# Patient Record
Sex: MALE | Race: BLACK OR AFRICAN AMERICAN | NOT HISPANIC OR LATINO | URBAN - METROPOLITAN AREA
[De-identification: names, ages, dates, MRNs, and addresses within clinical notes are randomized per-mention and may not be internally consistent; named-entity substitution may affect disease eponyms.]

---

## 2021-02-27 ENCOUNTER — INPATIENT (INPATIENT)
Facility: HOSPITAL | Age: 66
LOS: 36 days | Discharge: HOME | DRG: 871 | End: 2021-04-05
Attending: INTERNAL MEDICINE | Admitting: INTERNAL MEDICINE
Payer: OTHER GOVERNMENT

## 2021-02-27 VITALS
RESPIRATION RATE: 36 BRPM | HEART RATE: 88 BPM | OXYGEN SATURATION: 85 % | WEIGHT: 309.97 LBS | DIASTOLIC BLOOD PRESSURE: 92 MMHG | SYSTOLIC BLOOD PRESSURE: 112 MMHG | TEMPERATURE: 98 F | HEIGHT: 71 IN

## 2021-02-27 DIAGNOSIS — I48.91 UNSPECIFIED ATRIAL FIBRILLATION: ICD-10-CM

## 2021-02-27 LAB
ALBUMIN SERPL ELPH-MCNC: 3.7 G/DL — SIGNIFICANT CHANGE UP (ref 3.3–5)
ALP SERPL-CCNC: 43 U/L — SIGNIFICANT CHANGE UP (ref 40–120)
ALT FLD-CCNC: 76 U/L — HIGH (ref 10–45)
ANION GAP SERPL CALC-SCNC: 18 MMOL/L — HIGH (ref 5–17)
AST SERPL-CCNC: 394 U/L — HIGH (ref 10–40)
BASE EXCESS BLDV CALC-SCNC: 3.6 MMOL/L — HIGH (ref -2–2)
BASOPHILS # BLD AUTO: 0 K/UL — SIGNIFICANT CHANGE UP (ref 0–0.2)
BASOPHILS NFR BLD AUTO: 0 % — SIGNIFICANT CHANGE UP (ref 0–2)
BILIRUB SERPL-MCNC: 0.9 MG/DL — SIGNIFICANT CHANGE UP (ref 0.2–1.2)
BUN SERPL-MCNC: 26 MG/DL — HIGH (ref 7–23)
CA-I SERPL-SCNC: 1 MMOL/L — LOW (ref 1.12–1.3)
CALCIUM SERPL-MCNC: 8.5 MG/DL — SIGNIFICANT CHANGE UP (ref 8.4–10.5)
CHLORIDE BLDV-SCNC: 92 MMOL/L — LOW (ref 96–108)
CHLORIDE SERPL-SCNC: 90 MMOL/L — LOW (ref 96–108)
CO2 BLDV-SCNC: 29 MMOL/L — SIGNIFICANT CHANGE UP (ref 22–30)
CO2 SERPL-SCNC: 23 MMOL/L — SIGNIFICANT CHANGE UP (ref 22–31)
CREAT SERPL-MCNC: 1.67 MG/DL — HIGH (ref 0.5–1.3)
CRP SERPL-MCNC: 10.95 MG/DL — HIGH (ref 0–0.4)
D DIMER BLD IA.RAPID-MCNC: 343 NG/ML DDU — HIGH
EOSINOPHIL # BLD AUTO: 0 K/UL — SIGNIFICANT CHANGE UP (ref 0–0.5)
EOSINOPHIL NFR BLD AUTO: 0 % — SIGNIFICANT CHANGE UP (ref 0–6)
FERRITIN SERPL-MCNC: 1636 NG/ML — HIGH (ref 30–400)
GAS PNL BLDV: 127 MMOL/L — LOW (ref 135–145)
GAS PNL BLDV: SIGNIFICANT CHANGE UP
GAS PNL BLDV: SIGNIFICANT CHANGE UP
GLUCOSE BLDV-MCNC: 115 MG/DL — HIGH (ref 70–99)
GLUCOSE SERPL-MCNC: 121 MG/DL — HIGH (ref 70–99)
HCO3 BLDV-SCNC: 28 MMOL/L — SIGNIFICANT CHANGE UP (ref 21–29)
HCT VFR BLD CALC: 41.1 % — SIGNIFICANT CHANGE UP (ref 39–50)
HCT VFR BLDA CALC: 45 % — SIGNIFICANT CHANGE UP (ref 39–50)
HGB BLD CALC-MCNC: 14.8 G/DL — SIGNIFICANT CHANGE UP (ref 13–17)
HGB BLD-MCNC: 14 G/DL — SIGNIFICANT CHANGE UP (ref 13–17)
LACTATE BLDV-MCNC: 3.2 MMOL/L — HIGH (ref 0.7–2)
LYMPHOCYTES # BLD AUTO: 0.51 K/UL — LOW (ref 1–3.3)
LYMPHOCYTES # BLD AUTO: 10.4 % — LOW (ref 13–44)
MANUAL SMEAR VERIFICATION: SIGNIFICANT CHANGE UP
MCHC RBC-ENTMCNC: 29.4 PG — SIGNIFICANT CHANGE UP (ref 27–34)
MCHC RBC-ENTMCNC: 34.1 GM/DL — SIGNIFICANT CHANGE UP (ref 32–36)
MCV RBC AUTO: 86.2 FL — SIGNIFICANT CHANGE UP (ref 80–100)
MONOCYTES # BLD AUTO: 0.3 K/UL — SIGNIFICANT CHANGE UP (ref 0–0.9)
MONOCYTES NFR BLD AUTO: 6.1 % — SIGNIFICANT CHANGE UP (ref 2–14)
NEUTROPHILS # BLD AUTO: 4.12 K/UL — SIGNIFICANT CHANGE UP (ref 1.8–7.4)
NEUTROPHILS NFR BLD AUTO: 83.5 % — HIGH (ref 43–77)
OTHER CELLS CSF MANUAL: 9 ML/DL — LOW (ref 18–22)
PCO2 BLDV: 40 MMHG — SIGNIFICANT CHANGE UP (ref 35–50)
PH BLDV: 7.45 — SIGNIFICANT CHANGE UP (ref 7.35–7.45)
PLAT MORPH BLD: NORMAL — SIGNIFICANT CHANGE UP
PLATELET # BLD AUTO: 164 K/UL — SIGNIFICANT CHANGE UP (ref 150–400)
PO2 BLDV: 27 MMHG — SIGNIFICANT CHANGE UP (ref 25–45)
POTASSIUM BLDV-SCNC: 2.8 MMOL/L — CRITICAL LOW (ref 3.5–5.3)
POTASSIUM SERPL-MCNC: 2.9 MMOL/L — CRITICAL LOW (ref 3.5–5.3)
POTASSIUM SERPL-SCNC: 2.9 MMOL/L — CRITICAL LOW (ref 3.5–5.3)
PROCALCITONIN SERPL-MCNC: 0.58 NG/ML — HIGH (ref 0.02–0.1)
PROT SERPL-MCNC: 7.1 G/DL — SIGNIFICANT CHANGE UP (ref 6–8.3)
RBC # BLD: 4.77 M/UL — SIGNIFICANT CHANGE UP (ref 4.2–5.8)
RBC # FLD: 13.1 % — SIGNIFICANT CHANGE UP (ref 10.3–14.5)
RBC BLD AUTO: SIGNIFICANT CHANGE UP
SAO2 % BLDV: 44 % — LOW (ref 67–88)
SARS-COV-2 RNA SPEC QL NAA+PROBE: DETECTED
SARS-COV-2 RNA SPEC QL NAA+PROBE: DETECTED
SODIUM SERPL-SCNC: 131 MMOL/L — LOW (ref 135–145)
TROPONIN T, HIGH SENSITIVITY RESULT: 18 NG/L — SIGNIFICANT CHANGE UP (ref 0–51)
TROPONIN T, HIGH SENSITIVITY RESULT: 21 NG/L — SIGNIFICANT CHANGE UP (ref 0–51)
WBC # BLD: 4.93 K/UL — SIGNIFICANT CHANGE UP (ref 3.8–10.5)
WBC # FLD AUTO: 4.93 K/UL — SIGNIFICANT CHANGE UP (ref 3.8–10.5)

## 2021-02-27 PROCEDURE — 71045 X-RAY EXAM CHEST 1 VIEW: CPT | Mod: 26

## 2021-02-27 PROCEDURE — 71275 CT ANGIOGRAPHY CHEST: CPT | Mod: 26

## 2021-02-27 PROCEDURE — 93010 ELECTROCARDIOGRAM REPORT: CPT

## 2021-02-27 PROCEDURE — 99285 EMERGENCY DEPT VISIT HI MDM: CPT

## 2021-02-27 RX ORDER — DILTIAZEM HCL 120 MG
60 CAPSULE, EXT RELEASE 24 HR ORAL ONCE
Refills: 0 | Status: COMPLETED | OUTPATIENT
Start: 2021-02-27 | End: 2021-02-27

## 2021-02-27 RX ORDER — METOPROLOL TARTRATE 50 MG
5 TABLET ORAL ONCE
Refills: 0 | Status: DISCONTINUED | OUTPATIENT
Start: 2021-02-27 | End: 2021-02-27

## 2021-02-27 RX ORDER — DEXAMETHASONE 0.5 MG/5ML
6 ELIXIR ORAL DAILY
Refills: 0 | Status: DISCONTINUED | OUTPATIENT
Start: 2021-02-27 | End: 2021-03-08

## 2021-02-27 RX ORDER — ALBUTEROL 90 UG/1
2 AEROSOL, METERED ORAL ONCE
Refills: 0 | Status: COMPLETED | OUTPATIENT
Start: 2021-02-27 | End: 2021-02-27

## 2021-02-27 RX ORDER — REMDESIVIR 5 MG/ML
100 INJECTION INTRAVENOUS EVERY 24 HOURS
Refills: 0 | Status: COMPLETED | OUTPATIENT
Start: 2021-03-01 | End: 2021-03-04

## 2021-02-27 RX ORDER — DILTIAZEM HCL 120 MG
35 CAPSULE, EXT RELEASE 24 HR ORAL ONCE
Refills: 0 | Status: DISCONTINUED | OUTPATIENT
Start: 2021-02-27 | End: 2021-02-27

## 2021-02-27 RX ORDER — DILTIAZEM HCL 120 MG
30 CAPSULE, EXT RELEASE 24 HR ORAL ONCE
Refills: 0 | Status: COMPLETED | OUTPATIENT
Start: 2021-02-27 | End: 2021-02-27

## 2021-02-27 RX ORDER — POTASSIUM CHLORIDE 20 MEQ
40 PACKET (EA) ORAL ONCE
Refills: 0 | Status: COMPLETED | OUTPATIENT
Start: 2021-02-27 | End: 2021-02-27

## 2021-02-27 RX ORDER — ENOXAPARIN SODIUM 100 MG/ML
140 INJECTION SUBCUTANEOUS EVERY 12 HOURS
Refills: 0 | Status: DISCONTINUED | OUTPATIENT
Start: 2021-02-27 | End: 2021-03-22

## 2021-02-27 RX ORDER — POTASSIUM CHLORIDE 20 MEQ
10 PACKET (EA) ORAL
Refills: 0 | Status: COMPLETED | OUTPATIENT
Start: 2021-02-27 | End: 2021-02-27

## 2021-02-27 RX ORDER — DILTIAZEM HCL 120 MG
30 CAPSULE, EXT RELEASE 24 HR ORAL THREE TIMES A DAY
Refills: 0 | Status: DISCONTINUED | OUTPATIENT
Start: 2021-02-27 | End: 2021-03-10

## 2021-02-27 RX ORDER — REMDESIVIR 5 MG/ML
200 INJECTION INTRAVENOUS EVERY 24 HOURS
Refills: 0 | Status: COMPLETED | OUTPATIENT
Start: 2021-02-28 | End: 2021-02-28

## 2021-02-27 RX ORDER — REMDESIVIR 5 MG/ML
INJECTION INTRAVENOUS
Refills: 0 | Status: COMPLETED | OUTPATIENT
Start: 2021-02-28 | End: 2021-03-04

## 2021-02-27 RX ORDER — DEXAMETHASONE 0.5 MG/5ML
6 ELIXIR ORAL ONCE
Refills: 0 | Status: COMPLETED | OUTPATIENT
Start: 2021-02-27 | End: 2021-02-27

## 2021-02-27 RX ADMIN — Medication 100 MILLIEQUIVALENT(S): at 17:17

## 2021-02-27 RX ADMIN — Medication 40 MILLIEQUIVALENT(S): at 14:50

## 2021-02-27 RX ADMIN — Medication 30 MILLIGRAM(S): at 13:26

## 2021-02-27 RX ADMIN — ALBUTEROL 2 PUFF(S): 90 AEROSOL, METERED ORAL at 13:26

## 2021-02-27 RX ADMIN — Medication 100 MILLIEQUIVALENT(S): at 18:31

## 2021-02-27 RX ADMIN — Medication 60 MILLIGRAM(S): at 13:50

## 2021-02-27 RX ADMIN — Medication 6 MILLIGRAM(S): at 13:25

## 2021-02-27 RX ADMIN — Medication 100 MILLIEQUIVALENT(S): at 14:50

## 2021-02-27 NOTE — H&P ADULT - PROBLEM SELECTOR PLAN 1
start Remdesivir, Dexamethasone, full dose AC w sc Lovenox, unable to R/O PE. check LE dopplers, bedside TTE, cont 100%NRB. pulm and ID called, trend inflammatory markers

## 2021-02-27 NOTE — ED PROVIDER NOTE - CLINICAL SUMMARY MEDICAL DECISION MAKING FREE TEXT BOX
Yumiko Alberts, PGY-1: 64YO male hx of htn, recent dx covid 5d prior, p/w hypoxia, tachycardia, afib. not hypotensive, no CP. pt has no hx of afib and not on anticoagulation. plan for diltiazem push, covid workup, admission.

## 2021-02-27 NOTE — H&P ADULT - HISTORY OF PRESENT ILLNESS
The patient is a 65y Male, hx of morbid obesity, HTN on lisinopril, complaining of shortness of breath. covid + dx 5d prior. no home O2 requirement, former smoker. has dyspnea at rest and on exertion. has not taken steroids, no hospitalization for covid. denies CP. [facility]

## 2021-02-27 NOTE — ED PROVIDER NOTE - NS ED ROS FT
Gen: Denies fevers  CV: Denies chest pain  Skin: denies color changes  Resp: + SOB, cough  Endo: Denies increased urination  GI: Denies nausea, vomiting  Msk: Denies extremity pain  : Denies dysuria  Neuro: Denies LOC  Psych: Denies mood changes

## 2021-02-27 NOTE — ED PROVIDER NOTE - OBJECTIVE STATEMENT
The patient is a 65y Male, hx of HTN on lisinopril, complaining of shortness of breath. covid + dx 5d prior. no home O2 requirement, former smoker. has dyspnea at rest and on exertion. has not taken steroids, no hospitalization for covid. denies CP. [facility].

## 2021-02-27 NOTE — H&P ADULT - NSHPPHYSICALEXAM_GEN_ALL_CORE
T(F): 98.2 (02-27-21 @ 17:08), Max: 98.6 (02-27-21 @ 16:20)  HR: 103 (02-27-21 @ 17:08) (88 - 178)  BP: 104/71 (02-27-21 @ 17:08) (104/65 - 130/85)  RR: 25 (02-27-21 @ 17:08) (25 - 36)  SpO2: 94% (02-27-21 @ 17:08) (85% - 95%)      PHYSICAL EXAM:  GENERAL: NAD, well-developed  HEAD:  Atraumatic, Normocephalic  EYES: EOMI, PERRLA, conjunctiva and sclera clear  NECK: Supple, No JVD  CHEST/LUNG: Clear to auscultation bilaterally; No wheeze  HEART: Regular rate and rhythm; No murmurs, rubs, or gallops  ABDOMEN: Soft, Nontender, Nondistended; Bowel sounds present  EXTREMITIES:  2+ Peripheral Pulses, No clubbing, cyanosis, or edema  PSYCH: AAOx3  NEUROLOGY: non-focal  SKIN: No rashes or

## 2021-02-27 NOTE — ED ADULT NURSE NOTE - NS ED NURSE REPORT GIVEN TO FT
Report given to on coming nurse Keely humphries. Understands pmh, medications given and plan of care for patient. Patient in stable condition, vital signs updated, has no complaints at this time and has been updated on care plan. Explained to patient that it is change of shift and new nurse is taking over, pt verbalized understanding.

## 2021-02-27 NOTE — ED PROVIDER NOTE - PROGRESS NOTE DETAILS
Yumiko Alberts, PGY-1: pt presented hypoxic, dyspnea with speaking, stable BP, not hypotensive. , Afib with RVR, given diltiazem 30mg. pt responded with improved HR and more regular intervals. Attending MD Ham: Case discussed with Dr. Carmichael, will admit to her service on Tele

## 2021-02-27 NOTE — ED PROVIDER NOTE - PHYSICAL EXAMINATION
Gen: WDWN, NAD  HEENT: EOMI, no nasal discharge, mucous membranes moist  CV: tacycardic, irregular rhythm, +S1/S2, no M/R/G  Resp: crackles at b/l lung bases  GI: Abdomen soft non-distended, NTTP, no masses  MSK: No open wounds, no bruising, no LE edema  Neuro: A&Ox4, following commands, moving all four extremities spontaneously  Psych: appropriate mood

## 2021-02-27 NOTE — H&P ADULT - PROBLEM SELECTOR PLAN 4
start cardizem po. BP on the low side, trend. may need to add digoxin if remains tachy. on full AC w sc Lovenox

## 2021-02-27 NOTE — ED PROVIDER NOTE - ATTENDING CONTRIBUTION TO CARE
Attending MD Ham: I personally have seen and examined this patient.  Resident note reviewed and agree on plan of care and except where noted.  See below for details.     Seen in [facility] 34 then moved to [facility]   PMD/Cards at [facility]    65M with PMH/PSH including HTN on Lisinopril, morbid obesity, former tobacco presents to the ED with shortness of breath in setting of +COVID (diagnosed 2/22/21).  Limited history given marked dyspnea.  Reports no home O2.  Reports dyspnea at rest, worse with exertion, worsening over last week.  Reports nonproductive cough.  Denies recent steroids.  Denies chest pain.  Denies abdominal pain, nausea, vomiting, diarrhea.  Denies urinary complaints.  Denies history of AFib or anticoag.  Limited ROS secondary to dyspnea.      Exam:   General: +respiratory distress, speaking in one word answers, sitting up in bed  HENT: head NCAT, airway patent  Eyes: no conjunctival injection, anicteric  Lungs: lungs with decreased breath sounds at bilateral bases, tachypneic, sat'ing high 80's/low 90s despite NC, switched to NRB with improvement to low to low-mid 90s, no wheezing, no rhonchi, scattered rales  Cardiac: +S1S2, markedly tachycardic, irregular, no m/r/g  GI: abdomen soft with +BS, NT, exam limited secondary to body habitus  MSK: FROM at neck, no calf tenderness, swelling, erythema or warmth  Neuro: moving all extremities spontaneously, sensory grossly intact, no gross neuro deficits  Skin: warm, dry    A/P: 65M with shortness of breath in setting of known COVID and tachycardia, acute hypoxic resp failure from possible multiple factors including known COVID, will obtain COVID labs, CXR, will give decadron 6mg, ?possibly hypercoagulable from same, patient unable to lay flat for CTA at this time and not stable enough for CT scan at this time, ?new AFib w RVR on EKG, heart rate and breathing improved significantly with IV Cardizem 30mg, will give oral, keep on cardiac monitor, given patient improvement after Cardizem, will keep on NRB, if worsens, will consider high flow or bipap and an ICU consult, will need admission

## 2021-02-27 NOTE — ED ADULT NURSE NOTE - OBJECTIVE STATEMENT
66 y/o male presents to ed c/o sob. States he was diagnosed with covid this past Monday and since has had increased sob and cough. Denies chest pain, ha, n/v/d, abdominal pain, f/c, urinary symptoms, hematuria. A&Ox4, hypoxic, placed on NRB, skin warm dry and intact, MAEx4, lungs CTA, abd soft obese and nontender.  Patient's bed in the lowest position, explained plan of care to patient and family members. Will continue to reassess.

## 2021-02-28 DIAGNOSIS — I48.91 UNSPECIFIED ATRIAL FIBRILLATION: ICD-10-CM

## 2021-02-28 DIAGNOSIS — I10 ESSENTIAL (PRIMARY) HYPERTENSION: ICD-10-CM

## 2021-02-28 DIAGNOSIS — U07.1 COVID-19: ICD-10-CM

## 2021-02-28 LAB
ALBUMIN SERPL ELPH-MCNC: 3.2 G/DL — LOW (ref 3.3–5)
ALP SERPL-CCNC: 43 U/L — SIGNIFICANT CHANGE UP (ref 40–120)
ALT FLD-CCNC: 76 U/L — HIGH (ref 10–45)
ANION GAP SERPL CALC-SCNC: 14 MMOL/L — SIGNIFICANT CHANGE UP (ref 5–17)
AST SERPL-CCNC: 326 U/L — HIGH (ref 10–40)
BILIRUB DIRECT SERPL-MCNC: 0.2 MG/DL — SIGNIFICANT CHANGE UP (ref 0–0.2)
BILIRUB INDIRECT FLD-MCNC: 0.6 MG/DL — SIGNIFICANT CHANGE UP (ref 0.2–1)
BILIRUB SERPL-MCNC: 0.8 MG/DL — SIGNIFICANT CHANGE UP (ref 0.2–1.2)
BUN SERPL-MCNC: 26 MG/DL — HIGH (ref 7–23)
CALCIUM SERPL-MCNC: 8.5 MG/DL — SIGNIFICANT CHANGE UP (ref 8.4–10.5)
CHLORIDE SERPL-SCNC: 92 MMOL/L — LOW (ref 96–108)
CO2 SERPL-SCNC: 25 MMOL/L — SIGNIFICANT CHANGE UP (ref 22–31)
CREAT SERPL-MCNC: 1.27 MG/DL — SIGNIFICANT CHANGE UP (ref 0.5–1.3)
GLUCOSE SERPL-MCNC: 130 MG/DL — HIGH (ref 70–99)
HCT VFR BLD CALC: 41.9 % — SIGNIFICANT CHANGE UP (ref 39–50)
HCV AB S/CO SERPL IA: 0.17 S/CO — SIGNIFICANT CHANGE UP (ref 0–0.99)
HCV AB SERPL-IMP: SIGNIFICANT CHANGE UP
HGB BLD-MCNC: 14.1 G/DL — SIGNIFICANT CHANGE UP (ref 13–17)
INR BLD: 1.05 RATIO — SIGNIFICANT CHANGE UP (ref 0.88–1.16)
MCHC RBC-ENTMCNC: 29 PG — SIGNIFICANT CHANGE UP (ref 27–34)
MCHC RBC-ENTMCNC: 33.7 GM/DL — SIGNIFICANT CHANGE UP (ref 32–36)
MCV RBC AUTO: 86.2 FL — SIGNIFICANT CHANGE UP (ref 80–100)
NRBC # BLD: 0 /100 WBCS — SIGNIFICANT CHANGE UP (ref 0–0)
NT-PROBNP SERPL-SCNC: 277 PG/ML — SIGNIFICANT CHANGE UP (ref 0–300)
PLATELET # BLD AUTO: 176 K/UL — SIGNIFICANT CHANGE UP (ref 150–400)
POTASSIUM SERPL-MCNC: 3.8 MMOL/L — SIGNIFICANT CHANGE UP (ref 3.5–5.3)
POTASSIUM SERPL-SCNC: 3.8 MMOL/L — SIGNIFICANT CHANGE UP (ref 3.5–5.3)
PROT SERPL-MCNC: 7.3 G/DL — SIGNIFICANT CHANGE UP (ref 6–8.3)
PROTHROM AB SERPL-ACNC: 12.6 SEC — SIGNIFICANT CHANGE UP (ref 10.6–13.6)
RBC # BLD: 4.86 M/UL — SIGNIFICANT CHANGE UP (ref 4.2–5.8)
RBC # FLD: 13.2 % — SIGNIFICANT CHANGE UP (ref 10.3–14.5)
SARS-COV-2 IGG SERPL QL IA: NEGATIVE — SIGNIFICANT CHANGE UP
SARS-COV-2 IGM SERPL IA-ACNC: 0.29 INDEX — SIGNIFICANT CHANGE UP
SODIUM SERPL-SCNC: 131 MMOL/L — LOW (ref 135–145)
WBC # BLD: 6.79 K/UL — SIGNIFICANT CHANGE UP (ref 3.8–10.5)
WBC # FLD AUTO: 6.79 K/UL — SIGNIFICANT CHANGE UP (ref 3.8–10.5)

## 2021-02-28 RX ORDER — DIGOXIN 250 MCG
0.25 TABLET ORAL DAILY
Refills: 0 | Status: DISCONTINUED | OUTPATIENT
Start: 2021-03-01 | End: 2021-03-02

## 2021-02-28 RX ORDER — ALBUTEROL 90 UG/1
2.5 AEROSOL, METERED ORAL ONCE
Refills: 0 | Status: DISCONTINUED | OUTPATIENT
Start: 2021-02-28 | End: 2021-02-28

## 2021-02-28 RX ORDER — DIGOXIN 250 MCG
0.25 TABLET ORAL ONCE
Refills: 0 | Status: COMPLETED | OUTPATIENT
Start: 2021-02-28 | End: 2021-02-28

## 2021-02-28 RX ORDER — ALBUTEROL 90 UG/1
2 AEROSOL, METERED ORAL ONCE
Refills: 0 | Status: COMPLETED | OUTPATIENT
Start: 2021-02-28 | End: 2021-02-28

## 2021-02-28 RX ADMIN — ENOXAPARIN SODIUM 140 MILLIGRAM(S): 100 INJECTION SUBCUTANEOUS at 00:38

## 2021-02-28 RX ADMIN — ENOXAPARIN SODIUM 140 MILLIGRAM(S): 100 INJECTION SUBCUTANEOUS at 06:07

## 2021-02-28 RX ADMIN — ENOXAPARIN SODIUM 140 MILLIGRAM(S): 100 INJECTION SUBCUTANEOUS at 17:12

## 2021-02-28 RX ADMIN — Medication 6 MILLIGRAM(S): at 06:07

## 2021-02-28 RX ADMIN — Medication 30 MILLIGRAM(S): at 06:07

## 2021-02-28 RX ADMIN — REMDESIVIR 500 MILLIGRAM(S): 5 INJECTION INTRAVENOUS at 10:31

## 2021-02-28 RX ADMIN — Medication 0.25 MILLIGRAM(S): at 17:11

## 2021-02-28 RX ADMIN — Medication 30 MILLIGRAM(S): at 15:03

## 2021-02-28 NOTE — PROGRESS NOTE ADULT - SUBJECTIVE AND OBJECTIVE BOX
Patient is a 65y old  Male who presents with a chief complaint of acute hypoxemic respiratory failure (28 Feb 2021 13:35)      SUBJECTIVE / OVERNIGHT EVENTS: remains on NRB    MEDICATIONS  (STANDING):  ALBUTerol    90 MICROgram(s) HFA Inhaler 2 Puff(s) Inhalation once  dexAMETHasone  Injectable 6 milliGRAM(s) IV Push daily  diltiazem    Tablet 30 milliGRAM(s) Oral three times a day  enoxaparin Injectable 140 milliGRAM(s) SubCutaneous every 12 hours  remdesivir  IVPB   IV Intermittent     MEDICATIONS  (PRN):      Vital Signs Last 24 Hrs  T(F): 98.5 (02-28-21 @ 21:18), Max: 98.6 (02-28-21 @ 11:53)  HR: 101 (02-28-21 @ 21:18) (69 - 101)  BP: 127/89 (02-28-21 @ 21:18) (121/83 - 134/88)  RR: 21 (02-28-21 @ 21:18) (20 - 21)  SpO2: 88% (02-28-21 @ 21:18) (88% - 95%)  Telemetry:   CAPILLARY BLOOD GLUCOSE        I&O's Summary    27 Feb 2021 07:01  -  28 Feb 2021 07:00  --------------------------------------------------------  IN: 400 mL / OUT: 525 mL / NET: -125 mL    28 Feb 2021 07:01  -  28 Feb 2021 23:37  --------------------------------------------------------  IN: 450 mL / OUT: 500 mL / NET: -50 mL        PHYSICAL EXAM:  GENERAL: NAD, well-developed  HEAD:  Atraumatic, Normocephalic  EYES: EOMI, PERRLA, conjunctiva and sclera clear  NECK: Supple, No JVD  CHEST/LUNG: Clear to auscultation bilaterally; No wheeze  HEART: Regular rate and rhythm; No murmurs, rubs, or gallops  ABDOMEN: Soft, Nontender, Nondistended; Bowel sounds present  EXTREMITIES:  2+ Peripheral Pulses, No clubbing, cyanosis, or edema  PSYCH: AAOx3  NEUROLOGY: non-focal  SKIN: No rashes or lesions    LABS:                        14.1   6.79  )-----------( 176      ( 28 Feb 2021 06:20 )             41.9     02-28    131<L>  |  92<L>  |  26<H>  ----------------------------<  130<H>  3.8   |  25  |  1.27    Ca    8.5      28 Feb 2021 06:27    TPro  7.3  /  Alb  3.2<L>  /  TBili  0.8  /  DBili  0.2  /  AST  326<H>  /  ALT  76<H>  /  AlkPhos  43  02-28    PT/INR - ( 28 Feb 2021 06:17 )   PT: 12.6 sec;   INR: 1.05 ratio                   RADIOLOGY & ADDITIONAL TESTS:    Imaging Personally Reviewed:    Consultant(s) Notes Reviewed:      Care Discussed with Consultants/Other Providers:

## 2021-02-28 NOTE — CONSULT NOTE ADULT - SUBJECTIVE AND OBJECTIVE BOX
CHIEF COMPLAINT:    HISTORY OF PRESENT ILLNESS:   65y Male, hx of morbid obesity, HTN on lisinopril, complaining of shortness of breath. covid + dx 5d prior. no home O2 requirement, former smoker. has dyspnea at rest and on exertion. has not taken steroids, no hospitalization for covid. denies CP. [facility]  Converted to Afib RVR last night   + history of Afib. no previous stroke   no cp or palpitations   + SOB and cough       PAST MEDICAL & SURGICAL HISTORY:  HTN (hypertension)            MEDICATIONS:  diltiazem    Tablet 30 milliGRAM(s) Oral three times a day  enoxaparin Injectable 140 milliGRAM(s) SubCutaneous every 12 hours    remdesivir  IVPB   IV Intermittent           dexAMETHasone  Injectable 6 milliGRAM(s) IV Push daily        FAMILY HISTORY:      SOCIAL HISTORY:    [ ] Non-smoker  [ ] Smoker  [ ] Alcohol    Allergies    Actifed (Headache)  Sudafed (Headache)    Intolerances    	    REVIEW OF SYSTEMS:  CONSTITUTIONAL: No fever, weight loss, o+fatigue  EYES: No eye pain, visual disturbances, or discharge  ENMT:  No difficulty hearing, tinnitus, vertigo; No sinus or throat pain  NECK: No pain or stiffness  RESPIRATORY: No cough, wheezing, chills or hemoptysis; + Shortness of Breath  CARDIOVASCULAR: No chest pain, palpitations, passing out, dizziness, or leg swelling  GASTROINTESTINAL: No abdominal or epigastric pain. No nausea, vomiting, or hematemesis; No diarrhea or constipation. No melena or hematochezia.  GENITOURINARY: No dysuria, frequency, hematuria, or incontinence  NEUROLOGICAL: No headaches, memory loss, loss of strength, numbness, or tremors  SKIN: No itching, burning, rashes, or lesions   LYMPH Nodes: No enlarged glands  ENDOCRINE: No heat or cold intolerance; No hair loss  MUSCULOSKELETAL: No joint pain or swelling; No muscle, back, or extremity pain  PSYCHIATRIC: No depression, anxiety, mood swings, or difficulty sleeping  HEME/LYMPH: No easy bruising, or bleeding gums  ALLERY AND IMMUNOLOGIC: No hives or eczema	    [ ] All others negative	  [ ] Unable to obtain    PHYSICAL EXAM:  T(C): 36.5 (02-28-21 @ 04:44), Max: 37 (02-27-21 @ 16:20)  HR: 84 (02-28-21 @ 04:44) (84 - 178)  BP: 123/89 (02-28-21 @ 04:44) (104/65 - 130/85)  RR: 20 (02-28-21 @ 04:44) (20 - 36)  SpO2: 93% (02-28-21 @ 04:44) (85% - 95%)  Wt(kg): --  I&O's Summary    27 Feb 2021 07:01  -  28 Feb 2021 07:00  --------------------------------------------------------  IN: 400 mL / OUT: 525 mL / NET: -125 mL        Appearance: NAD obese on NRB    HEENT:   Normal oral mucosa, PERRL, EOMI	  Lymphatic: No lymphadenopathy  Cardiovascular: Irregular  S1 S2, No JVD, No murmurs, No edema  Respiratory: Decreased bs  Psychiatry: A & O x 3, Mood & affect appropriate  Gastrointestinal:  Soft, Non-tender, + BS	  Skin: No rashes, No ecchymoses, No cyanosis	  Neurologic: Non-focal  Extremities: Normal range of motion, No clubbing, cyanosis or edema  Vascular: Peripheral pulses palpable 2+ bilaterally    TELEMETRY: SR, Afib RVR 	    ECG:  	Afib RVR. nonspecific stt changes   RADIOLOGY:  < from: Xray Chest 1 View- PORTABLE-Urgent (Xray Chest 1 View- PORTABLE-Urgent .) (02.27.21 @ 15:07) >  EXAM:  XR CHEST PORTABLE URGENT 1V                            PROCEDURE DATE:  02/27/2021            INTERPRETATION:  CLINICAL INDICATION: Shortness of Breath    TECHNIQUE: Single frontal, portable view of the chest was obtained.    COMPARISON: Chest Radiograph dated 2/22/2012    FINDINGS:    Evaluation of the lung parenchyma is limited secondary to suboptimal inspiration patient body habitus. Scattered diffuse airspace opacities throughout the bilateral lungs, most prominent in the bilateral lower lobes is present. Small left pleural effusion.    IMPRESSION:  Evaluation of the lung parenchyma is limited secondary to suboptimal inspiration patient body habitus. Scattered diffuse airspace opacities throughout the bilateral lungs, most prominent in the bilateral lower lobes is present. Small left pleural effusion.      < end of copied text >  < from: CT Angio Chest w/ IV Cont (02.27.21 @ 23:35) >    EXAM:  CT ANGIO CHEST (W)AW IC                            PROCEDURE DATE:  02/27/2021            INTERPRETATION:  CLINICAL INFORMATION: Hypoxia, Covid 19 infection, assess for pulmonary embolism    COMPARISON: Chest radiograph from same day    PROCEDURE:  CT Angiography of the Chest.  90 ml of Omnipaque 350 was injected intravenously. 10 ml were discarded.  Sagittal and coronal reformats were performed as well as 3D (MIP) reconstructions.    FINDINGS:    LUNGS AND AIRWAYS: Patent central airways.  Bilateral patchy groundglass and consolidative opacities with peripheral and lower lobe predominance.  PLEURA: No pleural effusion.  MEDIASTINUM AND KASIE: No lymphadenopathy.  VESSELS: No central, main, lobar, or segmental pulmonary embolism. Evaluation of the subsegmental pulmonary arteries is limited/nondiagnostic secondary to respiratory motion.  HEART: Heart size is normal. No pericardial effusion.  CHEST WALL AND LOWER NECK: Prominent right upper paratracheal lymph node may be reactive  VISUALIZED UPPER ABDOMEN: Small hiatal hernia.  BONES: Spinal degenerative changes.    IMPRESSION:  No central, main, lobar, or segmental pulmonary embolism. Evaluation of the subsegmental pulmonary arteries is limited/nondiagnostic secondary to respiratory motion.    Bilateral patchy groundglass and consolidative opacities with peripheral and lower lobe predominance, compatible with patient's known Covid 19 infection. Follow-up to resolution recommended.                VLAD TRACEY MD; Resident Interventional Radiology  This document has been electronically signed.  MARTI CANTU MD; Attending Radiologist  This document has been electronically signed. Feb 28 2021 10:25AM    < end of copied text >    OTHER: 	  	  LABS:	 	    CARDIAC MARKERS:        COVID-19 PCR: Detected: You can help in the fight against COVID-19. [facility] may contact                           14.1   6.79  )-----------( 176      ( 28 Feb 2021 06:20 )             41.9     02-28    131<L>  |  92<L>  |  26<H>  ----------------------------<  130<H>  3.8   |  25  |  1.27    Ca    8.5      28 Feb 2021 06:27    TPro  7.3  /  Alb  3.2<L>  /  TBili  0.8  /  DBili  0.2  /  AST  326<H>  /  ALT  76<H>  /  AlkPhos  43  02-28    proBNP: Serum Pro-Brain Natriuretic Peptide: 277 pg/mL (02-28 @ 06:27)    Lipid Profile:   HgA1c:   TSH:

## 2021-02-28 NOTE — CONSULT NOTE ADULT - SUBJECTIVE AND OBJECTIVE BOX
PULMONARY CONSULT    Initial HPI on admission:  HPI:  The patient is a 65y Male, hx of morbid obesity, HTN on lisinopril, complaining of shortness of breath. covid + dx 5d prior. no home O2 requirement, former smoker. has dyspnea at rest and on exertion. has not taken steroids, no hospitalization for covid. denies CP. [facility]. (27 Feb 2021 22:29)      BRIEF HOSPITAL COURSE: Tolerating NRB at present.     PAST MEDICAL & SURGICAL HISTORY:  HTN (hypertension)      Allergies    Actifed (Headache)  Sudafed (Headache)    Intolerances      FAMILY HISTORY: no known lung cancer    Social history: nonsmoker    Review of Systems:  CONSTITUTIONAL: No fever, chills, or fatigue  EYES: No eye pain, visual disturbances, or discharge  ENMT:  No difficulty hearing, tinnitus, vertigo; No sinus or throat pain  NECK: No pain or stiffness  RESPIRATORY: Per above  CARDIOVASCULAR: No chest pain, palpitations, dizziness, or leg swelling  GASTROINTESTINAL: No abdominal or epigastric pain. No nausea, vomiting, or hematemesis; No diarrhea or constipation. No melena or hematochezia.  GENITOURINARY: No dysuria, frequency, hematuria, or incontinence  NEUROLOGICAL: No headaches, memory loss, loss of strength, numbness, or tremors  SKIN: No itching, burning, rashes, or lesions   MUSCULOSKELETAL: No joint pain or swelling; No muscle, back, or extremity pain  PSYCHIATRIC: No depression, anxiety, mood swings, or difficulty sleeping    Medications:  MEDICATIONS  (STANDING):  dexAMETHasone  Injectable 6 milliGRAM(s) IV Push daily  diltiazem    Tablet 30 milliGRAM(s) Oral three times a day  enoxaparin Injectable 140 milliGRAM(s) SubCutaneous every 12 hours  remdesivir  IVPB   IV Intermittent     MEDICATIONS  (PRN):    Vital Signs Last 24 Hrs  T(C): 37 (28 Feb 2021 11:53), Max: 37 (27 Feb 2021 16:20)  T(F): 98.6 (28 Feb 2021 11:53), Max: 98.6 (27 Feb 2021 16:20)  HR: 89 (28 Feb 2021 12:06) (79 - 147)  BP: 121/83 (28 Feb 2021 12:06) (104/71 - 125/91)  BP(mean): --  RR: 20 (28 Feb 2021 11:53) (20 - 30)  SpO2: 94% (28 Feb 2021 11:53) (93% - 95%)    VBG pH 7.45 02-27 @ 14:59  VBG pCO2 40 02-27 @ 14:59  VBG O2 sat 44 02-27 @ 14:59  VBG lactate 3.2 02-27 @ 14:59    02-27 @ 07:01  -  02-28 @ 07:00  --------------------------------------------------------  IN: 400 mL / OUT: 525 mL / NET: -125 mL    LABS:                        14.1   6.79  )-----------( 176      ( 28 Feb 2021 06:20 )             41.9     02-28    131<L>  |  92<L>  |  26<H>  ----------------------------<  130<H>  3.8   |  25  |  1.27    Ca    8.5      28 Feb 2021 06:27    TPro  7.3  /  Alb  3.2<L>  /  TBili  0.8  /  DBili  0.2  /  AST  326<H>  /  ALT  76<H>  /  AlkPhos  43  02-28    CAPILLARY BLOOD GLUCOSE        PT/INR - ( 28 Feb 2021 06:17 )   PT: 12.6 sec;   INR: 1.05 ratio      Procalcitonin, Serum: 0.58 ng/mL (02-27-21 @ 13:25)    Serum Pro-Brain Natriuretic Peptide: 277 pg/mL (02-28-21 @ 06:27)    CULTURES: (if applicable)    Physical Examination:    General: No acute distress.      HEENT: Pupils equal, reactive to light.  Symmetric.    PULM: Decreased BS at bases    CVS: S1, S2    ABD: Soft, nondistended, nontender, normoactive bowel sounds, no masses    EXT: No edema, nontender    SKIN: Warm and well perfused, no rashes noted.    NEURO: Alert, oriented, interactive, nonfocal    RADIOLOGY REVIEWED  CXR:    CT chest:    TTE:   done PULMONARY CONSULT    Initial HPI on admission:  HPI:  The patient is a 65y Male, hx of morbid obesity, HTN on lisinopril, complaining of shortness of breath. covid + dx 5d prior. no home O2 requirement, former smoker. has dyspnea at rest and on exertion. has not taken steroids, no hospitalization for covid. denies CP. [facility]. (27 Feb 2021 22:29)      BRIEF HOSPITAL COURSE: Tolerating NRB at present.     PAST MEDICAL & SURGICAL HISTORY:  HTN (hypertension)      Allergies    Actifed (Headache)  Sudafed (Headache)    Intolerances      FAMILY HISTORY: no known lung cancer    Social history: nonsmoker    Review of Systems:  CONSTITUTIONAL: No fever, chills, or fatigue  EYES: No eye pain, visual disturbances, or discharge  ENMT:  No difficulty hearing, tinnitus, vertigo; No sinus or throat pain  NECK: No pain or stiffness  RESPIRATORY: Per above  CARDIOVASCULAR: No chest pain, palpitations, dizziness, or leg swelling  GASTROINTESTINAL: No abdominal or epigastric pain. No nausea, vomiting, or hematemesis; No diarrhea or constipation. No melena or hematochezia.  GENITOURINARY: No dysuria, frequency, hematuria, or incontinence  NEUROLOGICAL: No headaches, memory loss, loss of strength, numbness, or tremors  SKIN: No itching, burning, rashes, or lesions   MUSCULOSKELETAL: No joint pain or swelling; No muscle, back, or extremity pain  PSYCHIATRIC: No depression, anxiety, mood swings, or difficulty sleeping    Medications:  MEDICATIONS  (STANDING):  dexAMETHasone  Injectable 6 milliGRAM(s) IV Push daily  diltiazem    Tablet 30 milliGRAM(s) Oral three times a day  enoxaparin Injectable 140 milliGRAM(s) SubCutaneous every 12 hours  remdesivir  IVPB   IV Intermittent     MEDICATIONS  (PRN):    Vital Signs Last 24 Hrs  T(C): 37 (28 Feb 2021 11:53), Max: 37 (27 Feb 2021 16:20)  T(F): 98.6 (28 Feb 2021 11:53), Max: 98.6 (27 Feb 2021 16:20)  HR: 89 (28 Feb 2021 12:06) (79 - 147)  BP: 121/83 (28 Feb 2021 12:06) (104/71 - 125/91)  BP(mean): --  RR: 20 (28 Feb 2021 11:53) (20 - 30)  SpO2: 94% (28 Feb 2021 11:53) (93% - 95%)    VBG pH 7.45 02-27 @ 14:59  VBG pCO2 40 02-27 @ 14:59  VBG O2 sat 44 02-27 @ 14:59  VBG lactate 3.2 02-27 @ 14:59    02-27 @ 07:01  -  02-28 @ 07:00  --------------------------------------------------------  IN: 400 mL / OUT: 525 mL / NET: -125 mL    LABS:                        14.1   6.79  )-----------( 176      ( 28 Feb 2021 06:20 )             41.9     02-28    131<L>  |  92<L>  |  26<H>  ----------------------------<  130<H>  3.8   |  25  |  1.27    Ca    8.5      28 Feb 2021 06:27    TPro  7.3  /  Alb  3.2<L>  /  TBili  0.8  /  DBili  0.2  /  AST  326<H>  /  ALT  76<H>  /  AlkPhos  43  02-28    CAPILLARY BLOOD GLUCOSE        PT/INR - ( 28 Feb 2021 06:17 )   PT: 12.6 sec;   INR: 1.05 ratio      Procalcitonin, Serum: 0.58 ng/mL (02-27-21 @ 13:25)    Serum Pro-Brain Natriuretic Peptide: 277 pg/mL (02-28-21 @ 06:27)    CULTURES: (if applicable)    Physical Examination:    General: No acute distress.      HEENT: Pupils equal, reactive to light.  Symmetric.    PULM: Decreased BS at bases    CVS: S1, S2    ABD: Soft, nondistended, nontender, normoactive bowel sounds, no masses    EXT: No edema, nontender    SKIN: Warm and well perfused, no rashes noted.    NEURO: Alert, oriented, interactive, nonfocal    RADIOLOGY REVIEWED  CXR:    CT chest: patchy GGO b/l, no large PE, smaller vessels nondiagnostic    TTE:

## 2021-02-28 NOTE — CONSULT NOTE ADULT - PROBLEM SELECTOR RECOMMENDATION 9
remdesivir  steroids  agree with empiric therapeutic lovenox remdesivir  steroids  on therapeutic lovenox for afib  no clear r/o of PE on smaller vessels  would keep therapeutic lovenox for now

## 2021-02-28 NOTE — CONSULT NOTE ADULT - SUBJECTIVE AND OBJECTIVE BOX
Patient is a 65y old  Male who presents with a chief complaint of acute hypoxemic respiratory failure (28 Feb 2021 11:04)      HPI:  The patient is a 65y Male, hx of morbid obesity, HTN on lisinopril, complaining of shortness of breath. covid + dx 5d prior. no home O2 requirement, former smoker. has dyspnea at rest and on exertion. has not taken steroids, no hospitalization for covid. denies CP. [facility]. (27 Feb 2021 22:29)      REVIEW OF SYSTEMS:    CONSTITUTIONAL: No fever, weight loss, or fatigue  EYES: No eye pain, visual disturbances, or discharge  ENMT:  No sore throat  NECK: No pain or stiffness  RESPIRATORY: No cough, wheezing, chills or hemoptysis; No shortness of breath  CARDIOVASCULAR: No chest pain, palpitations, dizziness, or leg swelling  GASTROINTESTINAL: No abdominal or epigastric pain. No nausea, vomiting, or hematemesis; No diarrhea or constipation. No melena or hematochezia.  GENITOURINARY: No dysuria, frequency, hematuria, or incontinence  NEUROLOGICAL: No headaches, memory loss, loss of strength, numbness, or tremors  SKIN: No itching, burning, rashes, or lesions   LYMPH NODES: No enlarged glands  MUSCULOSKELETAL: No joint pain or swelling; No muscle, back, or extremity pain      PAST MEDICAL & SURGICAL HISTORY:  HTN (hypertension)        Allergies    Actifed (Headache)  Sudafed (Headache)    Intolerances        FAMILY HISTORY:      SOCIAL HISTORY:        MEDICATIONS  (STANDING):  dexAMETHasone  Injectable 6 milliGRAM(s) IV Push daily  diltiazem    Tablet 30 milliGRAM(s) Oral three times a day  enoxaparin Injectable 140 milliGRAM(s) SubCutaneous every 12 hours  remdesivir  IVPB   IV Intermittent     MEDICATIONS  (PRN):      Vital Signs Last 24 Hrs  T(C): 36.5 (28 Feb 2021 04:44), Max: 37 (27 Feb 2021 16:20)  T(F): 97.7 (28 Feb 2021 04:44), Max: 98.6 (27 Feb 2021 16:20)  HR: 84 (28 Feb 2021 04:44) (84 - 178)  BP: 123/89 (28 Feb 2021 04:44) (104/65 - 130/85)  BP(mean): --  RR: 20 (28 Feb 2021 04:44) (20 - 36)  SpO2: 93% (28 Feb 2021 04:44) (85% - 95%)    PHYSICAL EXAM:    GENERAL: NAD, well-groomed  HEAD:  Atraumatic, Normocephalic  EYES: EOMI, PERRLA, conjunctiva and sclera clear  ENMT: No tonsillar erythema, exudates, or enlargement; Moist mucous membranes  NECK: Supple, No JVD  CHEST/LUNG: Clear to percussion bilaterally; No rales, rhonchi, wheezing, or rubs  HEART: Regular rate and rhythm; No murmurs, rubs, or gallops  ABDOMEN: Soft, Nontender, Nondistended; Bowel sounds present  EXTREMITIES:  2+ Peripheral Pulses, No clubbing, cyanosis, or edema  LYMPH: No lymphadenopathy noted  SKIN: No rashes or lesions    LABS:  CBC Full  -  ( 28 Feb 2021 06:20 )  WBC Count : 6.79 K/uL  RBC Count : 4.86 M/uL  Hemoglobin : 14.1 g/dL  Hematocrit : 41.9 %  Platelet Count - Automated : 176 K/uL  Mean Cell Volume : 86.2 fl  Mean Cell Hemoglobin : 29.0 pg  Mean Cell Hemoglobin Concentration : 33.7 gm/dL  Auto Neutrophil # : x  Auto Lymphocyte # : x  Auto Monocyte # : x  Auto Eosinophil # : x  Auto Basophil # : x  Auto Neutrophil % : x  Auto Lymphocyte % : x  Auto Monocyte % : x  Auto Eosinophil % : x  Auto Basophil % : x      02-28    131<L>  |  92<L>  |  26<H>  ----------------------------<  130<H>  3.8   |  25  |  1.27    Ca    8.5      28 Feb 2021 06:27    TPro  7.3  /  Alb  3.2<L>  /  TBili  0.8  /  DBili  0.2  /  AST  326<H>  /  ALT  76<H>  /  AlkPhos  43  02-28      LIVER FUNCTIONS - ( 28 Feb 2021 06:27 )  Alb: 3.2 g/dL / Pro: 7.3 g/dL / ALK PHOS: 43 U/L / ALT: 76 U/L / AST: 326 U/L / GGT: x                               MICROBIOLOGY:                    RADIOLOGY:                 Patient is a 65y old  Male who presents with a chief complaint of acute hypoxemic respiratory failure (28 Feb 2021 11:04)      HPI:  The patient is a 65y Male, hx of morbid obesity, HTN on lisinopril, complaining of shortness of breath. covid + dx 5d prior. no home O2 requirement, former smoker. has dyspnea at rest and on exertion. has not taken steroids, no hospitalization for covid. denies CP. [facility]. (27 Feb 2021 22:29)    ER vital:  T 98.4, P 178, /85.  Pt satting 85% on RA, placed on nasal cannula, advanced to NRB.  Covid pcr (+), covid serologies (-).   CT angio chest (-) for PE, (+) b/l patchy opacities.  Pt with elevated AST (>5x ULN but <10x) and ALT.   Pt started on remdesivir and dexamethasone.     REVIEW OF SYSTEMS:    CONSTITUTIONAL: No fever, weight loss, or fatigue  EYES: No eye pain, visual disturbances, or discharge  ENMT:  No sore throat  NECK: No pain or stiffness  RESPIRATORY: No cough, wheezing, chills or hemoptysis; No shortness of breath  CARDIOVASCULAR: No chest pain, palpitations, dizziness, or leg swelling  GASTROINTESTINAL: No abdominal or epigastric pain. No nausea, vomiting, or hematemesis; No diarrhea or constipation. No melena or hematochezia.  GENITOURINARY: No dysuria, frequency, hematuria, or incontinence  NEUROLOGICAL: No headaches, memory loss, loss of strength, numbness, or tremors  SKIN: No itching, burning, rashes, or lesions   LYMPH NODES: No enlarged glands  MUSCULOSKELETAL: No joint pain or swelling; No muscle, back, or extremity pain      PAST MEDICAL & SURGICAL HISTORY:  HTN (hypertension)        Allergies    Actifed (Headache)  Sudafed (Headache)    Intolerances        FAMILY HISTORY:    No pertinent fam hx in 1st degree relatives    SOCIAL HISTORY:    Former smoker    MEDICATIONS  (STANDING):  dexAMETHasone  Injectable 6 milliGRAM(s) IV Push daily  diltiazem    Tablet 30 milliGRAM(s) Oral three times a day  enoxaparin Injectable 140 milliGRAM(s) SubCutaneous every 12 hours  remdesivir  IVPB   IV Intermittent     MEDICATIONS  (PRN):      Vital Signs Last 24 Hrs  T(C): 36.5 (28 Feb 2021 04:44), Max: 37 (27 Feb 2021 16:20)  T(F): 97.7 (28 Feb 2021 04:44), Max: 98.6 (27 Feb 2021 16:20)  HR: 84 (28 Feb 2021 04:44) (84 - 178)  BP: 123/89 (28 Feb 2021 04:44) (104/65 - 130/85)  BP(mean): --  RR: 20 (28 Feb 2021 04:44) (20 - 36)  SpO2: 93% (28 Feb 2021 04:44) (85% - 95%)    PHYSICAL EXAM:    GENERAL: NAD, well-groomed  HEAD:  Atraumatic, Normocephalic  EYES: EOMI, PERRLA, conjunctiva and sclera clear  ENMT: No tonsillar erythema, exudates, or enlargement; Moist mucous membranes  NECK: Supple, No JVD  CHEST/LUNG: Clear to percussion bilaterally; No rales, rhonchi, wheezing, or rubs  HEART: Regular rate and rhythm; No murmurs, rubs, or gallops  ABDOMEN: Soft, Nontender, Nondistended; Bowel sounds present  EXTREMITIES:  2+ Peripheral Pulses, No clubbing, cyanosis, or edema  LYMPH: No lymphadenopathy noted  SKIN: No rashes or lesions    LABS:  CBC Full  -  ( 28 Feb 2021 06:20 )  WBC Count : 6.79 K/uL  RBC Count : 4.86 M/uL  Hemoglobin : 14.1 g/dL  Hematocrit : 41.9 %  Platelet Count - Automated : 176 K/uL  Mean Cell Volume : 86.2 fl  Mean Cell Hemoglobin : 29.0 pg  Mean Cell Hemoglobin Concentration : 33.7 gm/dL  Auto Neutrophil # : x  Auto Lymphocyte # : x  Auto Monocyte # : x  Auto Eosinophil # : x  Auto Basophil # : x  Auto Neutrophil % : x  Auto Lymphocyte % : x  Auto Monocyte % : x  Auto Eosinophil % : x  Auto Basophil % : x      02-28    131<L>  |  92<L>  |  26<H>  ----------------------------<  130<H>  3.8   |  25  |  1.27    Ca    8.5      28 Feb 2021 06:27    TPro  7.3  /  Alb  3.2<L>  /  TBili  0.8  /  DBili  0.2  /  AST  326<H>  /  ALT  76<H>  /  AlkPhos  43  02-28      LIVER FUNCTIONS - ( 28 Feb 2021 06:27 )  Alb: 3.2 g/dL / Pro: 7.3 g/dL / ALK PHOS: 43 U/L / ALT: 76 U/L / AST: 326 U/L / GGT: x                               MICROBIOLOGY:                    RADIOLOGY:

## 2021-03-01 LAB
A1C WITH ESTIMATED AVERAGE GLUCOSE RESULT: 6.2 % — HIGH (ref 4–5.6)
ALBUMIN SERPL ELPH-MCNC: 3.1 G/DL — LOW (ref 3.3–5)
ALP SERPL-CCNC: 49 U/L — SIGNIFICANT CHANGE UP (ref 40–120)
ALT FLD-CCNC: 76 U/L — HIGH (ref 10–45)
ANION GAP SERPL CALC-SCNC: 17 MMOL/L — SIGNIFICANT CHANGE UP (ref 5–17)
AST SERPL-CCNC: 224 U/L — HIGH (ref 10–40)
BASE EXCESS BLDA CALC-SCNC: 2.4 MMOL/L — HIGH (ref -2–2)
BASE EXCESS BLDA CALC-SCNC: 3.5 MMOL/L — HIGH (ref -2–2)
BILIRUB DIRECT SERPL-MCNC: 0.2 MG/DL — SIGNIFICANT CHANGE UP (ref 0–0.2)
BILIRUB INDIRECT FLD-MCNC: 0.5 MG/DL — SIGNIFICANT CHANGE UP (ref 0.2–1)
BILIRUB SERPL-MCNC: 0.7 MG/DL — SIGNIFICANT CHANGE UP (ref 0.2–1.2)
BUN SERPL-MCNC: 31 MG/DL — HIGH (ref 7–23)
CALCIUM SERPL-MCNC: 8.5 MG/DL — SIGNIFICANT CHANGE UP (ref 8.4–10.5)
CHLORIDE SERPL-SCNC: 93 MMOL/L — LOW (ref 96–108)
CO2 BLDA-SCNC: 26 MMOL/L — SIGNIFICANT CHANGE UP (ref 22–30)
CO2 BLDA-SCNC: 26 MMOL/L — SIGNIFICANT CHANGE UP (ref 22–30)
CO2 SERPL-SCNC: 23 MMOL/L — SIGNIFICANT CHANGE UP (ref 22–31)
CREAT SERPL-MCNC: 1.31 MG/DL — HIGH (ref 0.5–1.3)
CRP SERPL-MCNC: 10.35 MG/DL — HIGH (ref 0–0.4)
D DIMER BLD IA.RAPID-MCNC: 282 NG/ML DDU — HIGH
ESTIMATED AVERAGE GLUCOSE: 131 MG/DL — HIGH (ref 68–114)
FERRITIN SERPL-MCNC: 1468 NG/ML — HIGH (ref 30–400)
GAS PNL BLDA: SIGNIFICANT CHANGE UP
GAS PNL BLDA: SIGNIFICANT CHANGE UP
GLUCOSE SERPL-MCNC: 123 MG/DL — HIGH (ref 70–99)
HCO3 BLDA-SCNC: 25 MMOL/L — SIGNIFICANT CHANGE UP (ref 21–29)
HCO3 BLDA-SCNC: 26 MMOL/L — SIGNIFICANT CHANGE UP (ref 21–29)
HCT VFR BLD CALC: 42.8 % — SIGNIFICANT CHANGE UP (ref 39–50)
HGB BLD-MCNC: 14.4 G/DL — SIGNIFICANT CHANGE UP (ref 13–17)
HOROWITZ INDEX BLDA+IHG-RTO: 100 — SIGNIFICANT CHANGE UP
INR BLD: 1.12 RATIO — SIGNIFICANT CHANGE UP (ref 0.88–1.16)
MCHC RBC-ENTMCNC: 29.2 PG — SIGNIFICANT CHANGE UP (ref 27–34)
MCHC RBC-ENTMCNC: 33.6 GM/DL — SIGNIFICANT CHANGE UP (ref 32–36)
MCV RBC AUTO: 86.8 FL — SIGNIFICANT CHANGE UP (ref 80–100)
NRBC # BLD: 0 /100 WBCS — SIGNIFICANT CHANGE UP (ref 0–0)
PCO2 BLDA: 32 MMHG — SIGNIFICANT CHANGE UP (ref 32–46)
PCO2 BLDA: 32 MMHG — SIGNIFICANT CHANGE UP (ref 32–46)
PH BLDA: 7.5 — HIGH (ref 7.35–7.45)
PH BLDA: 7.52 — HIGH (ref 7.35–7.45)
PLATELET # BLD AUTO: 225 K/UL — SIGNIFICANT CHANGE UP (ref 150–400)
PO2 BLDA: 60 MMHG — LOW (ref 74–108)
PO2 BLDA: 69 MMHG — LOW (ref 74–108)
POTASSIUM SERPL-MCNC: 3.6 MMOL/L — SIGNIFICANT CHANGE UP (ref 3.5–5.3)
POTASSIUM SERPL-SCNC: 3.6 MMOL/L — SIGNIFICANT CHANGE UP (ref 3.5–5.3)
PROT SERPL-MCNC: 7.4 G/DL — SIGNIFICANT CHANGE UP (ref 6–8.3)
PROTHROM AB SERPL-ACNC: 13.2 SEC — SIGNIFICANT CHANGE UP (ref 10.6–13.6)
RBC # BLD: 4.93 M/UL — SIGNIFICANT CHANGE UP (ref 4.2–5.8)
RBC # FLD: 13.4 % — SIGNIFICANT CHANGE UP (ref 10.3–14.5)
SAO2 % BLDA: 92 % — SIGNIFICANT CHANGE UP (ref 92–96)
SAO2 % BLDA: 94 % — SIGNIFICANT CHANGE UP (ref 92–96)
SODIUM SERPL-SCNC: 133 MMOL/L — LOW (ref 135–145)
WBC # BLD: 11.96 K/UL — HIGH (ref 3.8–10.5)
WBC # FLD AUTO: 11.96 K/UL — HIGH (ref 3.8–10.5)

## 2021-03-01 PROCEDURE — 71045 X-RAY EXAM CHEST 1 VIEW: CPT | Mod: 26

## 2021-03-01 RX ORDER — DILTIAZEM HCL 120 MG
5 CAPSULE, EXT RELEASE 24 HR ORAL ONCE
Refills: 0 | Status: COMPLETED | OUTPATIENT
Start: 2021-03-01 | End: 2021-03-01

## 2021-03-01 RX ADMIN — ENOXAPARIN SODIUM 140 MILLIGRAM(S): 100 INJECTION SUBCUTANEOUS at 06:26

## 2021-03-01 RX ADMIN — Medication 0.25 MILLIGRAM(S): at 06:26

## 2021-03-01 RX ADMIN — Medication 30 MILLIGRAM(S): at 23:20

## 2021-03-01 RX ADMIN — Medication 30 MILLIGRAM(S): at 00:09

## 2021-03-01 RX ADMIN — ALBUTEROL 2 PUFF(S): 90 AEROSOL, METERED ORAL at 00:09

## 2021-03-01 RX ADMIN — Medication 30 MILLIGRAM(S): at 06:26

## 2021-03-01 RX ADMIN — ENOXAPARIN SODIUM 140 MILLIGRAM(S): 100 INJECTION SUBCUTANEOUS at 17:08

## 2021-03-01 RX ADMIN — Medication 30 MILLIGRAM(S): at 13:05

## 2021-03-01 RX ADMIN — Medication 6 MILLIGRAM(S): at 06:25

## 2021-03-01 RX ADMIN — REMDESIVIR 500 MILLIGRAM(S): 5 INJECTION INTRAVENOUS at 09:57

## 2021-03-01 NOTE — PROGRESS NOTE ADULT - ASSESSMENT
65y Male, hx of morbid obesity, HTN on lisinopril, complaining of shortness of breath. covid + dx 5d prior. no home O2 requirement, former smoker. has dyspnea at rest and on exertion. has not taken steroids, no hospitalization for covid. denies CP. [facility].  Converted to Afib RVR last night   + history of Afib. no previous stroke   no cp or palpitations   + SOB and cough

## 2021-03-01 NOTE — PROGRESS NOTE ADULT - SUBJECTIVE AND OBJECTIVE BOX
Patient is a 65y old  Male who presents with a chief complaint of acute hypoxemic respiratory failure (01 Mar 2021 19:39)      SUBJECTIVE / OVERNIGHT EVENTS: on HFNC 60L/100%, PO2 60 on ABG    MEDICATIONS  (STANDING):  dexAMETHasone  Injectable 6 milliGRAM(s) IV Push daily  digoxin     Tablet 0.25 milliGRAM(s) Oral daily  diltiazem    Tablet 30 milliGRAM(s) Oral three times a day  enoxaparin Injectable 140 milliGRAM(s) SubCutaneous every 12 hours  remdesivir  IVPB   IV Intermittent   remdesivir  IVPB 100 milliGRAM(s) IV Intermittent every 24 hours    MEDICATIONS  (PRN):      Vital Signs Last 24 Hrs  T(F): 98.5 (03-01-21 @ 12:25), Max: 99.2 (03-01-21 @ 05:35)  HR: 93 (03-01-21 @ 18:54) (84 - 101)  BP: 125/81 (03-01-21 @ 12:25) (107/68 - 127/89)  RR: 18 (03-01-21 @ 14:39) (18 - 21)  SpO2: 95% (03-01-21 @ 18:54) (88% - 96%)  Telemetry:   CAPILLARY BLOOD GLUCOSE        I&O's Summary    28 Feb 2021 07:01  -  01 Mar 2021 07:00  --------------------------------------------------------  IN: 850 mL / OUT: 1100 mL / NET: -250 mL    01 Mar 2021 07:01  -  01 Mar 2021 20:35  --------------------------------------------------------  IN: 250 mL / OUT: 475 mL / NET: -225 mL        PHYSICAL EXAM:  GENERAL: NAD, well-developed  HEAD:  Atraumatic, Normocephalic  EYES: EOMI, PERRLA, conjunctiva and sclera clear  NECK: Supple, No JVD  CHEST/LUNG: Clear to auscultation bilaterally; No wheeze  HEART: Regular rate and rhythm; No murmurs, rubs, or gallops  ABDOMEN: Soft, Nontender, Nondistended; Bowel sounds present  EXTREMITIES:  2+ Peripheral Pulses, No clubbing, cyanosis, or edema  PSYCH: AAOx3  NEUROLOGY: non-focal  SKIN: No rashes or lesions    LABS:                        14.4   11.96 )-----------( 225      ( 01 Mar 2021 06:33 )             42.8     03-01    133<L>  |  93<L>  |  31<H>  ----------------------------<  123<H>  3.6   |  23  |  1.31<H>    Ca    8.5      01 Mar 2021 06:33    TPro  7.4  /  Alb  3.1<L>  /  TBili  0.7  /  DBili  0.2  /  AST  224<H>  /  ALT  76<H>  /  AlkPhos  49  03-01    PT/INR - ( 01 Mar 2021 07:16 )   PT: 13.2 sec;   INR: 1.12 ratio                   RADIOLOGY & ADDITIONAL TESTS:    Imaging Personally Reviewed:    Consultant(s) Notes Reviewed:      Care Discussed with Consultants/Other Providers:

## 2021-03-01 NOTE — PHYSICAL THERAPY INITIAL EVALUATION ADULT - ADDITIONAL COMMENTS
Pt lives in a  with his girlfriend +5 steps to enter with HR. Pt was ambulating (I) without an AD and was (I) with all ADLS PTA. Pt was working full time as a private contractor. Pt lives in an apartment with his girlfriend +5 steps to enter and 2 FOS with HR to third floor. Pt reports his apartment does not have an elevator. Pt was ambulating (I) without an AD and was (I) with all ADLS PTA. Pt was working full time as a private contractor.

## 2021-03-01 NOTE — PROGRESS NOTE ADULT - SUBJECTIVE AND OBJECTIVE BOX
Subjective: Patient seen and examined. No new events except as noted.   seen this am  no cp or sob      REVIEW OF SYSTEMS:    CONSTITUTIONAL: + weakness, fevers or chills  EYES/ENT: No visual changes;  No vertigo or throat pain   NECK: No pain or stiffness  RESPIRATORY: No cough, wheezing, hemoptysis; No shortness of breath  CARDIOVASCULAR: No chest pain or palpitations  GASTROINTESTINAL: No abdominal or epigastric pain. No nausea, vomiting, or hematemesis; No diarrhea or constipation. No melena or hematochezia.  GENITOURINARY: No dysuria, frequency or hematuria  NEUROLOGICAL: No numbness or weakness  SKIN: No itching, burning, rashes, or lesions   All other review of systems is negative unless indicated above.    MEDICATIONS:  MEDICATIONS  (STANDING):  dexAMETHasone  Injectable 6 milliGRAM(s) IV Push daily  digoxin     Tablet 0.25 milliGRAM(s) Oral daily  diltiazem    Tablet 30 milliGRAM(s) Oral three times a day  enoxaparin Injectable 140 milliGRAM(s) SubCutaneous every 12 hours  remdesivir  IVPB   IV Intermittent   remdesivir  IVPB 100 milliGRAM(s) IV Intermittent every 24 hours      PHYSICAL EXAM:  T(C): 36.9 (03-01-21 @ 12:25), Max: 37.3 (03-01-21 @ 05:35)  HR: 93 (03-01-21 @ 18:54) (84 - 101)  BP: 125/81 (03-01-21 @ 12:25) (107/68 - 127/89)  RR: 18 (03-01-21 @ 14:39) (18 - 21)  SpO2: 95% (03-01-21 @ 18:54) (88% - 96%)  Wt(kg): --  I&O's Summary    28 Feb 2021 07:01  -  01 Mar 2021 07:00  --------------------------------------------------------  IN: 850 mL / OUT: 1100 mL / NET: -250 mL    01 Mar 2021 07:01  -  01 Mar 2021 19:39  --------------------------------------------------------  IN: 250 mL / OUT: 475 mL / NET: -225 mL          Appearance: NAD  HEENT:   Normal oral mucosa, PERRL, EOMI	  Lymphatic: No lymphadenopathy , no edema  Cardiovascular: Irregular  S1 S2, No JVD, + murmurs , Peripheral pulses palpable 2+ bilaterally  Respiratory: decreased bs 	  Gastrointestinal:  Soft, Non-tender, + BS	  Skin: No rashes, No ecchymoses, No cyanosis, warm to touch  Musculoskeletal: Normal range of motion, normal strength  Psychiatry:  Mood & affect appropriate  Ext: No edema      LABS:    CARDIAC MARKERS:      dD-Dimer Assay, Quantitative: 282 ng/mL DDU (03.01.21 @ 06:33)                           14.4   11.96 )-----------( 225      ( 01 Mar 2021 06:33 )             42.8     03-01    133<L>  |  93<L>  |  31<H>  ----------------------------<  123<H>  3.6   |  23  |  1.31<H>    Ca    8.5      01 Mar 2021 06:33    TPro  7.4  /  Alb  3.1<L>  /  TBili  0.7  /  DBili  0.2  /  AST  224<H>  /  ALT  76<H>  /  AlkPhos  49  03-01    proBNP:   Lipid Profile:   HgA1c:   TSH:             TELEMETRY: 	AF    ECG:  	  RADIOLOGY:   DIAGNOSTIC TESTING:  [ ] Echocardiogram:  [ ]  Catheterization:  [ ] Stress Test:    OTHER:

## 2021-03-01 NOTE — PROGRESS NOTE ADULT - SUBJECTIVE AND OBJECTIVE BOX
Follow-up Pulm Progress Note  Francisco Ramon MD  [phone]    Afebrile on Decadron and Remdesevir  Patient appears tachypneic on 100% NRB  D-dimer 282    Medications:  Vital Signs Last 24 Hrs  T(C): 37.3 (01 Mar 2021 05:35), Max: 37.3 (01 Mar 2021 05:35)  T(F): 99.2 (01 Mar 2021 05:35), Max: 99.2 (01 Mar 2021 05:35)  HR: 84 (01 Mar 2021 05:35) (69 - 101)  BP: 107/68 (01 Mar 2021 05:35) (107/68 - 134/88)  BP(mean): --  RR: 20 (01 Mar 2021 05:35) (20 - 21)  SpO2: 88% (01 Mar 2021 05:35) (88% - 95%)      02-28 @ 07:01  -  03-01 @ 07:00  --------------------------------------------------------  IN: 850 mL / OUT: 1100 mL / NET: -250 mL    LABS:                        14.4   11.96 )-----------( 225      ( 01 Mar 2021 06:33 )             42.8     03-01    133<L>  |  93<L>  |  31<H>  ----------------------------<  123<H>  3.6   |  23  |  1.31<H>    Ca    8.5      01 Mar 2021 06:33    TPro  7.4  /  Alb  3.1<L>  /  TBili  0.7  /  DBili  0.2  /  AST  224<H>  /  ALT  76<H>  /  AlkPhos  49  03-01      PT/INR - ( 01 Mar 2021 07:16 )   PT: 13.2 sec;   INR: 1.12 ratio           Procalcitonin, Serum: 0.58 ng/mL (02-27-21 @ 13:25)    Serum Pro-Brain Natriuretic Peptide: 277 pg/mL (02-28-21 @ 06:27)      CULTURES:  Culture Results:   No growth to date. (02-27 @ 18:02)  Culture Results:   No growth to date. (02-27 @ 18:02)    Most recent blood culture -- 02-27 @ 18:02   -- -- .Blood Blood 02-27 @ 18:02      Physical Examination:  PULM: No wheeze  CVS: Regular rate and rhythm, no murmurs, rubs, or gallops  ABD: Soft, non-tender  EXT:  No clubbing, cyanosis, or edema    RADIOLOGY REVIEWED  CXR:    CT chest:    PROCEDURE DATE:  02/27/2021        INTERPRETATION:  CLINICAL INFORMATION: Hypoxia, Covid 19 infection, assess for pulmonary embolism    COMPARISON: Chest radiograph from same day    PROCEDURE:  CT Angiography of the Chest.  90 ml of Omnipaque 350 was injected intravenously. 10 ml were discarded.  Sagittal and coronal reformats were performed as well as 3D (MIP) reconstructions.    FINDINGS:    LUNGS AND AIRWAYS: Patent central airways.  Bilateral patchy groundglass and consolidative opacities with peripheral and lower lobe predominance.  PLEURA: No pleural effusion.  MEDIASTINUM AND KASIE: No lymphadenopathy.  VESSELS: No central, main, lobar, or segmental pulmonary embolism. Evaluation of the subsegmental pulmonary arteries is limited/nondiagnostic secondary to respiratory motion.  HEART: Heart size is normal. No pericardial effusion.  CHEST WALL AND LOWER NECK: Prominent right upper paratracheal lymph node may be reactive  VISUALIZED UPPER ABDOMEN: Small hiatal hernia.  BONES: Spinal degenerative changes.    IMPRESSION:  No central, main, lobar, or segmental pulmonary embolism. Evaluation of the subsegmental pulmonary arteries is limited/nondiagnostic secondary to respiratory motion.    Bilateral patchy groundglass and consolidative opacities with peripheral and lower lobe predominance, compatible with patient's known Covid 19 infection. Follow-up to resolution recommended.    TTE:

## 2021-03-01 NOTE — PHYSICAL THERAPY INITIAL EVALUATION ADULT - PLANNED THERAPY INTERVENTIONS, PT EVAL
GOAL: Pt will ascend/descend (5) steps (I) with U HR and step over step pattern in 4 weeks./bed mobility training/gait training/strengthening/transfer training

## 2021-03-01 NOTE — PROGRESS NOTE ADULT - ASSESSMENT
ACute hypoxemic respiratory failure  Obesity  Multilobar viral pneumonia due to COVID 19    REC    High flow nasal cannula ordered: DC NRB and target FIO2 to saturation 88-90%  Continue AC  LE dopplers ordered  Decdron 6 mg IV qd X 10 days  Complete remdesevir  Trend inflammtory markers including D-dimer

## 2021-03-01 NOTE — PHYSICAL THERAPY INITIAL EVALUATION ADULT - DISCHARGE DISPOSITION, PT EVAL
TBD pending functional evaluation Anticipating Home with assist from family as needed and no skilled PT needs/home w/ assist/no skilled PT needs Home with assist from family as needed and no skilled PT needs/home w/ assist/no skilled PT needs

## 2021-03-01 NOTE — PHYSICAL THERAPY INITIAL EVALUATION ADULT - PHYSICAL ASSIST/NONPHYSICAL ASSIST: SIT/STAND, REHAB EVAL
verbal cues/nonverbal cues (demo/gestures)/1 person assist stand by assist/supervision/verbal cues/nonverbal cues (demo/gestures)

## 2021-03-01 NOTE — PHYSICAL THERAPY INITIAL EVALUATION ADULT - PERTINENT HX OF CURRENT PROBLEM, REHAB EVAL
65y Male, hx of morbid obesity, HTN on lisinopril, complaining of shortness of breath. covid + dx 5d prior. no home O2 requirement, former smoker. has dyspnea at rest and on exertion. has not taken steroids, no hospitalization for covid. Pt denies CP, [facility]. Pt a/w  acute hypoxemic respiratory failure, started on Remdesivir, Dexamethasone, full dose AC w sc Lovenox,

## 2021-03-01 NOTE — PHYSICAL THERAPY INITIAL EVALUATION ADULT - GENERAL OBSERVATIONS, REHAB EVAL
Pt rec'd sitting in bedside chair in NAD, VSS, +Bipap/Cpap, +A&Ox4, +IV, agreeable to bedside evaluation

## 2021-03-01 NOTE — CHART NOTE - NSCHARTNOTEFT_GEN_A_CORE
[facility] Event Note    Patient placed on High Flow by Pulm this afternoon.  HF settings were later increased to 60 L/100% FiO2. Discussed with Dr. Ramon to have goal O2sat 88-90%.  Pt in no respiratory distress with RR 20-24. Speaking in full sentences.   Denying any SOB, CP, dizziness, abd pain, N/V,   Patient's O2 sat improves when he lays on side. Instructed patient to stay on side.   ABG on HF this afternoon with Ph 7.52 pCO2 32 pO2 60 and O2 sat 92.    This evening however patient with O2 sat de-sating to 85-86% on HF. Still asymptomatic.  Placed on BIPAP with improvement to 90%. Another ABG ordered while on BIPAP.    Above discussed with Dr. Langley who is agreement with plan.  Cont to monitor patient. Will endorse to night team.    Denisse Ruggiero PA-C  Dept of Medicine  # [phone]

## 2021-03-01 NOTE — PHYSICAL THERAPY INITIAL EVALUATION ADULT - PHYSICAL ASSIST/NONPHYSICAL ASSIST: STAND/SIT, REHAB EVAL
verbal cues/nonverbal cues (demo/gestures)/1 person assist stand by assist/verbal cues/nonverbal cues (demo/gestures)/1 person assist

## 2021-03-02 LAB
ALBUMIN SERPL ELPH-MCNC: 3 G/DL — LOW (ref 3.3–5)
ALP SERPL-CCNC: 46 U/L — SIGNIFICANT CHANGE UP (ref 40–120)
ALT FLD-CCNC: 68 U/L — HIGH (ref 10–45)
ANION GAP SERPL CALC-SCNC: 11 MMOL/L — SIGNIFICANT CHANGE UP (ref 5–17)
AST SERPL-CCNC: 153 U/L — HIGH (ref 10–40)
BILIRUB DIRECT SERPL-MCNC: 0.2 MG/DL — SIGNIFICANT CHANGE UP (ref 0–0.2)
BILIRUB INDIRECT FLD-MCNC: 0.5 MG/DL — SIGNIFICANT CHANGE UP (ref 0.2–1)
BILIRUB SERPL-MCNC: 0.7 MG/DL — SIGNIFICANT CHANGE UP (ref 0.2–1.2)
BUN SERPL-MCNC: 34 MG/DL — HIGH (ref 7–23)
CALCIUM SERPL-MCNC: 8.4 MG/DL — SIGNIFICANT CHANGE UP (ref 8.4–10.5)
CHLORIDE SERPL-SCNC: 96 MMOL/L — SIGNIFICANT CHANGE UP (ref 96–108)
CO2 SERPL-SCNC: 27 MMOL/L — SIGNIFICANT CHANGE UP (ref 22–31)
CREAT SERPL-MCNC: 1.29 MG/DL — SIGNIFICANT CHANGE UP (ref 0.5–1.3)
GLUCOSE SERPL-MCNC: 114 MG/DL — HIGH (ref 70–99)
HCT VFR BLD CALC: 40.7 % — SIGNIFICANT CHANGE UP (ref 39–50)
HGB BLD-MCNC: 13.8 G/DL — SIGNIFICANT CHANGE UP (ref 13–17)
MAGNESIUM SERPL-MCNC: 3 MG/DL — HIGH (ref 1.6–2.6)
MCHC RBC-ENTMCNC: 29.7 PG — SIGNIFICANT CHANGE UP (ref 27–34)
MCHC RBC-ENTMCNC: 33.9 GM/DL — SIGNIFICANT CHANGE UP (ref 32–36)
MCV RBC AUTO: 87.7 FL — SIGNIFICANT CHANGE UP (ref 80–100)
NRBC # BLD: 0 /100 WBCS — SIGNIFICANT CHANGE UP (ref 0–0)
PHOSPHATE SERPL-MCNC: 3.8 MG/DL — SIGNIFICANT CHANGE UP (ref 2.5–4.5)
PLATELET # BLD AUTO: 268 K/UL — SIGNIFICANT CHANGE UP (ref 150–400)
POTASSIUM SERPL-MCNC: 3.9 MMOL/L — SIGNIFICANT CHANGE UP (ref 3.5–5.3)
POTASSIUM SERPL-SCNC: 3.9 MMOL/L — SIGNIFICANT CHANGE UP (ref 3.5–5.3)
PROT SERPL-MCNC: 6.8 G/DL — SIGNIFICANT CHANGE UP (ref 6–8.3)
RBC # BLD: 4.64 M/UL — SIGNIFICANT CHANGE UP (ref 4.2–5.8)
RBC # FLD: 13.5 % — SIGNIFICANT CHANGE UP (ref 10.3–14.5)
SODIUM SERPL-SCNC: 134 MMOL/L — LOW (ref 135–145)
WBC # BLD: 11.88 K/UL — HIGH (ref 3.8–10.5)
WBC # FLD AUTO: 11.88 K/UL — HIGH (ref 3.8–10.5)

## 2021-03-02 PROCEDURE — 93970 EXTREMITY STUDY: CPT | Mod: 26

## 2021-03-02 RX ORDER — DIGOXIN 250 MCG
0.2 TABLET ORAL ONCE
Refills: 0 | Status: COMPLETED | OUTPATIENT
Start: 2021-03-02 | End: 2021-03-02

## 2021-03-02 RX ORDER — ACETAMINOPHEN 500 MG
1000 TABLET ORAL ONCE
Refills: 0 | Status: DISCONTINUED | OUTPATIENT
Start: 2021-03-02 | End: 2021-03-08

## 2021-03-02 RX ADMIN — Medication 30 MILLIGRAM(S): at 09:15

## 2021-03-02 RX ADMIN — Medication 6 MILLIGRAM(S): at 05:21

## 2021-03-02 RX ADMIN — Medication 30 MILLIGRAM(S): at 22:54

## 2021-03-02 RX ADMIN — REMDESIVIR 500 MILLIGRAM(S): 5 INJECTION INTRAVENOUS at 12:42

## 2021-03-02 RX ADMIN — Medication 0.2 MILLIGRAM(S): at 05:21

## 2021-03-02 RX ADMIN — ENOXAPARIN SODIUM 140 MILLIGRAM(S): 100 INJECTION SUBCUTANEOUS at 17:42

## 2021-03-02 RX ADMIN — ENOXAPARIN SODIUM 140 MILLIGRAM(S): 100 INJECTION SUBCUTANEOUS at 05:22

## 2021-03-02 NOTE — PROGRESS NOTE ADULT - SUBJECTIVE AND OBJECTIVE BOX
Subjective: Patient seen and examined. No new events except as noted.   On High flow   feel yeny   didnt sleep well      REVIEW OF SYSTEMS:    CONSTITUTIONAL: + weakness, fevers or chills  EYES/ENT: No visual changes;  No vertigo or throat pain   NECK: No pain or stiffness  RESPIRATORY: No cough, wheezing, hemoptysis; + shortness of breath  CARDIOVASCULAR: No chest pain or palpitations  GASTROINTESTINAL: No abdominal or epigastric pain. No nausea, vomiting, or hematemesis; No diarrhea or constipation. No melena or hematochezia.  GENITOURINARY: No dysuria, frequency or hematuria  NEUROLOGICAL: No numbness or weakness  SKIN: No itching, burning, rashes, or lesions   All other review of systems is negative unless indicated above.    MEDICATIONS:  MEDICATIONS  (STANDING):  dexAMETHasone  Injectable 6 milliGRAM(s) IV Push daily  digoxin     Tablet 0.25 milliGRAM(s) Oral daily  diltiazem    Tablet 30 milliGRAM(s) Oral three times a day  enoxaparin Injectable 140 milliGRAM(s) SubCutaneous every 12 hours  remdesivir  IVPB   IV Intermittent   remdesivir  IVPB 100 milliGRAM(s) IV Intermittent every 24 hours      PHYSICAL EXAM:  T(C): 36.7 (03-02-21 @ 04:42), Max: 36.9 (03-01-21 @ 12:25)  HR: 85 (03-02-21 @ 09:34) (78 - 98)  BP: 111/82 (03-02-21 @ 05:01) (102/71 - 125/81)  RR: 20 (03-02-21 @ 04:42) (18 - 22)  SpO2: 91% (03-02-21 @ 09:34) (84% - 97%)  Wt(kg): --  I&O's Summary    01 Mar 2021 07:01  -  02 Mar 2021 07:00  --------------------------------------------------------  IN: 250 mL / OUT: 1225 mL / NET: -975 mL          Appearance: NAD Nasal high flow   HEENT:   Dry  oral mucosa, PERRL, EOMI	  Lymphatic: No lymphadenopathy , no edema  Cardiovascular: regular  S1 S2, No JVD, + murmurs , Peripheral pulses palpable 2+ bilaterally  Respiratory: decreased bs 	  Gastrointestinal:  Soft, Non-tender, + BS	  Skin: No rashes, No ecchymoses, No cyanosis, warm to touch  Musculoskeletal: Normal range of motion, normal strength  Psychiatry:  Mood & affect appropriate  Ext: No edema    LABS:    CARDIAC MARKERS:                                13.8   11.88 )-----------( 268      ( 02 Mar 2021 06:01 )             40.7     03-02    134<L>  |  96  |  34<H>  ----------------------------<  114<H>  3.9   |  27  |  1.29    Ca    8.4      02 Mar 2021 06:01  Phos  3.8     03-02  Mg     3.0     03-02    TPro  6.8  /  Alb  3.0<L>  /  TBili  0.7  /  DBili  0.2  /  AST  153<H>  /  ALT  68<H>  /  AlkPhos  46  03-02    proBNP:   Lipid Profile:   HgA1c:   TSH:             TELEMETRY: 	SR     ECG:  	  RADIOLOGY:   DIAGNOSTIC TESTING:  [ ] Echocardiogram:  [ ]  Catheterization:  [ ] Stress Test:    OTHER:

## 2021-03-02 NOTE — PROGRESS NOTE ADULT - SUBJECTIVE AND OBJECTIVE BOX
Patient is a 65y old  Male who presents with a chief complaint of acute hypoxemic respiratory failure (02 Mar 2021 17:41)      SUBJECTIVE / OVERNIGHT EVENTS: on BIPAP    MEDICATIONS  (STANDING):  acetaminophen  IVPB .. 1000 milliGRAM(s) IV Intermittent once  dexAMETHasone  Injectable 6 milliGRAM(s) IV Push daily  digoxin     Tablet 0.25 milliGRAM(s) Oral daily  diltiazem    Tablet 30 milliGRAM(s) Oral three times a day  enoxaparin Injectable 140 milliGRAM(s) SubCutaneous every 12 hours  remdesivir  IVPB   IV Intermittent   remdesivir  IVPB 100 milliGRAM(s) IV Intermittent every 24 hours    MEDICATIONS  (PRN):      Vital Signs Last 24 Hrs  T(F): 97.8 (03-02-21 @ 13:52), Max: 98.5 (03-01-21 @ 21:41)  HR: 72 (03-02-21 @ 15:07) (70 - 98)  BP: 130/77 (03-02-21 @ 13:52) (102/71 - 130/77)  RR: 18 (03-02-21 @ 13:52) (18 - 22)  SpO2: 90% (03-02-21 @ 15:07) (84% - 97%)  Telemetry:   CAPILLARY BLOOD GLUCOSE        I&O's Summary    01 Mar 2021 07:01  -  02 Mar 2021 07:00  --------------------------------------------------------  IN: 250 mL / OUT: 1225 mL / NET: -975 mL    02 Mar 2021 07:01  -  02 Mar 2021 19:26  --------------------------------------------------------  IN: 250 mL / OUT: 0 mL / NET: 250 mL        PHYSICAL EXAM:  GENERAL: NAD, well-developed  HEAD:  Atraumatic, Normocephalic  EYES: EOMI, PERRLA, conjunctiva and sclera clear  NECK: Supple, No JVD  CHEST/LUNG: Clear to auscultation bilaterally; No wheeze  HEART: Regular rate and rhythm; No murmurs, rubs, or gallops  ABDOMEN: Soft, Nontender, Nondistended; Bowel sounds present  EXTREMITIES:  2+ Peripheral Pulses, No clubbing, cyanosis, or edema  PSYCH: AAOx3  NEUROLOGY: non-focal  SKIN: No rashes or lesions    LABS:                        13.8   11.88 )-----------( 268      ( 02 Mar 2021 06:01 )             40.7     03-02    134<L>  |  96  |  34<H>  ----------------------------<  114<H>  3.9   |  27  |  1.29    Ca    8.4      02 Mar 2021 06:01  Phos  3.8     03-02  Mg     3.0     03-02    TPro  6.8  /  Alb  3.0<L>  /  TBili  0.7  /  DBili  0.2  /  AST  153<H>  /  ALT  68<H>  /  AlkPhos  46  03-02    PT/INR - ( 01 Mar 2021 07:16 )   PT: 13.2 sec;   INR: 1.12 ratio                   RADIOLOGY & ADDITIONAL TESTS:    Imaging Personally Reviewed:    Consultant(s) Notes Reviewed:      Care Discussed with Consultants/Other Providers:

## 2021-03-02 NOTE — PROGRESS NOTE ADULT - ASSESSMENT
ACute hypoxemic respiratory failure  Obesity  Multilobar viral pneumonia due to COVID 19  Atrial Fibrillation: now back in NSR    REC    Transition to High [place] nasal cannula from BIPAP as tolerated  MICU evaluation  Continue AC  LE dopplers ordered  Decdron 6 mg IV qd X 10 days  Complete remdesevir  Trend inflammtory markers including D-dimer

## 2021-03-02 NOTE — PROGRESS NOTE ADULT - ASSESSMENT
The patient is a 65y Male, hx of morbid obesity, HTN on lisinopril, complaining of shortness of breath. covid + dx 5d prior. no home O2 requirement, former smoker. has dyspnea at rest and on exertion. has not taken steroids, no hospitalization for covid. denies CP. [facility] (27 Feb 2021 22:29)    ER vital:  T 98.4, P 178, /85.  Pt satting 85% on RA, placed on nasal cannula, advanced to NRB.  Covid pcr (+), covid serologies (-).   CT angio chest (-) for PE, (+) b/l patchy opacities.  Pt with elevated AST (>5x ULN but <10x) and ALT.   Pt started on remdesivir and dexamethasone.     Acute covid illness:    - Cont remdesivir and dexamethasone.  Monitor daily LFTs and renal function while pt on remdesivir.  LFTs elevated, possibly secondary to viral infection.  Currently ALT < 10x ULN, can cont remdesivir.  LFTs slowly improving.     - Monitor pulse ox, pt on supplemental O2 - now on Bipap    - Monitor inflammatory markers q72    DD:  282 <-- 343  ferritin: 1468 <-- 1636  pct: 0.58  crp: 10.3 <-- 10.9    - Pt on full dose lovenox for dvt/pe.  CT angio chest (-) for PE.      Will follow,    Wandy Luke  [phone]

## 2021-03-02 NOTE — PROGRESS NOTE ADULT - SUBJECTIVE AND OBJECTIVE BOX
Infectious Diseases progress note:    Subjective: Afebrile, pt on bipap.  WBC 11.8    ROS:  CONSTITUTIONAL:  No fever, chills, rigors  CARDIOVASCULAR:  No chest pain or palpitations  RESPIRATORY:   On bipap, sob with exertion  GASTROINTESTINAL:  No abd pain, N/V, diarrhea/constipation  EXTREMITIES:  No swelling or joint pain  GENITOURINARY:  No burning on urination, increased frequency or urgency.  No flank pain  NEUROLOGIC:  No HA, visual disturbances  SKIN: No rashes    Allergies    Actifed (Headache)  Sudafed (Headache)    Intolerances        ANTIBIOTICS/RELEVANT:  antimicrobials  remdesivir  IVPB   IV Intermittent   remdesivir  IVPB 100 milliGRAM(s) IV Intermittent every 24 hours    immunologic:    OTHER:  acetaminophen  IVPB .. 1000 milliGRAM(s) IV Intermittent once  dexAMETHasone  Injectable 6 milliGRAM(s) IV Push daily  digoxin     Tablet 0.25 milliGRAM(s) Oral daily  diltiazem    Tablet 30 milliGRAM(s) Oral three times a day  enoxaparin Injectable 140 milliGRAM(s) SubCutaneous every 12 hours      Objective:  Vital Signs Last 24 Hrs  T(C): 36.6 (02 Mar 2021 13:52), Max: 36.9 (01 Mar 2021 21:41)  T(F): 97.8 (02 Mar 2021 13:52), Max: 98.5 (01 Mar 2021 21:41)  HR: 72 (02 Mar 2021 15:07) (70 - 98)  BP: 130/77 (02 Mar 2021 13:52) (102/71 - 130/77)  BP(mean): --  RR: 18 (02 Mar 2021 13:52) (18 - 22)  SpO2: 90% (02 Mar 2021 15:07) (84% - 97%)    PHYSICAL EXAM:  Constitutional:NAD  Eyes:KRISTEN, EOMI  Ear/Nose/Throat: no thrush, mucositis.  Moist mucous membranes	  Neck:no JVD, no lymphadenopathy, supple  Respiratory: CTA berna  Cardiovascular: S1S2 RRR, no murmurs  Gastrointestinal:soft, nontender,  nondistended (+) BS  Extremities:no e/e/c  Skin:  no rashes, open wounds or ulcerations        LABS:                        13.8   11.88 )-----------( 268      ( 02 Mar 2021 06:01 )             40.7     03-02    134<L>  |  96  |  34<H>  ----------------------------<  114<H>  3.9   |  27  |  1.29    Ca    8.4      02 Mar 2021 06:01  Phos  3.8     03-02  Mg     3.0     03-02    TPro  6.8  /  Alb  3.0<L>  /  TBili  0.7  /  DBili  0.2  /  AST  153<H>  /  ALT  68<H>  /  AlkPhos  46  03-02    PT/INR - ( 01 Mar 2021 07:16 )   PT: 13.2 sec;   INR: 1.12 ratio               Procalcitonin, Serum: 0.58 (02-27 @ 13:25)                    MICROBIOLOGY:      Culture - Blood (02.27.21 @ 18:02)   Specimen Source: .Blood Blood   Culture Results:   No growth to date.         RADIOLOGY & ADDITIONAL STUDIES:    < from: Xray Chest 1 View- PORTABLE-Urgent (Xray Chest 1 View- PORTABLE-Urgent .) (03.01.21 @ 17:58) >    Impression:    The heart is enlarged. Diffuse airspace opacity seen throughout both lungs which remain unchanged when compared to previous study done on February 27, 2021. Changes compatible with Covid 19 pneumonia.    < end of copied text >

## 2021-03-02 NOTE — CHART NOTE - NSCHARTNOTEFT_GEN_A_CORE
Medicine PA Note     SANA MALCOLM  MRN-[id number]  Allergies         Notified by RN for afib RVR, HR-120's-140's on telemetry. Pt seen and evaluated at bedside. Pt with no complaints, sitting up in bed. Pt denies headache, chest pain, sob, palpitations, n/v/d, weakness, dizziness.     Vital Signs Last 24 Hrs  T(C): 36.6 (03-01-21 @ 23:21), Max: 37.3 (03-01-21 @ 05:35)  T(F): 97.9 (03-01-21 @ 23:21), Max: 99.2 (03-01-21 @ 05:35)  HR: 92 (03-01-21 @ 23:21) (78 - 98)  BP: 112/84 (03-01-21 @ 23:21) (102/71 - 125/81)  BP(mean): --  RR: 20 (03-01-21 @ 23:21) (18 - 20)  SpO2: 92% (03-01-21 @ 23:21) (88% - 96%)  Daily     Daily   I&O's Summary    28 Feb 2021 07:01  -  01 Mar 2021 07:00  --------------------------------------------------------  IN: 850 mL / OUT: 1100 mL / NET: -250 mL    01 Mar 2021 07:01  -  02 Mar 2021 00:56  --------------------------------------------------------  IN: 250 mL / OUT: 475 mL / NET: -225 mL      CAPILLARY BLOOD GLUCOSE                          14.4   11.96 )-----------( 225      ( 01 Mar 2021 06:33 )             42.8     03-01    133<L>  |  93<L>  |  31<H>  ----------------------------<  123<H>  3.6   |  23  |  1.31<H>    Ca    8.5      01 Mar 2021 06:33    TPro  7.4  /  Alb  3.1<L>  /  TBili  0.7  /  DBili  0.2  /  AST  224<H>  /  ALT  76<H>  /  AlkPhos  49  03-01    PT/INR - ( 01 Mar 2021 07:16 )   PT: 13.2 sec;   INR: 1.12 ratio               ABG - ( 01 Mar 2021 21:42 )  pH, Arterial: 7.50  pH, Blood: x     /  pCO2: 32    /  pO2: 69    / HCO3: 25    / Base Excess: 2.4   /  SaO2: 94            PHYSICAL EXAM:  GENERAL: NAD, well-developed  CHEST/LUNG: Clear to auscultation bilaterally; No wheezes, rhonchi, rales  HEART: Tachycardic, irregularly irregular; No murmurs, rubs, or gallops  EXTREMITIES:  2+ Peripheral Pulses, No clubbing, cyanosis, or edema      Assessment/Plan: HPI:  The patient is a 65y Male, hx of morbid obesity, HTN on lisinopril, complaining of shortness of breath. covid + dx 5d prior. no home O2 requirement, former smoker. has dyspnea at rest and on exertion. has not taken steroids, no hospitalization for covid. denies CP. [facility] (27 Feb 2021 22:29)    >VS hemodynamically stable aside from HR sustaining 120s-140's afib on telemetry  > Pt due for PO Cardizem now, however is on BIPAP  > Will order for Cardizem 5 IVP for now  > C/w with digoxin, cardizem and full dose lovenox for afib  > Cardiology f/u in AM  > Will attempt to wean Pt down to [place] tonight   > Will endorse to AM team, attending to follow    Judi Abdi PA-C,   Department of Medicine Medicine PA Note     SANA MALCOLM  MRN-[id number]  Allergies         Notified by RN for afib RVR, HR-120's-140's on telemetry. Pt seen and evaluated at bedside. Pt with no complaints, sitting up in bed. Pt denies headache, chest pain, sob, palpitations, n/v/d, weakness, dizziness.     Vital Signs Last 24 Hrs  T(C): 36.6 (03-01-21 @ 23:21), Max: 37.3 (03-01-21 @ 05:35)  T(F): 97.9 (03-01-21 @ 23:21), Max: 99.2 (03-01-21 @ 05:35)  HR: 92 (03-01-21 @ 23:21) (78 - 98)  BP: 112/84 (03-01-21 @ 23:21) (102/71 - 125/81)  BP(mean): --  RR: 20 (03-01-21 @ 23:21) (18 - 20)  SpO2: 92% (03-01-21 @ 23:21) (88% - 96%)  Daily     Daily   I&O's Summary    28 Feb 2021 07:01  -  01 Mar 2021 07:00  --------------------------------------------------------  IN: 850 mL / OUT: 1100 mL / NET: -250 mL    01 Mar 2021 07:01  -  02 Mar 2021 00:56  --------------------------------------------------------  IN: 250 mL / OUT: 475 mL / NET: -225 mL      CAPILLARY BLOOD GLUCOSE                          14.4   11.96 )-----------( 225      ( 01 Mar 2021 06:33 )             42.8     03-01    133<L>  |  93<L>  |  31<H>  ----------------------------<  123<H>  3.6   |  23  |  1.31<H>    Ca    8.5      01 Mar 2021 06:33    TPro  7.4  /  Alb  3.1<L>  /  TBili  0.7  /  DBili  0.2  /  AST  224<H>  /  ALT  76<H>  /  AlkPhos  49  03-01    PT/INR - ( 01 Mar 2021 07:16 )   PT: 13.2 sec;   INR: 1.12 ratio               ABG - ( 01 Mar 2021 21:42 )  pH, Arterial: 7.50  pH, Blood: x     /  pCO2: 32    /  pO2: 69    / HCO3: 25    / Base Excess: 2.4   /  SaO2: 94            PHYSICAL EXAM:  GENERAL: NAD, well-developed  CHEST/LUNG: Clear to auscultation bilaterally; No wheezes, rhonchi, rales  HEART: Tachycardic, irregularly irregular; No murmurs, rubs, or gallops  EXTREMITIES:  2+ Peripheral Pulses, No clubbing, cyanosis, or edema      Assessment/Plan: HPI:  The patient is a 65y Male, hx of morbid obesity, HTN on lisinopril, complaining of shortness of breath. covid + dx 5d prior. no home O2 requirement, former smoker. has dyspnea at rest and on exertion. has not taken steroids, no hospitalization for covid. denies CP. [facility] (27 Feb 2021 22:29)    >VS hemodynamically stable aside from HR sustaining 120s-140's afib on telemetry  > Pt due for PO Cardizem now, however is on BIPAP  > Will order for Cardizem 5 IVP for now  > C/w with digoxin, cardizem and full dose lovenox for afib  >C/w tele monitoring  > Cardiology f/u in AM  > Will attempt to wean Pt down to [place] tonight   > Will endorse to AM team, attending to follow    Judi Abdi PA-C,   Department of Medicine    Addendum: Notified by RN, Pt's HR spontaneously went down to 90's afib with out administration of Cardizem IVP. Pt also successfully weaned off of bipap and is now on HF 60L/100%. Will give PO cardizem for now and c/w to monitor Pt on telemetry.

## 2021-03-02 NOTE — PROGRESS NOTE ADULT - SUBJECTIVE AND OBJECTIVE BOX
Follow-up Pulm Progress Note  Francisco Ramon MD  [phone]    Afebrile on Decadron and Remdesevir  SLeeping semi-supine on BIPAP: appears comfortable without tachypnea: ABG with mild resp alkalosis  Inflammatory markers remain elevated      Vital Signs Last 24 Hrs  T(C): 36.7 (02 Mar 2021 04:42), Max: 36.9 (01 Mar 2021 21:41)  T(F): 98 (02 Mar 2021 04:42), Max: 98.5 (01 Mar 2021 21:41)  HR: 85 (02 Mar 2021 09:34) (78 - 98)  BP: 111/82 (02 Mar 2021 05:01) (102/71 - 117/79)  BP(mean): --  RR: 20 (02 Mar 2021 04:42) (18 - 22)  SpO2: 91% (02 Mar 2021 09:34) (84% - 97%)    ABG - ( 01 Mar 2021 21:42 )  pH, Arterial: 7.50  pH, Blood: x     /  pCO2: 32    /  pO2: 69    / HCO3: 25    / Base Excess: 2.4   /  SaO2: 94                              13.8   11.88 )-----------( 268      ( 02 Mar 2021 06:01 )             40.7     03-02    134<L>  |  96  |  34<H>  ----------------------------<  114<H>  3.9   |  27  |  1.29    Ca    8.4      02 Mar 2021 06:01  Phos  3.8     03-02  Mg     3.0     03-02    TPro  6.8  /  Alb  3.0<L>  /  TBili  0.7  /  DBili  0.2  /  AST  153<H>  /  ALT  68<H>  /  AlkPhos  46  03-02    Procalcitonin, Serum: 0.58 ng/mL (02-27-21 @ 13:25)    Serum Pro-Brain Natriuretic Peptide: 277 pg/mL (02-28-21 @ 06:27)      CULTURES:  Culture Results:   No growth to date. (02-27 @ 18:02)  Culture Results:   No growth to date. (02-27 @ 18:02)    Most recent blood culture -- 02-27 @ 18:02   -- -- .Blood Blood 02-27 @ 18:02      Physical Examination:  PULM: No wheeze  CVS: Regular rate and rhythm, no murmurs, rubs, or gallops  ABD: Soft, non-tender  EXT:  No clubbing, cyanosis, or edema    RADIOLOGY REVIEWED  CXR:    CT chest:    PROCEDURE DATE:  02/27/2021        INTERPRETATION:  CLINICAL INFORMATION: Hypoxia, Covid 19 infection, assess for pulmonary embolism    COMPARISON: Chest radiograph from same day    PROCEDURE:  CT Angiography of the Chest.  90 ml of Omnipaque 350 was injected intravenously. 10 ml were discarded.  Sagittal and coronal reformats were performed as well as 3D (MIP) reconstructions.    FINDINGS:    LUNGS AND AIRWAYS: Patent central airways.  Bilateral patchy groundglass and consolidative opacities with peripheral and lower lobe predominance.  PLEURA: No pleural effusion.  MEDIASTINUM AND KASIE: No lymphadenopathy.  VESSELS: No central, main, lobar, or segmental pulmonary embolism. Evaluation of the subsegmental pulmonary arteries is limited/nondiagnostic secondary to respiratory motion.  HEART: Heart size is normal. No pericardial effusion.  CHEST WALL AND LOWER NECK: Prominent right upper paratracheal lymph node may be reactive  VISUALIZED UPPER ABDOMEN: Small hiatal hernia.  BONES: Spinal degenerative changes.    IMPRESSION:  No central, main, lobar, or segmental pulmonary embolism. Evaluation of the subsegmental pulmonary arteries is limited/nondiagnostic secondary to respiratory motion.    Bilateral patchy groundglass and consolidative opacities with peripheral and lower lobe predominance, compatible with patient's known Covid 19 infection. Follow-up to resolution recommended.    TTE:

## 2021-03-03 LAB
ALBUMIN SERPL ELPH-MCNC: 2.8 G/DL — LOW (ref 3.3–5)
ALP SERPL-CCNC: 44 U/L — SIGNIFICANT CHANGE UP (ref 40–120)
ALT FLD-CCNC: 57 U/L — HIGH (ref 10–45)
ANION GAP SERPL CALC-SCNC: 9 MMOL/L — SIGNIFICANT CHANGE UP (ref 5–17)
AST SERPL-CCNC: 84 U/L — HIGH (ref 10–40)
BILIRUB SERPL-MCNC: 0.7 MG/DL — SIGNIFICANT CHANGE UP (ref 0.2–1.2)
BUN SERPL-MCNC: 40 MG/DL — HIGH (ref 7–23)
CALCIUM SERPL-MCNC: 8.5 MG/DL — SIGNIFICANT CHANGE UP (ref 8.4–10.5)
CHLORIDE SERPL-SCNC: 99 MMOL/L — SIGNIFICANT CHANGE UP (ref 96–108)
CO2 SERPL-SCNC: 30 MMOL/L — SIGNIFICANT CHANGE UP (ref 22–31)
CREAT SERPL-MCNC: 1.28 MG/DL — SIGNIFICANT CHANGE UP (ref 0.5–1.3)
GLUCOSE SERPL-MCNC: 122 MG/DL — HIGH (ref 70–99)
HCT VFR BLD CALC: 39.8 % — SIGNIFICANT CHANGE UP (ref 39–50)
HGB BLD-MCNC: 13 G/DL — SIGNIFICANT CHANGE UP (ref 13–17)
INR BLD: 1.15 RATIO — SIGNIFICANT CHANGE UP (ref 0.88–1.16)
MCHC RBC-ENTMCNC: 29.1 PG — SIGNIFICANT CHANGE UP (ref 27–34)
MCHC RBC-ENTMCNC: 32.7 GM/DL — SIGNIFICANT CHANGE UP (ref 32–36)
MCV RBC AUTO: 89.2 FL — SIGNIFICANT CHANGE UP (ref 80–100)
NRBC # BLD: 0 /100 WBCS — SIGNIFICANT CHANGE UP (ref 0–0)
PLATELET # BLD AUTO: 295 K/UL — SIGNIFICANT CHANGE UP (ref 150–400)
POTASSIUM SERPL-MCNC: 4 MMOL/L — SIGNIFICANT CHANGE UP (ref 3.5–5.3)
POTASSIUM SERPL-SCNC: 4 MMOL/L — SIGNIFICANT CHANGE UP (ref 3.5–5.3)
PROT SERPL-MCNC: 6.6 G/DL — SIGNIFICANT CHANGE UP (ref 6–8.3)
PROTHROM AB SERPL-ACNC: 13.5 SEC — SIGNIFICANT CHANGE UP (ref 10.6–13.6)
RBC # BLD: 4.46 M/UL — SIGNIFICANT CHANGE UP (ref 4.2–5.8)
RBC # FLD: 13.5 % — SIGNIFICANT CHANGE UP (ref 10.3–14.5)
SODIUM SERPL-SCNC: 138 MMOL/L — SIGNIFICANT CHANGE UP (ref 135–145)
WBC # BLD: 11.96 K/UL — HIGH (ref 3.8–10.5)
WBC # FLD AUTO: 11.96 K/UL — HIGH (ref 3.8–10.5)

## 2021-03-03 RX ADMIN — Medication 30 MILLIGRAM(S): at 22:42

## 2021-03-03 RX ADMIN — ENOXAPARIN SODIUM 140 MILLIGRAM(S): 100 INJECTION SUBCUTANEOUS at 18:18

## 2021-03-03 RX ADMIN — Medication 6 MILLIGRAM(S): at 05:42

## 2021-03-03 RX ADMIN — ENOXAPARIN SODIUM 140 MILLIGRAM(S): 100 INJECTION SUBCUTANEOUS at 05:43

## 2021-03-03 RX ADMIN — REMDESIVIR 500 MILLIGRAM(S): 5 INJECTION INTRAVENOUS at 10:49

## 2021-03-03 RX ADMIN — Medication 30 MILLIGRAM(S): at 09:52

## 2021-03-03 NOTE — PROGRESS NOTE ADULT - ASSESSMENT
The patient is a 65y Male, hx of morbid obesity, HTN on lisinopril, complaining of shortness of breath. covid + dx 5d prior. no home O2 requirement, former smoker. has dyspnea at rest and on exertion. has not taken steroids, no hospitalization for covid. denies CP. [facility] (27 Feb 2021 22:29)    ER vital:  T 98.4, P 178, /85.  Pt satting 85% on RA, placed on nasal cannula, advanced to NRB.  Covid pcr (+), covid serologies (-).   CT angio chest (-) for PE, (+) b/l patchy opacities.  Pt with elevated AST (>5x ULN but <10x) and ALT.   Pt started on remdesivir and dexamethasone.     Acute covid illness:    - Cont remdesivir and dexamethasone.  Monitor daily LFTs and renal function while pt on remdesivir.  LFTs elevated, possibly secondary to viral infection.  Currently ALT < 10x ULN, can cont remdesivir.  LFTs slowly improving.     - Monitor pulse ox, pt on supplemental O2 - now on Bipap    - Monitor inflammatory markers q72    DD:  282 <-- 343  ferritin: 1468 <-- 1636  pct: 0.58  crp: 10.3 <-- 10.9    - Pt on full dose lovenox for dvt/pe.  CT angio chest (-) for PE.   LE dopplers neg DVT    Will follow,    Wandy Luke  [phone]

## 2021-03-03 NOTE — PROGRESS NOTE ADULT - SUBJECTIVE AND OBJECTIVE BOX
Infectious Diseases progress note:    Subjective: Pt remains on bipap.   Afebrile.  WBC 11.9.      ROS:  CONSTITUTIONAL:  No fever, chills, rigors  CARDIOVASCULAR:  No chest pain or palpitations  RESPIRATORY:   No SOB, cough, dyspnea on exertion.  No wheezing  GASTROINTESTINAL:  No abd pain, N/V, diarrhea/constipation  EXTREMITIES:  No swelling or joint pain  GENITOURINARY:  No burning on urination, increased frequency or urgency.  No flank pain  NEUROLOGIC:  No HA, visual disturbances  SKIN: No rashes    Allergies    Actifed (Headache)  Sudafed (Headache)    Intolerances        ANTIBIOTICS/RELEVANT:  antimicrobials  remdesivir  IVPB   IV Intermittent   remdesivir  IVPB 100 milliGRAM(s) IV Intermittent every 24 hours    immunologic:    OTHER:  acetaminophen  IVPB .. 1000 milliGRAM(s) IV Intermittent once  dexAMETHasone  Injectable 6 milliGRAM(s) IV Push daily  diltiazem    Tablet 30 milliGRAM(s) Oral three times a day  enoxaparin Injectable 140 milliGRAM(s) SubCutaneous every 12 hours      Objective:  Vital Signs Last 24 Hrs  T(C): 36.3 (03 Mar 2021 11:26), Max: 36.6 (02 Mar 2021 13:52)  T(F): 97.4 (03 Mar 2021 11:26), Max: 97.9 (03 Mar 2021 05:13)  HR: 81 (03 Mar 2021 11:26) (72 - 84)  BP: 129/84 (03 Mar 2021 11:26) (129/84 - 144/85)  BP(mean): --  RR: 18 (03 Mar 2021 11:26) (18 - 20)  SpO2: 97% (03 Mar 2021 11:26) (90% - 97%)    PHYSICAL EXAM:  Constitutional: On bipap  Eyes:KRISTEN, EOMI  Ear/Nose/Throat: no thrush, mucositis.  Moist mucous membranes	  Neck:no JVD, no lymphadenopathy, supple  Respiratory: CTA berna  Cardiovascular: S1S2 RRR, no murmurs  Gastrointestinal:soft, nontender,  nondistended (+) BS  Extremities:no e/e/c  Skin:  no rashes, open wounds or ulcerations        LABS:                        13.0   11.96 )-----------( 295      ( 03 Mar 2021 06:03 )             39.8     03-03    138  |  99  |  40<H>  ----------------------------<  122<H>  4.0   |  30  |  1.28    Ca    8.5      03 Mar 2021 05:54  Phos  3.8     03-02  Mg     3.0     03-02    TPro  6.6  /  Alb  2.8<L>  /  TBili  0.7  /  DBili  x   /  AST  84<H>  /  ALT  57<H>  /  AlkPhos  44  03-03    PT/INR - ( 03 Mar 2021 08:26 )   PT: 13.5 sec;   INR: 1.15 ratio               Procalcitonin, Serum: 0.58 (02-27 @ 13:25)        MICROBIOLOGY:      Culture - Blood (02.27.21 @ 18:02)   Specimen Source: .Blood Blood   Culture Results:   No growth to date.     RADIOLOGY & ADDITIONAL STUDIES:    < from: [facility] Duplex Lower Ext Vein Scan, Bilat (03.02.21 @ 20:01) >  FINDINGS:    There is normal compressibility of the bilateral common femoral, femoral and popliteal veins.  Doppler examination shows normal spontaneous and phasic flow.    The calf veins were not imaged as per Covid protocol.    IMPRESSION:  No evidence of deep venous thrombosis in either lower extremity.    < end of copied text >        < from: Xray Chest 1 View- PORTABLE-Urgent (Xray Chest 1 View- PORTABLE-Urgent .) (03.01.21 @ 17:58) >    EXAM:  XR CHEST PORTABLE URGENT 1V                            PROCEDURE DATE:  03/01/2021            INTERPRETATION:  A single chest x-ray was obtained on March 1, 2021.    Hypoxia. Covid 19 pneumonia.    Impression:    The heart is enlarged. Diffuse airspace opacity seen throughout both lungs which remain unchanged when compared to previous study done on February 27, 2021. Changes compatible with Covid 19 pneumonia.    < end of copied text >

## 2021-03-03 NOTE — PROGRESS NOTE ADULT - ASSESSMENT
ACute hypoxemic respiratory failure  Obesity  Multilobar viral pneumonia due to COVID 19  Atrial Fibrillation: now back in NSR    REC    Patient not tolerating HiFlo - states nasal passages chronically obstructed  Aggressively taper FIO2 on BIPAP: target sat 88-90% - can re-try HiFlo  Continue AC  Decdron 6 mg IV qd X 10 days  Complete remdesevir  Trend inflammtory markers including D-dimer

## 2021-03-03 NOTE — PROGRESS NOTE ADULT - SUBJECTIVE AND OBJECTIVE BOX
Patient is a 65y old  Male who presents with a chief complaint of acute hypoxemic respiratory failure (03 Mar 2021 14:27)      SUBJECTIVE / OVERNIGHT EVENTS: ptn is on BIPAP, full dose AC w Lovenox    MEDICATIONS  (STANDING):  acetaminophen  IVPB .. 1000 milliGRAM(s) IV Intermittent once  dexAMETHasone  Injectable 6 milliGRAM(s) IV Push daily  diltiazem    Tablet 30 milliGRAM(s) Oral three times a day  enoxaparin Injectable 140 milliGRAM(s) SubCutaneous every 12 hours  remdesivir  IVPB   IV Intermittent   remdesivir  IVPB 100 milliGRAM(s) IV Intermittent every 24 hours    MEDICATIONS  (PRN):      Vital Signs Last 24 Hrs  T(F): 97.8 (03-03-21 @ 20:34), Max: 97.9 (03-03-21 @ 05:13)  HR: 82 (03-03-21 @ 21:54) (72 - 85)  BP: 117/78 (03-03-21 @ 20:34) (116/75 - 144/85)  RR: 20 (03-03-21 @ 20:34) (18 - 20)  SpO2: 97% (03-03-21 @ 21:54) (95% - 98%)  Telemetry:   CAPILLARY BLOOD GLUCOSE        I&O's Summary    02 Mar 2021 07:01  -  03 Mar 2021 07:00  --------------------------------------------------------  IN: 250 mL / OUT: 1150 mL / NET: -900 mL    03 Mar 2021 07:01  -  03 Mar 2021 22:23  --------------------------------------------------------  IN: 250 mL / OUT: 675 mL / NET: -425 mL        PHYSICAL EXAM:  GENERAL: NAD, well-developed  HEAD:  Atraumatic, Normocephalic  EYES: EOMI, PERRLA, conjunctiva and sclera clear  NECK: Supple, No JVD  CHEST/LUNG: Clear to auscultation bilaterally; No wheeze  HEART: Regular rate and rhythm; No murmurs, rubs, or gallops  ABDOMEN: Soft, Nontender, Nondistended; Bowel sounds present  EXTREMITIES:  2+ Peripheral Pulses, No clubbing, cyanosis, or edema  PSYCH: AAOx3  NEUROLOGY: non-focal  SKIN: No rashes or lesions    LABS:                        13.0   11.96 )-----------( 295      ( 03 Mar 2021 06:03 )             39.8     03-03    138  |  99  |  40<H>  ----------------------------<  122<H>  4.0   |  30  |  1.28    Ca    8.5      03 Mar 2021 05:54  Phos  3.8     03-02  Mg     3.0     03-02    TPro  6.6  /  Alb  2.8<L>  /  TBili  0.7  /  DBili  x   /  AST  84<H>  /  ALT  57<H>  /  AlkPhos  44  03-03    PT/INR - ( 03 Mar 2021 08:26 )   PT: 13.5 sec;   INR: 1.15 ratio                   RADIOLOGY & ADDITIONAL TESTS:    Imaging Personally Reviewed:    Consultant(s) Notes Reviewed:      Care Discussed with Consultants/Other Providers:

## 2021-03-03 NOTE — CHART NOTE - NSCHARTNOTEFT_GEN_A_CORE
Nutrition Initial Assessment    Nutrition Consult Received: Yes [   ]  No [x]    Reason for Initial Nutrition Assessment: LOS    Source of Information: Unable to conduct a face to face interview due to limited contact restrictions related to pt's medical condition and isolation precautions. Information obtained from the EMR.    Admitting Diagnosis: 65y Male admitted for Atrial fibrillation with RVR; pt c acute hypoxic respiratory failure, multilobar PNA due to COVID-19. Noted A1C of 6.2% and no past medical history of DM or pre-DM.       PAST MEDICAL & SURGICAL HISTORY:  HTN (hypertension)        Subjective Information:   -Pt currently sleeping with BiPaP on-seen through window    GI Issues:  -Last BM noted on 3/1 per chart     Current Nutrition Order: Diet, DASH/TLC:   Sodium & Cholesterol Restricted (02-27-21 @ 22:29)    PO Intake:   Good (%) [   ]    Fair (50-75%) [   ]    Poor (<50%) [   ]    Skin Integrity: no pressure injuries   Edema: none at this time     Labs:   03-03 Na138 mmol/L Glu 122 mg/dL<H> K+ 4.0 mmol/L Cr  1.28 mg/dL BUN 40 mg/dL<H> Phos n/a   Alb 2.8 g/dL<L> PAB n/a  ; 2/28: A1C 6.2%          Medications:  MEDICATIONS  (STANDING):  acetaminophen  IVPB .. 1000 milliGRAM(s) IV Intermittent once  dexAMETHasone  Injectable 6 milliGRAM(s) IV Push daily  diltiazem    Tablet 30 milliGRAM(s) Oral three times a day  enoxaparin Injectable 140 milliGRAM(s) SubCutaneous every 12 hours  remdesivir  IVPB   IV Intermittent   remdesivir  IVPB 100 milliGRAM(s) IV Intermittent every 24 hours    MEDICATIONS  (PRN):    Admitted Anthropometrics:    Height (cm): 180.3 (02-27-21 @ 12:11)  Weight (kg): 140.6 (02-27-21 @ 12:11)  BMI (kg/m2): 43.3 (02-27-21 @ 12:11)  IBW +/- 10%: 172 pounds     Nutrition Focused Physical Exam: Unable to complete due to limited isolation contact precautions at this time.     Estimated Energy Needs ( 25cal/kg- 30 yves/kg): 1955-2346cal  Estimated Protein Needs ( 1.2Gm/kg- 1.4 Gm/kg): 93-123Gm   Based on weight of: 172 pounds (78.2kg)    [x] Nutrition Diagnosis:  [  ] No active nutrition diagnosis at this time  [  ] Current medical condition precludes nutrition intervention    Goal:    Nutrition Interventions:     Recommendations:  1. Continue c current diet. Consider liberalizing diet to regular to allow for more choices when able to eat to improve upon intake.   2. Will continue to provide high protein apple juice and health shakes.     RD to follow-up per protocol.  Ethel Mosqueda MS RD CDN [facility] #[phone] Nutrition Initial Assessment    Nutrition Consult Received: Yes [   ]  No [x]    Reason for Initial Nutrition Assessment: LOS    Source of Information: Unable to conduct a face to face interview due to limited contact restrictions related to pt's medical condition and isolation precautions. Information obtained from the EMR. Spoke c emergency contact, Hilary Garcia, pt's niece over the phone at [phone]; pt currently on BiPaP.     Admitting Diagnosis: 65y Male admitted for Atrial fibrillation with RVR; pt c acute hypoxic respiratory failure, multilobar PNA due to COVID-19. Noted A1C of 6.2% and no past medical history of DM or pre-DM.       PAST MEDICAL & SURGICAL HISTORY:  HTN (hypertension)        Subjective Information:   -Pt currently sleeping with BiPaP on-seen through window  -As per niece, pt had been feeling sick for about 1 week PTA and likely was not eating as much since he was having frequent BMs  -Reports prior to being sick, pt c good appetite and intake, avoided certain foods since he has gout and hypertension  -Reports pt c NKFA that she knows of  -Niece unsure of wt but did see pt recently and did not feel his wt had changed much; noted dosing wt of about 309 pounds, would continue to monitor    GI Issues:  -Last BM noted on 3/1 per chart     Current Nutrition Order: Diet, DASH/TLC:   Sodium & Cholesterol Restricted (02-27-21 @ 22:29)    PO Intake:   Good (%) [   ]    Fair (50-75%) [   ]    Poor (<50%) [   ]    Skin Integrity: no pressure injuries   Edema: none at this time     Labs:   03-03 Na138 mmol/L Glu 122 mg/dL<H> K+ 4.0 mmol/L Cr  1.28 mg/dL BUN 40 mg/dL<H> Phos n/a   Alb 2.8 g/dL<L> PAB n/a  ; 2/28: A1C 6.2%          Medications:  MEDICATIONS  (STANDING):  acetaminophen  IVPB .. 1000 milliGRAM(s) IV Intermittent once  dexAMETHasone  Injectable 6 milliGRAM(s) IV Push daily  diltiazem    Tablet 30 milliGRAM(s) Oral three times a day  enoxaparin Injectable 140 milliGRAM(s) SubCutaneous every 12 hours  remdesivir  IVPB   IV Intermittent   remdesivir  IVPB 100 milliGRAM(s) IV Intermittent every 24 hours    MEDICATIONS  (PRN):    Admitted Anthropometrics:    Height (cm): 180.3 (02-27-21 @ 12:11)  Weight (kg): 140.6 (02-27-21 @ 12:11)  BMI (kg/m2): 43.3 (02-27-21 @ 12:11)  IBW +/- 10%: 172 pounds     Nutrition Focused Physical Exam: Unable to complete due to limited isolation contact precautions at this time.     Estimated Energy Needs ( 25cal/kg- 30 yves/kg): 1955-2346cal  Estimated Protein Needs ( 1.2Gm/kg- 1.4 Gm/kg): 93-123Gm   Based on weight of: 172 pounds (78.2kg)    [x] Nutrition Diagnosis:   1. inadequate protein energy intake related acute illness as evidenced by reports of suboptimal intake right before admission and likely in house given respiratory status  2. overweight/obesity related to previous excessive energy intake coupled c suboptimal energy expenditure as evidenced by BMI>40  [  ] No active nutrition diagnosis at this time  [  ] Current medical condition precludes nutrition intervention    Goal: Patient will be able to meet at least 75% of estimated nutrient needs     Nutrition Interventions/Recommendations:  1. Continue c current diet. Consider liberalizing diet to regular to allow for more choices when able to eat to improve upon intake.   2. Will continue to provide high protein apple juice and health shakes.   3. RD remains available for further nutritional interventions as needed.     RD to follow-up per protocol.  Ethel Mosqueda MS RD CDN [facility] #[phone] Nutrition Initial Assessment    Nutrition Consult Received: Yes [   ]  No [x]    Reason for Initial Nutrition Assessment: LOS    Source of Information: Unable to conduct a face to face interview due to limited contact restrictions related to pt's medical condition and isolation precautions. Information obtained from the EMR. Spoke c emergency contact, Hilary Garcia, pt's niece over the phone at [phone]; pt currently on BiPaP.     Admitting Diagnosis: 65y Male admitted for Atrial fibrillation with RVR; pt c acute hypoxic respiratory failure, multilobar PNA due to COVID-19. Noted A1C of 6.2% and no past medical history of DM or pre-DM.       PAST MEDICAL & SURGICAL HISTORY:  HTN (hypertension)        Subjective Information:   -Pt currently sleeping with BiPaP on-seen through window; as per PCA, RN was able to feed pt some breakfast this morning but not much  -As per niece, pt had been feeling sick for about 1 week PTA and likely was not eating as much since he was having frequent BMs  -Reports prior to being sick, pt c good appetite and intake, avoided certain foods since he has gout and hypertension  -Reports pt c NKFA that she knows of  -Niece unsure of wt but did see pt recently and did not feel his wt had changed much; noted dosing wt of about 309 pounds, would continue to monitor    GI Issues:  -Last BM noted on 3/1 per chart     Current Nutrition Order: Diet, DASH/TLC:   Sodium & Cholesterol Restricted (02-27-21 @ 22:29)      Skin Integrity: no pressure injuries   Edema: none at this time     Labs:   03-03 Na138 mmol/L Glu 122 mg/dL<H> K+ 4.0 mmol/L Cr  1.28 mg/dL BUN 40 mg/dL<H> Phos n/a   Alb 2.8 g/dL<L> PAB n/a  ; 2/28: A1C 6.2%          Medications:  MEDICATIONS  (STANDING):  acetaminophen  IVPB .. 1000 milliGRAM(s) IV Intermittent once  dexAMETHasone  Injectable 6 milliGRAM(s) IV Push daily  diltiazem    Tablet 30 milliGRAM(s) Oral three times a day  enoxaparin Injectable 140 milliGRAM(s) SubCutaneous every 12 hours  remdesivir  IVPB   IV Intermittent   remdesivir  IVPB 100 milliGRAM(s) IV Intermittent every 24 hours    MEDICATIONS  (PRN):    Admitted Anthropometrics:    Height (cm): 180.3 (02-27-21 @ 12:11)  Weight (kg): 140.6 (02-27-21 @ 12:11)  BMI (kg/m2): 43.3 (02-27-21 @ 12:11)  IBW +/- 10%: 172 pounds     Nutrition Focused Physical Exam: Unable to complete due to limited isolation contact precautions at this time.     Estimated Energy Needs ( 25cal/kg- 30 yves/kg): 1955-2346cal  Estimated Protein Needs ( 1.2Gm/kg- 1.4 Gm/kg): 93-123Gm   Based on weight of: 172 pounds (78.2kg)    [x] Nutrition Diagnosis:   1. inadequate protein energy intake related acute illness as evidenced by reports of suboptimal intake right before admission and likely in house given respiratory status  2. overweight/obesity related to previous excessive energy intake coupled c suboptimal energy expenditure as evidenced by BMI>40  [  ] No active nutrition diagnosis at this time  [  ] Current medical condition precludes nutrition intervention    Goal: Patient will be able to meet at least 75% of estimated nutrient needs     Nutrition Interventions/Recommendations:  1. Continue c current diet. Consider liberalizing diet to regular to allow for more choices when able to eat to improve upon intake.   2. Will continue to provide high protein apple juice and health shakes.   3. RD remains available for further nutritional interventions as needed.     RD to follow-up per protocol.  Ethel Mosqueda MS RD CDN [facility] #[phone]

## 2021-03-03 NOTE — PROGRESS NOTE ADULT - SUBJECTIVE AND OBJECTIVE BOX
Follow-up Pulm Progress Note  Francisco Ramon MD  [phone]    Remains afebrile on Decadron and Remdesevir  Largely on BIPAP, intermittently using NRB at time of meals.  FIO2 100% with O2 sats > 95%  LE dopplers negative    Vital Signs Last 24 Hrs  T(C): 36.7 (02 Mar 2021 04:42), Max: 36.9 (01 Mar 2021 21:41)  T(F): 98 (02 Mar 2021 04:42), Max: 98.5 (01 Mar 2021 21:41)  HR: 85 (02 Mar 2021 09:34) (78 - 98)  BP: 111/82 (02 Mar 2021 05:01) (102/71 - 117/79)  BP(mean): --  RR: 20 (02 Mar 2021 04:42) (18 - 22)  SpO2: 91% (02 Mar 2021 09:34) (84% - 97%)    ABG - ( 01 Mar 2021 21:42 )  pH, Arterial: 7.50  pH, Blood: x     /  pCO2: 32    /  pO2: 69    / HCO3: 25    / Base Excess: 2.4   /  SaO2: 94                              13.8   11.88 )-----------( 268      ( 02 Mar 2021 06:01 )             40.7     03-02    134<L>  |  96  |  34<H>  ----------------------------<  114<H>  3.9   |  27  |  1.29    Ca    8.4      02 Mar 2021 06:01  Phos  3.8     03-02  Mg     3.0     03-02    TPro  6.8  /  Alb  3.0<L>  /  TBili  0.7  /  DBili  0.2  /  AST  153<H>  /  ALT  68<H>  /  AlkPhos  46  03-02    Procalcitonin, Serum: 0.58 ng/mL (02-27-21 @ 13:25)    Serum Pro-Brain Natriuretic Peptide: 277 pg/mL (02-28-21 @ 06:27)      CULTURES:  Culture Results:   No growth to date. (02-27 @ 18:02)  Culture Results:   No growth to date. (02-27 @ 18:02)    Most recent blood culture -- 02-27 @ 18:02   -- -- .Blood Blood 02-27 @ 18:02      Physical Examination:  PULM: No wheeze  CVS: Regular rate and rhythm, no murmurs, rubs, or gallops  ABD: Soft, non-tender  EXT:  No clubbing, cyanosis, or edema    RADIOLOGY REVIEWED  CXR:    CT chest:    PROCEDURE DATE:  02/27/2021        INTERPRETATION:  CLINICAL INFORMATION: Hypoxia, Covid 19 infection, assess for pulmonary embolism    COMPARISON: Chest radiograph from same day    PROCEDURE:  CT Angiography of the Chest.  90 ml of Omnipaque 350 was injected intravenously. 10 ml were discarded.  Sagittal and coronal reformats were performed as well as 3D (MIP) reconstructions.    FINDINGS:    LUNGS AND AIRWAYS: Patent central airways.  Bilateral patchy groundglass and consolidative opacities with peripheral and lower lobe predominance.  PLEURA: No pleural effusion.  MEDIASTINUM AND KASIE: No lymphadenopathy.  VESSELS: No central, main, lobar, or segmental pulmonary embolism. Evaluation of the subsegmental pulmonary arteries is limited/nondiagnostic secondary to respiratory motion.  HEART: Heart size is normal. No pericardial effusion.  CHEST WALL AND LOWER NECK: Prominent right upper paratracheal lymph node may be reactive  VISUALIZED UPPER ABDOMEN: Small hiatal hernia.  BONES: Spinal degenerative changes.    IMPRESSION:  No central, main, lobar, or segmental pulmonary embolism. Evaluation of the subsegmental pulmonary arteries is limited/nondiagnostic secondary to respiratory motion.    Bilateral patchy groundglass and consolidative opacities with peripheral and lower lobe predominance, compatible with patient's known Covid 19 infection. Follow-up to resolution recommended.    TTE:

## 2021-03-03 NOTE — PROGRESS NOTE ADULT - SUBJECTIVE AND OBJECTIVE BOX
Subjective: Patient seen and examined. No new events except as noted.   feels better   remains on NRB     REVIEW OF SYSTEMS:    CONSTITUTIONAL: + weakness, fevers or chills  EYES/ENT: No visual changes;  No vertigo or throat pain   NECK: No pain or stiffness  RESPIRATORY: No cough, wheezing, hemoptysis; No shortness of breath  CARDIOVASCULAR: No chest pain or palpitations  GASTROINTESTINAL: No abdominal or epigastric pain. No nausea, vomiting, or hematemesis; No diarrhea or constipation. No melena or hematochezia.  GENITOURINARY: No dysuria, frequency or hematuria  NEUROLOGICAL: No numbness or weakness  SKIN: No itching, burning, rashes, or lesions   All other review of systems is negative unless indicated above.    MEDICATIONS:  MEDICATIONS  (STANDING):  acetaminophen  IVPB .. 1000 milliGRAM(s) IV Intermittent once  dexAMETHasone  Injectable 6 milliGRAM(s) IV Push daily  diltiazem    Tablet 30 milliGRAM(s) Oral three times a day  enoxaparin Injectable 140 milliGRAM(s) SubCutaneous every 12 hours  remdesivir  IVPB   IV Intermittent   remdesivir  IVPB 100 milliGRAM(s) IV Intermittent every 24 hours      PHYSICAL EXAM:  T(C): 36.6 (03-03-21 @ 05:13), Max: 36.6 (03-02-21 @ 13:52)  HR: 78 (03-03-21 @ 09:26) (72 - 84)  BP: 144/85 (03-03-21 @ 05:13) (129/84 - 144/85)  RR: 20 (03-03-21 @ 05:13) (18 - 20)  SpO2: 95% (03-03-21 @ 09:26) (90% - 97%)  Wt(kg): --  I&O's Summary    02 Mar 2021 07:01  -  03 Mar 2021 07:00  --------------------------------------------------------  IN: 250 mL / OUT: 1150 mL / NET: -900 mL    03 Mar 2021 07:01  -  03 Mar 2021 10:35  --------------------------------------------------------  IN: 0 mL / OUT: 225 mL / NET: -225 mL          Appearance: NAD	  HEENT:   Normal oral mucosa, PERRL, EOMI	  Lymphatic: No lymphadenopathy , no edema  Cardiovascular: Normal S1 S2, No JVD, No murmurs , Peripheral pulses palpable 2+ bilaterally  Respiratory: Decreased bs 	  Gastrointestinal:  Soft, Non-tender, + BS	  Skin: No rashes, No ecchymoses, No cyanosis, warm to touch  Musculoskeletal: Normal range of motion, normal strength  Psychiatry:  Mood & affect appropriate  Ext: No edema      LABS:    CARDIAC MARKERS:                                13.0   11.96 )-----------( 295      ( 03 Mar 2021 06:03 )             39.8     03-03    138  |  99  |  40<H>  ----------------------------<  122<H>  4.0   |  30  |  1.28    Ca    8.5      03 Mar 2021 05:54  Phos  3.8     03-02  Mg     3.0     03-02    TPro  6.6  /  Alb  2.8<L>  /  TBili  0.7  /  DBili  x   /  AST  84<H>  /  ALT  57<H>  /  AlkPhos  44  03-03    proBNP:   Lipid Profile:   HgA1c:   TSH:             TELEMETRY: 	 SR    ECG:  	  RADIOLOGY:   DIAGNOSTIC TESTING:  [ ] Echocardiogram:  [ ]  Catheterization:  [ ] Stress Test:    OTHER:

## 2021-03-04 LAB
ALBUMIN SERPL ELPH-MCNC: 2.9 G/DL — LOW (ref 3.3–5)
ALP SERPL-CCNC: 45 U/L — SIGNIFICANT CHANGE UP (ref 40–120)
ALT FLD-CCNC: 54 U/L — HIGH (ref 10–45)
ANION GAP SERPL CALC-SCNC: 11 MMOL/L — SIGNIFICANT CHANGE UP (ref 5–17)
AST SERPL-CCNC: 63 U/L — HIGH (ref 10–40)
BILIRUB SERPL-MCNC: 0.7 MG/DL — SIGNIFICANT CHANGE UP (ref 0.2–1.2)
BUN SERPL-MCNC: 42 MG/DL — HIGH (ref 7–23)
CALCIUM SERPL-MCNC: 8.7 MG/DL — SIGNIFICANT CHANGE UP (ref 8.4–10.5)
CHLORIDE SERPL-SCNC: 100 MMOL/L — SIGNIFICANT CHANGE UP (ref 96–108)
CO2 SERPL-SCNC: 27 MMOL/L — SIGNIFICANT CHANGE UP (ref 22–31)
CREAT SERPL-MCNC: 1.2 MG/DL — SIGNIFICANT CHANGE UP (ref 0.5–1.3)
CULTURE RESULTS: SIGNIFICANT CHANGE UP
CULTURE RESULTS: SIGNIFICANT CHANGE UP
D DIMER BLD IA.RAPID-MCNC: 213 NG/ML DDU — SIGNIFICANT CHANGE UP
GLUCOSE SERPL-MCNC: 109 MG/DL — HIGH (ref 70–99)
HCT VFR BLD CALC: 39.2 % — SIGNIFICANT CHANGE UP (ref 39–50)
HGB BLD-MCNC: 12.7 G/DL — LOW (ref 13–17)
INR BLD: 1.17 RATIO — HIGH (ref 0.88–1.16)
MCHC RBC-ENTMCNC: 29.1 PG — SIGNIFICANT CHANGE UP (ref 27–34)
MCHC RBC-ENTMCNC: 32.4 GM/DL — SIGNIFICANT CHANGE UP (ref 32–36)
MCV RBC AUTO: 89.9 FL — SIGNIFICANT CHANGE UP (ref 80–100)
NRBC # BLD: 0 /100 WBCS — SIGNIFICANT CHANGE UP (ref 0–0)
PLATELET # BLD AUTO: 335 K/UL — SIGNIFICANT CHANGE UP (ref 150–400)
POTASSIUM SERPL-MCNC: 4 MMOL/L — SIGNIFICANT CHANGE UP (ref 3.5–5.3)
POTASSIUM SERPL-SCNC: 4 MMOL/L — SIGNIFICANT CHANGE UP (ref 3.5–5.3)
PROT SERPL-MCNC: 6.5 G/DL — SIGNIFICANT CHANGE UP (ref 6–8.3)
PROTHROM AB SERPL-ACNC: 13.7 SEC — HIGH (ref 10.6–13.6)
RBC # BLD: 4.36 M/UL — SIGNIFICANT CHANGE UP (ref 4.2–5.8)
RBC # FLD: 13.4 % — SIGNIFICANT CHANGE UP (ref 10.3–14.5)
SODIUM SERPL-SCNC: 138 MMOL/L — SIGNIFICANT CHANGE UP (ref 135–145)
SPECIMEN SOURCE: SIGNIFICANT CHANGE UP
SPECIMEN SOURCE: SIGNIFICANT CHANGE UP
WBC # BLD: 13.71 K/UL — HIGH (ref 3.8–10.5)
WBC # FLD AUTO: 13.71 K/UL — HIGH (ref 3.8–10.5)

## 2021-03-04 RX ADMIN — ENOXAPARIN SODIUM 140 MILLIGRAM(S): 100 INJECTION SUBCUTANEOUS at 05:21

## 2021-03-04 RX ADMIN — REMDESIVIR 500 MILLIGRAM(S): 5 INJECTION INTRAVENOUS at 10:09

## 2021-03-04 RX ADMIN — Medication 30 MILLIGRAM(S): at 05:21

## 2021-03-04 RX ADMIN — Medication 30 MILLIGRAM(S): at 21:44

## 2021-03-04 RX ADMIN — ENOXAPARIN SODIUM 140 MILLIGRAM(S): 100 INJECTION SUBCUTANEOUS at 18:38

## 2021-03-04 RX ADMIN — Medication 6 MILLIGRAM(S): at 05:21

## 2021-03-04 RX ADMIN — Medication 30 MILLIGRAM(S): at 13:00

## 2021-03-04 NOTE — PROGRESS NOTE ADULT - SUBJECTIVE AND OBJECTIVE BOX
Subjective: Patient seen and examined. No new events except as noted.   Siting in chair at bedside on NRB   feels good     REVIEW OF SYSTEMS:    CONSTITUTIONAL: No weakness, fevers or chills  EYES/ENT: No visual changes;  No vertigo or throat pain   NECK: No pain or stiffness  RESPIRATORY: No cough, wheezing, hemoptysis; No shortness of breath  CARDIOVASCULAR: No chest pain or palpitations  GASTROINTESTINAL: No abdominal or epigastric pain. No nausea, vomiting, or hematemesis; No diarrhea or constipation. No melena or hematochezia.  GENITOURINARY: No dysuria, frequency or hematuria  NEUROLOGICAL: No numbness or weakness  SKIN: No itching, burning, rashes, or lesions   All other review of systems is negative unless indicated above.    MEDICATIONS:  MEDICATIONS  (STANDING):  acetaminophen  IVPB .. 1000 milliGRAM(s) IV Intermittent once  dexAMETHasone  Injectable 6 milliGRAM(s) IV Push daily  diltiazem    Tablet 30 milliGRAM(s) Oral three times a day  enoxaparin Injectable 140 milliGRAM(s) SubCutaneous every 12 hours      PHYSICAL EXAM:  T(C): 36.8 (03-04-21 @ 04:00), Max: 36.8 (03-04-21 @ 04:00)  HR: 82 (03-04-21 @ 09:38) (73 - 85)  BP: 124/83 (03-04-21 @ 04:00) (116/75 - 129/84)  RR: 20 (03-04-21 @ 10:30) (18 - 20)  SpO2: 97% (03-04-21 @ 10:30) (93% - 98%)  Wt(kg): --  I&O's Summary    03 Mar 2021 07:01  -  04 Mar 2021 07:00  --------------------------------------------------------  IN: 490 mL / OUT: 1075 mL / NET: -585 mL          Appearance: NAD NRB   HEENT:   Normal oral mucosa, PERRL, EOMI	  Lymphatic: No lymphadenopathy , no edema  Cardiovascular: Normal S1 S2, No JVD, No murmurs , Peripheral pulses palpable 2+ bilaterally  Respiratory: Decreased bs   Gastrointestinal:  Soft, Non-tender, + BS	  Skin: No rashes, No ecchymoses, No cyanosis, warm to touch  Musculoskeletal: Normal range of motion, normal strength  Psychiatry:  Mood & affect appropriate  Ext: No edema      LABS:    CARDIAC MARKERS:    D-Dimer Assay, Quantitative: 213 ng/mL DDU (03.04.21 @ 05:40)                             12.7   13.71 )-----------( 335      ( 04 Mar 2021 05:40 )             39.2     03-04    138  |  100  |  42<H>  ----------------------------<  109<H>  4.0   |  27  |  1.20    Ca    8.7      04 Mar 2021 05:40    TPro  6.5  /  Alb  2.9<L>  /  TBili  0.7  /  DBili  x   /  AST  63<H>  /  ALT  54<H>  /  AlkPhos  45  03-04    proBNP:   Lipid Profile:   HgA1c:   TSH:             TELEMETRY: 	  SR   ECG:  	  RADIOLOGY:   DIAGNOSTIC TESTING:  [ ] Echocardiogram:  [ ]  Catheterization:  [ ] Stress Test:    OTHER:

## 2021-03-04 NOTE — PROGRESS NOTE ADULT - SUBJECTIVE AND OBJECTIVE BOX
Follow-up Pulm Progress Note  Francisco Ramon MD  [phone]    Improving respiratory status: Today OOB in chair, comfortable on NRB with sats > 95%  D-dimer 213      Vital Signs Last 24 Hrs  T(C): 36.7 (04 Mar 2021 11:37), Max: 36.8 (04 Mar 2021 04:00)  T(F): 98.1 (04 Mar 2021 11:37), Max: 98.2 (04 Mar 2021 04:00)  HR: 81 (04 Mar 2021 12:50) (73 - 86)  BP: 131/78 (04 Mar 2021 12:50) (116/75 - 143/89)  BP(mean): --  RR: 20 (04 Mar 2021 12:50) (20 - 20)  SpO2: 96% (04 Mar 2021 12:50) (93% - 99%)                        12.7   13.71 )-----------( 335      ( 04 Mar 2021 05:40 )             39.2     03-04    138  |  100  |  42<H>  ----------------------------<  109<H>  4.0   |  27  |  1.20    Ca    8.7      04 Mar 2021 05:40    TPro  6.5  /  Alb  2.9<L>  /  TBili  0.7  /  DBili  x   /  AST  63<H>  /  ALT  54<H>  /  AlkPhos  45  03-04      CULTURES:  Culture Results:   No growth to date. (02-27 @ 18:02)  Culture Results:   No growth to date. (02-27 @ 18:02)    Most recent blood culture -- 02-27 @ 18:02   -- -- .Blood Blood 02-27 @ 18:02      Physical Examination:  PULM: No wheeze  CVS: Regular rate and rhythm, no murmurs, rubs, or gallops  ABD: Soft, non-tender  EXT:  No clubbing, cyanosis, or edema    RADIOLOGY REVIEWED  CXR:    CT chest:    PROCEDURE DATE:  02/27/2021        INTERPRETATION:  CLINICAL INFORMATION: Hypoxia, Covid 19 infection, assess for pulmonary embolism    COMPARISON: Chest radiograph from same day    PROCEDURE:  CT Angiography of the Chest.  90 ml of Omnipaque 350 was injected intravenously. 10 ml were discarded.  Sagittal and coronal reformats were performed as well as 3D (MIP) reconstructions.    FINDINGS:    LUNGS AND AIRWAYS: Patent central airways.  Bilateral patchy groundglass and consolidative opacities with peripheral and lower lobe predominance.  PLEURA: No pleural effusion.  MEDIASTINUM AND KASIE: No lymphadenopathy.  VESSELS: No central, main, lobar, or segmental pulmonary embolism. Evaluation of the subsegmental pulmonary arteries is limited/nondiagnostic secondary to respiratory motion.  HEART: Heart size is normal. No pericardial effusion.  CHEST WALL AND LOWER NECK: Prominent right upper paratracheal lymph node may be reactive  VISUALIZED UPPER ABDOMEN: Small hiatal hernia.  BONES: Spinal degenerative changes.    IMPRESSION:  No central, main, lobar, or segmental pulmonary embolism. Evaluation of the subsegmental pulmonary arteries is limited/nondiagnostic secondary to respiratory motion.    Bilateral patchy groundglass and consolidative opacities with peripheral and lower lobe predominance, compatible with patient's known Covid 19 infection. Follow-up to resolution recommended.    TTE:

## 2021-03-04 NOTE — PROGRESS NOTE ADULT - ASSESSMENT
ACute hypoxemic respiratory failure  Obesity  Multilobar viral pneumonia due to COVID 19  Atrial Fibrillation: now back in NSR    REC    Trial high flow nasal cannula again: initial sat 80% with 50 l/min flow  Target O2 sat 88-90%  LE dopplers negtive DVT; admission CTA negative PE  Full dose AC only for AF - otherwise would reduce to 70 q 12 (mid dose)

## 2021-03-04 NOTE — PROGRESS NOTE ADULT - SUBJECTIVE AND OBJECTIVE BOX
Patient is a 65y old  Male who presents with a chief complaint of acute hypoxemic respiratory failure (04 Mar 2021 13:06)      SUBJECTIVE / OVERNIGHT EVENTS: back on NRB    MEDICATIONS  (STANDING):  acetaminophen  IVPB .. 1000 milliGRAM(s) IV Intermittent once  dexAMETHasone  Injectable 6 milliGRAM(s) IV Push daily  diltiazem    Tablet 30 milliGRAM(s) Oral three times a day  enoxaparin Injectable 140 milliGRAM(s) SubCutaneous every 12 hours    MEDICATIONS  (PRN):      Vital Signs Last 24 Hrs  T(F): 97.9 (03-04-21 @ 19:58), Max: 98.2 (03-04-21 @ 04:00)  HR: 79 (03-04-21 @ 19:58) (67 - 86)  BP: 133/81 (03-04-21 @ 19:58) (124/83 - 143/89)  RR: 20 (03-04-21 @ 19:58) (20 - 20)  SpO2: 92% (03-04-21 @ 19:58) (92% - 99%)  Telemetry:   CAPILLARY BLOOD GLUCOSE        I&O's Summary    03 Mar 2021 07:01  -  04 Mar 2021 07:00  --------------------------------------------------------  IN: 490 mL / OUT: 1075 mL / NET: -585 mL    04 Mar 2021 07:01  -  04 Mar 2021 21:04  --------------------------------------------------------  IN: 300 mL / OUT: 750 mL / NET: -450 mL        PHYSICAL EXAM:  GENERAL: NAD, well-developed  HEAD:  Atraumatic, Normocephalic  EYES: EOMI, PERRLA, conjunctiva and sclera clear  NECK: Supple, No JVD  CHEST/LUNG: Clear to auscultation bilaterally; No wheeze  HEART: Regular rate and rhythm; No murmurs, rubs, or gallops  ABDOMEN: Soft, Nontender, Nondistended; Bowel sounds present  EXTREMITIES:  2+ Peripheral Pulses, No clubbing, cyanosis, or edema  PSYCH: AAOx3  NEUROLOGY: non-focal  SKIN: No rashes or lesions    LABS:                        12.7   13.71 )-----------( 335      ( 04 Mar 2021 05:40 )             39.2     03-04    138  |  100  |  42<H>  ----------------------------<  109<H>  4.0   |  27  |  1.20    Ca    8.7      04 Mar 2021 05:40    TPro  6.5  /  Alb  2.9<L>  /  TBili  0.7  /  DBili  x   /  AST  63<H>  /  ALT  54<H>  /  AlkPhos  45  03-04    PT/INR - ( 04 Mar 2021 08:37 )   PT: 13.7 sec;   INR: 1.17 ratio                   RADIOLOGY & ADDITIONAL TESTS:    Imaging Personally Reviewed:    Consultant(s) Notes Reviewed:      Care Discussed with Consultants/Other Providers:

## 2021-03-05 LAB
ALBUMIN SERPL ELPH-MCNC: 2.9 G/DL — LOW (ref 3.3–5)
ALP SERPL-CCNC: 45 U/L — SIGNIFICANT CHANGE UP (ref 40–120)
ALT FLD-CCNC: 50 U/L — HIGH (ref 10–45)
ANION GAP SERPL CALC-SCNC: 9 MMOL/L — SIGNIFICANT CHANGE UP (ref 5–17)
AST SERPL-CCNC: 47 U/L — HIGH (ref 10–40)
BILIRUB SERPL-MCNC: 0.6 MG/DL — SIGNIFICANT CHANGE UP (ref 0.2–1.2)
BUN SERPL-MCNC: 39 MG/DL — HIGH (ref 7–23)
CALCIUM SERPL-MCNC: 8.3 MG/DL — LOW (ref 8.4–10.5)
CHLORIDE SERPL-SCNC: 101 MMOL/L — SIGNIFICANT CHANGE UP (ref 96–108)
CO2 SERPL-SCNC: 28 MMOL/L — SIGNIFICANT CHANGE UP (ref 22–31)
CREAT SERPL-MCNC: 1.02 MG/DL — SIGNIFICANT CHANGE UP (ref 0.5–1.3)
GLUCOSE SERPL-MCNC: 101 MG/DL — HIGH (ref 70–99)
HCT VFR BLD CALC: 40.5 % — SIGNIFICANT CHANGE UP (ref 39–50)
HGB BLD-MCNC: 12.9 G/DL — LOW (ref 13–17)
INR BLD: 1.12 RATIO — SIGNIFICANT CHANGE UP (ref 0.88–1.16)
MCHC RBC-ENTMCNC: 29 PG — SIGNIFICANT CHANGE UP (ref 27–34)
MCHC RBC-ENTMCNC: 31.9 GM/DL — LOW (ref 32–36)
MCV RBC AUTO: 91 FL — SIGNIFICANT CHANGE UP (ref 80–100)
NRBC # BLD: 0 /100 WBCS — SIGNIFICANT CHANGE UP (ref 0–0)
PLATELET # BLD AUTO: 353 K/UL — SIGNIFICANT CHANGE UP (ref 150–400)
POTASSIUM SERPL-MCNC: 4 MMOL/L — SIGNIFICANT CHANGE UP (ref 3.5–5.3)
POTASSIUM SERPL-SCNC: 4 MMOL/L — SIGNIFICANT CHANGE UP (ref 3.5–5.3)
PROT SERPL-MCNC: 6.5 G/DL — SIGNIFICANT CHANGE UP (ref 6–8.3)
PROTHROM AB SERPL-ACNC: 13.2 SEC — SIGNIFICANT CHANGE UP (ref 10.6–13.6)
RBC # BLD: 4.45 M/UL — SIGNIFICANT CHANGE UP (ref 4.2–5.8)
RBC # FLD: 13.4 % — SIGNIFICANT CHANGE UP (ref 10.3–14.5)
SODIUM SERPL-SCNC: 138 MMOL/L — SIGNIFICANT CHANGE UP (ref 135–145)
WBC # BLD: 12.05 K/UL — HIGH (ref 3.8–10.5)
WBC # FLD AUTO: 12.05 K/UL — HIGH (ref 3.8–10.5)

## 2021-03-05 RX ADMIN — Medication 30 MILLIGRAM(S): at 13:58

## 2021-03-05 RX ADMIN — ENOXAPARIN SODIUM 140 MILLIGRAM(S): 100 INJECTION SUBCUTANEOUS at 06:14

## 2021-03-05 RX ADMIN — Medication 6 MILLIGRAM(S): at 06:13

## 2021-03-05 RX ADMIN — Medication 30 MILLIGRAM(S): at 06:14

## 2021-03-05 RX ADMIN — ENOXAPARIN SODIUM 140 MILLIGRAM(S): 100 INJECTION SUBCUTANEOUS at 18:31

## 2021-03-05 RX ADMIN — Medication 30 MILLIGRAM(S): at 22:24

## 2021-03-05 NOTE — PROGRESS NOTE ADULT - SUBJECTIVE AND OBJECTIVE BOX
Subjective: Patient seen and examined. No new events except as noted.   remains on NR   feel yeny   sitting in chair at bedside     REVIEW OF SYSTEMS:    CONSTITUTIONAL: + weakness, fevers or chills  EYES/ENT: No visual changes;  No vertigo or throat pain   NECK: No pain or stiffness  RESPIRATORY: No cough, wheezing, hemoptysis; No shortness of breath  CARDIOVASCULAR: No chest pain or palpitations  GASTROINTESTINAL: No abdominal or epigastric pain. No nausea, vomiting, or hematemesis; No diarrhea or constipation. No melena or hematochezia.  GENITOURINARY: No dysuria, frequency or hematuria  NEUROLOGICAL: No numbness or weakness  SKIN: No itching, burning, rashes, or lesions   All other review of systems is negative unless indicated above.    MEDICATIONS:  MEDICATIONS  (STANDING):  acetaminophen  IVPB .. 1000 milliGRAM(s) IV Intermittent once  dexAMETHasone  Injectable 6 milliGRAM(s) IV Push daily  diltiazem    Tablet 30 milliGRAM(s) Oral three times a day  enoxaparin Injectable 140 milliGRAM(s) SubCutaneous every 12 hours      PHYSICAL EXAM:  T(C): 37 (03-05-21 @ 04:16), Max: 37 (03-05-21 @ 04:16)  HR: 86 (03-05-21 @ 10:13) (67 - 86)  BP: 138/93 (03-05-21 @ 04:16) (131/78 - 138/93)  RR: 18 (03-05-21 @ 10:13) (18 - 20)  SpO2: 91% (03-05-21 @ 10:13) (91% - 98%)  Wt(kg): --  I&O's Summary    04 Mar 2021 07:01  -  05 Mar 2021 07:00  --------------------------------------------------------  IN: 520 mL / OUT: 1050 mL / NET: -530 mL          Appearance: Normal	  HEENT:   Normal oral mucosa, PERRL, EOMI	  Lymphatic: No lymphadenopathy , no edema  Cardiovascular: Normal S1 S2, No JVD, No murmurs , Peripheral pulses palpable 2+ bilaterally  Respiratory: Decreased bs 	  Gastrointestinal:  Soft, Non-tender, + BS	  Skin: No rashes, No ecchymoses, No cyanosis, warm to touch  Musculoskeletal: Normal range of motion, normal strength  Psychiatry:  Mood & affect appropriate  Ext: No edema      LABS:    CARDIAC MARKERS:                                12.9   12.05 )-----------( 353      ( 05 Mar 2021 06:23 )             40.5     03-05    138  |  101  |  39<H>  ----------------------------<  101<H>  4.0   |  28  |  1.02    Ca    8.3<L>      05 Mar 2021 06:23    TPro  6.5  /  Alb  2.9<L>  /  TBili  0.6  /  DBili  x   /  AST  47<H>  /  ALT  50<H>  /  AlkPhos  45  03-05    proBNP:   Lipid Profile:   HgA1c:   TSH:             TELEMETRY: 	SR     ECG:  	  RADIOLOGY:   DIAGNOSTIC TESTING:  [ ] Echocardiogram:  [ ]  Catheterization:  [ ] Stress Test:    OTHER:

## 2021-03-05 NOTE — PROGRESS NOTE ADULT - SUBJECTIVE AND OBJECTIVE BOX
Follow-up Pulm Progress Note  Francisco Ramon MD  [phone]    Afebrile with stable respiratory status, though high FIO2 requirement  Refusing HiFlo cannula: currently OOB in chair on NRB with O2 sat 91% at time of visit  Using Nocturnal BIPAP  Remains on full dose Lovenox  LE dopplers negative DVT      Vital Signs Last 24 Hrs  T(C): 36.7 (04 Mar 2021 11:37), Max: 36.8 (04 Mar 2021 04:00)  T(F): 98.1 (04 Mar 2021 11:37), Max: 98.2 (04 Mar 2021 04:00)  HR: 81 (04 Mar 2021 12:50) (73 - 86)  BP: 131/78 (04 Mar 2021 12:50) (116/75 - 143/89)  BP(mean): --  RR: 20 (04 Mar 2021 12:50) (20 - 20)  SpO2: 96% (04 Mar 2021 12:50) (93% - 99%)                        12.7   13.71 )-----------( 335      ( 04 Mar 2021 05:40 )             39.2     03-04    138  |  100  |  42<H>  ----------------------------<  109<H>  4.0   |  27  |  1.20    Ca    8.7      04 Mar 2021 05:40    TPro  6.5  /  Alb  2.9<L>  /  TBili  0.7  /  DBili  x   /  AST  63<H>  /  ALT  54<H>  /  AlkPhos  45  03-04      CULTURES:  Culture Results:   No growth to date. (02-27 @ 18:02)  Culture Results:   No growth to date. (02-27 @ 18:02)    Most recent blood culture -- 02-27 @ 18:02   -- -- .Blood Blood 02-27 @ 18:02      Physical Examination:  PULM: No wheeze  CVS: Regular rate and rhythm, no murmurs, rubs, or gallops  ABD: Soft, non-tender  EXT:  No clubbing, cyanosis, or edema    RADIOLOGY REVIEWED  CXR:    CT chest:    PROCEDURE DATE:  02/27/2021        INTERPRETATION:  CLINICAL INFORMATION: Hypoxia, Covid 19 infection, assess for pulmonary embolism    COMPARISON: Chest radiograph from same day    PROCEDURE:  CT Angiography of the Chest.  90 ml of Omnipaque 350 was injected intravenously. 10 ml were discarded.  Sagittal and coronal reformats were performed as well as 3D (MIP) reconstructions.    FINDINGS:    LUNGS AND AIRWAYS: Patent central airways.  Bilateral patchy groundglass and consolidative opacities with peripheral and lower lobe predominance.  PLEURA: No pleural effusion.  MEDIASTINUM AND KASIE: No lymphadenopathy.  VESSELS: No central, main, lobar, or segmental pulmonary embolism. Evaluation of the subsegmental pulmonary arteries is limited/nondiagnostic secondary to respiratory motion.  HEART: Heart size is normal. No pericardial effusion.  CHEST WALL AND LOWER NECK: Prominent right upper paratracheal lymph node may be reactive  VISUALIZED UPPER ABDOMEN: Small hiatal hernia.  BONES: Spinal degenerative changes.    IMPRESSION:  No central, main, lobar, or segmental pulmonary embolism. Evaluation of the subsegmental pulmonary arteries is limited/nondiagnostic secondary to respiratory motion.    Bilateral patchy groundglass and consolidative opacities with peripheral and lower lobe predominance, compatible with patient's known Covid 19 infection. Follow-up to resolution recommended.    TTE:

## 2021-03-05 NOTE — PROGRESS NOTE ADULT - ASSESSMENT
ACute hypoxemic respiratory failure  Obesity  Multilobar viral pneumonia due to COVID 19  Atrial Fibrillation: now back in NSR    REC    Trial high flow nasal cannula again: initial sat 80% with 50 l/min flow  Target O2 sat 88-90%  LE dopplers negtive DVT; admission CTA negative PE

## 2021-03-05 NOTE — PROGRESS NOTE ADULT - SUBJECTIVE AND OBJECTIVE BOX
Patient is a 65y old  Male who presents with a chief complaint of acute hypoxemic respiratory failure (04 Mar 2021 21:04)      SUBJECTIVE / OVERNIGHT EVENTS: ptn is on BIPAP    MEDICATIONS  (STANDING):  acetaminophen  IVPB .. 1000 milliGRAM(s) IV Intermittent once  dexAMETHasone  Injectable 6 milliGRAM(s) IV Push daily  diltiazem    Tablet 30 milliGRAM(s) Oral three times a day  enoxaparin Injectable 140 milliGRAM(s) SubCutaneous every 12 hours    MEDICATIONS  (PRN):      Vital Signs Last 24 Hrs  T(F): 98.6 (03-05-21 @ 04:16), Max: 98.6 (03-05-21 @ 04:16)  HR: 79 (03-05-21 @ 06:03) (67 - 86)  BP: 138/93 (03-05-21 @ 04:16) (131/78 - 143/89)  RR: 20 (03-05-21 @ 04:16) (20 - 20)  SpO2: 91% (03-05-21 @ 06:03) (91% - 99%)  Telemetry:   CAPILLARY BLOOD GLUCOSE        I&O's Summary    04 Mar 2021 07:01  -  05 Mar 2021 07:00  --------------------------------------------------------  IN: 520 mL / OUT: 1050 mL / NET: -530 mL        PHYSICAL EXAM:  GENERAL: NAD, well-developed  HEAD:  Atraumatic, Normocephalic  EYES: EOMI, PERRLA, conjunctiva and sclera clear  NECK: Supple, No JVD  CHEST/LUNG: Clear to auscultation bilaterally; No wheeze  HEART: Regular rate and rhythm; No murmurs, rubs, or gallops  ABDOMEN: Soft, Nontender, Nondistended; Bowel sounds present  EXTREMITIES:  2+ Peripheral Pulses, No clubbing, cyanosis, or edema  PSYCH: AAOx3  NEUROLOGY: non-focal  SKIN: No rashes or lesions    LABS:                        12.9   12.05 )-----------( 353      ( 05 Mar 2021 06:23 )             40.5     03-05    138  |  101  |  39<H>  ----------------------------<  101<H>  4.0   |  28  |  1.02    Ca    8.3<L>      05 Mar 2021 06:23    TPro  6.5  /  Alb  2.9<L>  /  TBili  0.6  /  DBili  x   /  AST  47<H>  /  ALT  50<H>  /  AlkPhos  45  03-05    PT/INR - ( 05 Mar 2021 08:34 )   PT: 13.2 sec;   INR: 1.12 ratio                   RADIOLOGY & ADDITIONAL TESTS:    Imaging Personally Reviewed:    Consultant(s) Notes Reviewed:      Care Discussed with Consultants/Other Providers:

## 2021-03-05 NOTE — PROGRESS NOTE ADULT - PROBLEM SELECTOR PLAN 2
Completed Remdesivir   cont decadron   Supplemental oxygen. transition to Nasal high flow    Lovenox BID

## 2021-03-06 LAB
ALBUMIN SERPL ELPH-MCNC: 2.8 G/DL — LOW (ref 3.3–5)
ALP SERPL-CCNC: 44 U/L — SIGNIFICANT CHANGE UP (ref 40–120)
ALT FLD-CCNC: 48 U/L — HIGH (ref 10–45)
ANION GAP SERPL CALC-SCNC: 11 MMOL/L — SIGNIFICANT CHANGE UP (ref 5–17)
AST SERPL-CCNC: 43 U/L — HIGH (ref 10–40)
BILIRUB SERPL-MCNC: 0.6 MG/DL — SIGNIFICANT CHANGE UP (ref 0.2–1.2)
BUN SERPL-MCNC: 38 MG/DL — HIGH (ref 7–23)
CALCIUM SERPL-MCNC: 8.8 MG/DL — SIGNIFICANT CHANGE UP (ref 8.4–10.5)
CHLORIDE SERPL-SCNC: 103 MMOL/L — SIGNIFICANT CHANGE UP (ref 96–108)
CO2 SERPL-SCNC: 28 MMOL/L — SIGNIFICANT CHANGE UP (ref 22–31)
CREAT SERPL-MCNC: 0.97 MG/DL — SIGNIFICANT CHANGE UP (ref 0.5–1.3)
GLUCOSE SERPL-MCNC: 82 MG/DL — SIGNIFICANT CHANGE UP (ref 70–99)
HCT VFR BLD CALC: 40.7 % — SIGNIFICANT CHANGE UP (ref 39–50)
HGB BLD-MCNC: 12.8 G/DL — LOW (ref 13–17)
INR BLD: 1.21 RATIO — HIGH (ref 0.88–1.16)
MCHC RBC-ENTMCNC: 28.9 PG — SIGNIFICANT CHANGE UP (ref 27–34)
MCHC RBC-ENTMCNC: 31.4 GM/DL — LOW (ref 32–36)
MCV RBC AUTO: 91.9 FL — SIGNIFICANT CHANGE UP (ref 80–100)
NRBC # BLD: 0 /100 WBCS — SIGNIFICANT CHANGE UP (ref 0–0)
PLATELET # BLD AUTO: 378 K/UL — SIGNIFICANT CHANGE UP (ref 150–400)
POTASSIUM SERPL-MCNC: 4.6 MMOL/L — SIGNIFICANT CHANGE UP (ref 3.5–5.3)
POTASSIUM SERPL-SCNC: 4.6 MMOL/L — SIGNIFICANT CHANGE UP (ref 3.5–5.3)
PROT SERPL-MCNC: 6.5 G/DL — SIGNIFICANT CHANGE UP (ref 6–8.3)
PROTHROM AB SERPL-ACNC: 14.3 SEC — HIGH (ref 10.6–13.6)
RBC # BLD: 4.43 M/UL — SIGNIFICANT CHANGE UP (ref 4.2–5.8)
RBC # FLD: 13.4 % — SIGNIFICANT CHANGE UP (ref 10.3–14.5)
SODIUM SERPL-SCNC: 142 MMOL/L — SIGNIFICANT CHANGE UP (ref 135–145)
WBC # BLD: 9.21 K/UL — SIGNIFICANT CHANGE UP (ref 3.8–10.5)
WBC # FLD AUTO: 9.21 K/UL — SIGNIFICANT CHANGE UP (ref 3.8–10.5)

## 2021-03-06 RX ADMIN — ENOXAPARIN SODIUM 140 MILLIGRAM(S): 100 INJECTION SUBCUTANEOUS at 05:35

## 2021-03-06 RX ADMIN — ENOXAPARIN SODIUM 140 MILLIGRAM(S): 100 INJECTION SUBCUTANEOUS at 18:32

## 2021-03-06 RX ADMIN — Medication 30 MILLIGRAM(S): at 05:35

## 2021-03-06 RX ADMIN — Medication 30 MILLIGRAM(S): at 22:22

## 2021-03-06 RX ADMIN — Medication 30 MILLIGRAM(S): at 13:54

## 2021-03-06 RX ADMIN — Medication 6 MILLIGRAM(S): at 05:34

## 2021-03-06 NOTE — PROGRESS NOTE ADULT - SUBJECTIVE AND OBJECTIVE BOX
Follow-up Pulm Progress Note    Stable on high FiO2.     Medications:  MEDICATIONS  (STANDING):  acetaminophen  IVPB .. 1000 milliGRAM(s) IV Intermittent once  dexAMETHasone  Injectable 6 milliGRAM(s) IV Push daily  diltiazem    Tablet 30 milliGRAM(s) Oral three times a day  enoxaparin Injectable 140 milliGRAM(s) SubCutaneous every 12 hours    MEDICATIONS  (PRN):    Vital Signs Last 24 Hrs  T(C): 36.6 (06 Mar 2021 11:12), Max: 36.6 (06 Mar 2021 11:12)  T(F): 97.9 (06 Mar 2021 11:12), Max: 97.9 (06 Mar 2021 11:12)  HR: 72 (06 Mar 2021 11:12) (71 - 80)  BP: 133/80 (06 Mar 2021 11:12) (121/85 - 144/80)  BP(mean): --  RR: 19 (06 Mar 2021 11:12) (19 - 20)  SpO2: 96% (06 Mar 2021 11:12) (91% - 97%)    03-05 @ 07:01  -  03-06 @ 07:00  --------------------------------------------------------  IN: 720 mL / OUT: 1720 mL / NET: -1000 mL      LABS:                        12.8   9.21  )-----------( 378      ( 06 Mar 2021 06:51 )             40.7     03-06    142  |  103  |  38<H>  ----------------------------<  82  4.6   |  28  |  0.97    Ca    8.8      06 Mar 2021 06:45    TPro  6.5  /  Alb  2.8<L>  /  TBili  0.6  /  DBili  x   /  AST  43<H>  /  ALT  48<H>  /  AlkPhos  44  03-06    CAPILLARY BLOOD GLUCOSE        PT/INR - ( 06 Mar 2021 09:10 )   PT: 14.3 sec;   INR: 1.21 ratio      CULTURES: (if applicable)    Culture - Blood (collected 02-27-21 @ 18:02)  Source: .Blood Blood  Final Report (03-04-21 @ 19:00):    No Growth Final    Culture - Blood (collected 02-27-21 @ 18:02)  Source: .Blood Blood  Final Report (03-04-21 @ 19:00):    No Growth Final    Physical Examination:  PULM: Decreased BS at bases  CVS: S1, S2 heard    RADIOLOGY REVIEWED  CXR:     CT chest:    TTE:

## 2021-03-06 NOTE — PROGRESS NOTE ADULT - ASSESSMENT
The patient is a 65y Male, hx of morbid obesity, HTN on lisinopril, complaining of shortness of breath. covid + dx 5d prior. no home O2 requirement, former smoker. has dyspnea at rest and on exertion. has not taken steroids, no hospitalization for covid. denies CP. [facility]. (27 Feb 2021 22:29)    ER vital:  T 98.4, P 178, /85.  Pt satting 85% on RA, placed on nasal cannula, advanced to NRB.  Covid pcr (+), covid serologies (-).   CT angio chest (-) for PE, (+) b/l patchy opacities.  Pt with elevated AST (>5x ULN but <10x) and ALT.   Pt started on remdesivir and dexamethasone.     Acute covid illness:    - Cont remdesivir and dexamethasone.  Monitor daily LFTs and renal function while pt on remdesivir.  LFTs elevated, possibly secondary to viral infection.  Currently ALT < 10x ULN, can cont remdesivir.  LFTs slowly improving.     - Monitor pulse ox, pt on supplemental O2 - now on Bipap    - Monitor inflammatory markers q72    DD:  213 <-- 282 <-- 343  ferritin: 1468 <-- 1636  pct: 0.58  crp: 10.3 <-- 10.9    - Pt on full dose lovenox for dvt/pe.  CT angio chest (-) for PE.   LE dopplers neg DVT    * Switched to NRB mask, saturating 96%, NAD    Will follow,    Wandy Quinonesi  [phone]

## 2021-03-06 NOTE — PROGRESS NOTE ADULT - SUBJECTIVE AND OBJECTIVE BOX
Patient is a 65y old  Male who presents with a chief complaint of acute hypoxemic respiratory failure (06 Mar 2021 17:36)      SUBJECTIVE / OVERNIGHT EVENTS: remains on NRB    MEDICATIONS  (STANDING):  acetaminophen  IVPB .. 1000 milliGRAM(s) IV Intermittent once  dexAMETHasone  Injectable 6 milliGRAM(s) IV Push daily  diltiazem    Tablet 30 milliGRAM(s) Oral three times a day  enoxaparin Injectable 140 milliGRAM(s) SubCutaneous every 12 hours    MEDICATIONS  (PRN):      Vital Signs Last 24 Hrs  T(F): 97.9 (03-06-21 @ 11:12), Max: 97.9 (03-06-21 @ 11:12)  HR: 72 (03-06-21 @ 11:12) (71 - 80)  BP: 133/80 (03-06-21 @ 11:12) (121/85 - 144/80)  RR: 19 (03-06-21 @ 11:12) (19 - 20)  SpO2: 96% (03-06-21 @ 11:12) (91% - 97%)  Telemetry:   CAPILLARY BLOOD GLUCOSE        I&O's Summary    05 Mar 2021 07:01  -  06 Mar 2021 07:00  --------------------------------------------------------  IN: 720 mL / OUT: 1720 mL / NET: -1000 mL    06 Mar 2021 07:01  -  06 Mar 2021 17:45  --------------------------------------------------------  IN: 720 mL / OUT: 250 mL / NET: 470 mL        PHYSICAL EXAM:  GENERAL: NAD, well-developed  HEAD:  Atraumatic, Normocephalic  EYES: EOMI, PERRLA, conjunctiva and sclera clear  NECK: Supple, No JVD  CHEST/LUNG: Clear to auscultation bilaterally; No wheeze  HEART: Regular rate and rhythm; No murmurs, rubs, or gallops  ABDOMEN: Soft, Nontender, Nondistended; Bowel sounds present  EXTREMITIES:  2+ Peripheral Pulses, No clubbing, cyanosis, or edema  PSYCH: AAOx3  NEUROLOGY: non-focal  SKIN: No rashes or lesions    LABS:                        12.8   9.21  )-----------( 378      ( 06 Mar 2021 06:51 )             40.7     03-06    142  |  103  |  38<H>  ----------------------------<  82  4.6   |  28  |  0.97    Ca    8.8      06 Mar 2021 06:45    TPro  6.5  /  Alb  2.8<L>  /  TBili  0.6  /  DBili  x   /  AST  43<H>  /  ALT  48<H>  /  AlkPhos  44  03-06    PT/INR - ( 06 Mar 2021 09:10 )   PT: 14.3 sec;   INR: 1.21 ratio                   RADIOLOGY & ADDITIONAL TESTS:    Imaging Personally Reviewed:    Consultant(s) Notes Reviewed:      Care Discussed with Consultants/Other Providers:

## 2021-03-06 NOTE — PROGRESS NOTE ADULT - SUBJECTIVE AND OBJECTIVE BOX
Subjective: Patient seen and examined. No new events except as noted.   Feels ok   On NRB     REVIEW OF SYSTEMS:    CONSTITUTIONAL: No weakness, fevers or chills  EYES/ENT: No visual changes;  No vertigo or throat pain   NECK: No pain or stiffness  RESPIRATORY: No cough, wheezing, hemoptysis; No shortness of breath  CARDIOVASCULAR: No chest pain or palpitations  GASTROINTESTINAL: No abdominal or epigastric pain. No nausea, vomiting, or hematemesis; No diarrhea or constipation. No melena or hematochezia.  GENITOURINARY: No dysuria, frequency or hematuria  NEUROLOGICAL: No numbness or weakness  SKIN: No itching, burning, rashes, or lesions   All other review of systems is negative unless indicated above.    MEDICATIONS:  MEDICATIONS  (STANDING):  acetaminophen  IVPB .. 1000 milliGRAM(s) IV Intermittent once  dexAMETHasone  Injectable 6 milliGRAM(s) IV Push daily  diltiazem    Tablet 30 milliGRAM(s) Oral three times a day  enoxaparin Injectable 140 milliGRAM(s) SubCutaneous every 12 hours      PHYSICAL EXAM:  T(C): 36.6 (03-06-21 @ 11:12), Max: 36.6 (03-06-21 @ 11:12)  HR: 81 (03-06-21 @ 18:48) (71 - 81)  BP: 133/80 (03-06-21 @ 11:12) (121/85 - 144/80)  RR: 20 (03-06-21 @ 18:48) (19 - 20)  SpO2: 95% (03-06-21 @ 18:48) (91% - 97%)  Wt(kg): --  I&O's Summary    05 Mar 2021 07:01  -  06 Mar 2021 07:00  --------------------------------------------------------  IN: 720 mL / OUT: 1720 mL / NET: -1000 mL    06 Mar 2021 07:01  -  06 Mar 2021 20:22  --------------------------------------------------------  IN: 720 mL / OUT: 250 mL / NET: 470 mL          Appearance: NAD  HEENT:   Normal oral mucosa, PERRL, EOMI	  Lymphatic: No lymphadenopathy , no edema  Cardiovascular: Normal S1 S2, No JVD, No murmurs , Peripheral pulses palpable 2+ bilaterally  Respiratory: Decreased bs   Gastrointestinal:  Soft, Non-tender, + BS	  Skin: No rashes, No ecchymoses, No cyanosis, warm to touch  Musculoskeletal: Normal range of motion, normal strength  Psychiatry:  Mood & affect appropriate  Ext: No edema      LABS:    CARDIAC MARKERS:                                12.8   9.21  )-----------( 378      ( 06 Mar 2021 06:51 )             40.7     03-06    142  |  103  |  38<H>  ----------------------------<  82  4.6   |  28  |  0.97    Ca    8.8      06 Mar 2021 06:45    TPro  6.5  /  Alb  2.8<L>  /  TBili  0.6  /  DBili  x   /  AST  43<H>  /  ALT  48<H>  /  AlkPhos  44  03-06    proBNP:   Lipid Profile:   HgA1c:   TSH:             TELEMETRY: 	  SR   ECG:  	  RADIOLOGY:   DIAGNOSTIC TESTING:  [ ] Echocardiogram:  [ ]  Catheterization:  [ ] Stress Test:    OTHER:

## 2021-03-06 NOTE — PROGRESS NOTE ADULT - SUBJECTIVE AND OBJECTIVE BOX
Infectious Diseases progress note:    Subjective:     ROS:  CONSTITUTIONAL:  No fever, chills, rigors  CARDIOVASCULAR:  No chest pain or palpitations  RESPIRATORY:   No SOB, cough, dyspnea on exertion.  No wheezing  GASTROINTESTINAL:  No abd pain, N/V, diarrhea/constipation  EXTREMITIES:  No swelling or joint pain  GENITOURINARY:  No burning on urination, increased frequency or urgency.  No flank pain  NEUROLOGIC:  No HA, visual disturbances  SKIN: No rashes    Allergies    Actifed (Headache)  Sudafed (Headache)    Intolerances        ANTIBIOTICS/RELEVANT:  antimicrobials    immunologic:    OTHER:  acetaminophen  IVPB .. 1000 milliGRAM(s) IV Intermittent once  dexAMETHasone  Injectable 6 milliGRAM(s) IV Push daily  diltiazem    Tablet 30 milliGRAM(s) Oral three times a day  enoxaparin Injectable 140 milliGRAM(s) SubCutaneous every 12 hours      Objective:  Vital Signs Last 24 Hrs  T(C): 36.6 (06 Mar 2021 11:12), Max: 36.6 (06 Mar 2021 11:12)  T(F): 97.9 (06 Mar 2021 11:12), Max: 97.9 (06 Mar 2021 11:12)  HR: 72 (06 Mar 2021 11:12) (71 - 80)  BP: 133/80 (06 Mar 2021 11:12) (121/85 - 144/80)  BP(mean): --  RR: 19 (06 Mar 2021 11:12) (19 - 20)  SpO2: 96% (06 Mar 2021 11:12) (91% - 97%)    PHYSICAL EXAM:  Constitutional:NAD  Eyes:KRISTEN, EOMI  Ear/Nose/Throat: no thrush, mucositis.  Moist mucous membranes	  Neck:no JVD, no lymphadenopathy, supple  Respiratory: CTA berna  Cardiovascular: S1S2 RRR, no murmurs  Gastrointestinal:soft, nontender,  nondistended (+) BS  Extremities:no e/e/c  Skin:  no rashes, open wounds or ulcerations        LABS:                        12.8   9.21  )-----------( 378      ( 06 Mar 2021 06:51 )             40.7     03-06    142  |  103  |  38<H>  ----------------------------<  82  4.6   |  28  |  0.97    Ca    8.8      06 Mar 2021 06:45    TPro  6.5  /  Alb  2.8<L>  /  TBili  0.6  /  DBili  x   /  AST  43<H>  /  ALT  48<H>  /  AlkPhos  44  03-06    PT/INR - ( 06 Mar 2021 09:10 )   PT: 14.3 sec;   INR: 1.21 ratio               Procalcitonin, Serum: 0.58 (02-27 @ 13:25)                    MICROBIOLOGY:          RADIOLOGY & ADDITIONAL STUDIES:     Infectious Diseases progress note:    Subjective:  Pt sitting comfortably, on NRB.  No new somatic complaints.     ROS:  CONSTITUTIONAL:  No fever, chills, rigors  CARDIOVASCULAR:  No chest pain or palpitations  RESPIRATORY:   No SOB, cough, dyspnea on exertion.  No wheezing  GASTROINTESTINAL:  No abd pain, N/V, diarrhea/constipation  EXTREMITIES:  No swelling or joint pain  GENITOURINARY:  No burning on urination, increased frequency or urgency.  No flank pain  NEUROLOGIC:  No HA, visual disturbances  SKIN: No rashes    Allergies    Actifed (Headache)  Sudafed (Headache)    Intolerances        ANTIBIOTICS/RELEVANT:  antimicrobials    immunologic:    OTHER:  acetaminophen  IVPB .. 1000 milliGRAM(s) IV Intermittent once  dexAMETHasone  Injectable 6 milliGRAM(s) IV Push daily  diltiazem    Tablet 30 milliGRAM(s) Oral three times a day  enoxaparin Injectable 140 milliGRAM(s) SubCutaneous every 12 hours      Objective:  Vital Signs Last 24 Hrs  T(C): 36.6 (06 Mar 2021 11:12), Max: 36.6 (06 Mar 2021 11:12)  T(F): 97.9 (06 Mar 2021 11:12), Max: 97.9 (06 Mar 2021 11:12)  HR: 72 (06 Mar 2021 11:12) (71 - 80)  BP: 133/80 (06 Mar 2021 11:12) (121/85 - 144/80)  BP(mean): --  RR: 19 (06 Mar 2021 11:12) (19 - 20)  SpO2: 96% (06 Mar 2021 11:12) (91% - 97%)    PHYSICAL EXAM:  Constitutional:NAD, obese  Eyes:KRISTEN, EOMI  Ear/Nose/Throat: no thrush, mucositis.  Moist mucous membranes	  Neck:no JVD, no lymphadenopathy, supple  Respiratory: CTA berna  Cardiovascular: S1S2 RRR, no murmurs  Gastrointestinal:soft, nontender,  nondistended (+) BS  Extremities:no e/e/c  Skin:  no rashes, open wounds or ulcerations        LABS:                        12.8   9.21  )-----------( 378      ( 06 Mar 2021 06:51 )             40.7     03-06    142  |  103  |  38<H>  ----------------------------<  82  4.6   |  28  |  0.97    Ca    8.8      06 Mar 2021 06:45    TPro  6.5  /  Alb  2.8<L>  /  TBili  0.6  /  DBili  x   /  AST  43<H>  /  ALT  48<H>  /  AlkPhos  44  03-06    PT/INR - ( 06 Mar 2021 09:10 )   PT: 14.3 sec;   INR: 1.21 ratio               Procalcitonin, Serum: 0.58 (02-27 @ 13:25)                    MICROBIOLOGY:      Culture - Blood (02.27.21 @ 18:02)   Specimen Source: .Blood Blood   Culture Results:   No Growth Final         RADIOLOGY & ADDITIONAL STUDIES:    < from: [facility] Duplex Lower Ext Vein Scan, Bilat (03.02.21 @ 20:01) >  FINDINGS:    There is normal compressibility of the bilateral common femoral, femoral and popliteal veins.  Doppler examination shows normal spontaneous and phasic flow.    The calf veins were not imaged as per Covid protocol.    IMPRESSION:  No evidence of deep venous thrombosis in either lower extremity.    < end of copied text >      < from: Xray Chest 1 View- PORTABLE-Urgent (Xray Chest 1 View- PORTABLE-Urgent .) (03.01.21 @ 17:58) >  Impression:    The heart is enlarged. Diffuse airspace opacity seen throughout both lungs which remain unchanged when compared to previous study done on February 27, 2021. Changes compatible with Covid 19 pneumonia.    < end of copied text >

## 2021-03-06 NOTE — PROGRESS NOTE ADULT - ASSESSMENT
ACute hypoxemic respiratory failure  Obesity  Multilobar viral pneumonia due to COVID 19  Atrial Fibrillation: now back in NSR    REC    Target O2 sat 88-90%  LE dopplers negtive DVT; admission CTA negative PE  Trial 10 L NRB, he hates high flow

## 2021-03-07 LAB
ALBUMIN SERPL ELPH-MCNC: 2.6 G/DL — LOW (ref 3.3–5)
ALP SERPL-CCNC: 43 U/L — SIGNIFICANT CHANGE UP (ref 40–120)
ALT FLD-CCNC: 44 U/L — SIGNIFICANT CHANGE UP (ref 10–45)
ANION GAP SERPL CALC-SCNC: 7 MMOL/L — SIGNIFICANT CHANGE UP (ref 5–17)
AST SERPL-CCNC: 34 U/L — SIGNIFICANT CHANGE UP (ref 10–40)
BILIRUB SERPL-MCNC: 0.5 MG/DL — SIGNIFICANT CHANGE UP (ref 0.2–1.2)
BUN SERPL-MCNC: 32 MG/DL — HIGH (ref 7–23)
CALCIUM SERPL-MCNC: 8.5 MG/DL — SIGNIFICANT CHANGE UP (ref 8.4–10.5)
CHLORIDE SERPL-SCNC: 103 MMOL/L — SIGNIFICANT CHANGE UP (ref 96–108)
CO2 SERPL-SCNC: 30 MMOL/L — SIGNIFICANT CHANGE UP (ref 22–31)
CREAT SERPL-MCNC: 0.89 MG/DL — SIGNIFICANT CHANGE UP (ref 0.5–1.3)
CRP SERPL-MCNC: 1.09 MG/DL — HIGH (ref 0–0.4)
D DIMER BLD IA.RAPID-MCNC: 248 NG/ML DDU — HIGH
FERRITIN SERPL-MCNC: 899 NG/ML — HIGH (ref 30–400)
GLUCOSE SERPL-MCNC: 90 MG/DL — SIGNIFICANT CHANGE UP (ref 70–99)
HCT VFR BLD CALC: 38.4 % — LOW (ref 39–50)
HGB BLD-MCNC: 12.3 G/DL — LOW (ref 13–17)
INR BLD: 1.1 RATIO — SIGNIFICANT CHANGE UP (ref 0.88–1.16)
MAGNESIUM SERPL-MCNC: 2.9 MG/DL — HIGH (ref 1.6–2.6)
MCHC RBC-ENTMCNC: 29.3 PG — SIGNIFICANT CHANGE UP (ref 27–34)
MCHC RBC-ENTMCNC: 32 GM/DL — SIGNIFICANT CHANGE UP (ref 32–36)
MCV RBC AUTO: 91.4 FL — SIGNIFICANT CHANGE UP (ref 80–100)
NRBC # BLD: 0 /100 WBCS — SIGNIFICANT CHANGE UP (ref 0–0)
PHOSPHATE SERPL-MCNC: 3.4 MG/DL — SIGNIFICANT CHANGE UP (ref 2.5–4.5)
PLATELET # BLD AUTO: 391 K/UL — SIGNIFICANT CHANGE UP (ref 150–400)
POTASSIUM SERPL-MCNC: 4.2 MMOL/L — SIGNIFICANT CHANGE UP (ref 3.5–5.3)
POTASSIUM SERPL-SCNC: 4.2 MMOL/L — SIGNIFICANT CHANGE UP (ref 3.5–5.3)
PROT SERPL-MCNC: 6.2 G/DL — SIGNIFICANT CHANGE UP (ref 6–8.3)
PROTHROM AB SERPL-ACNC: 13.1 SEC — SIGNIFICANT CHANGE UP (ref 10.6–13.6)
RBC # BLD: 4.2 M/UL — SIGNIFICANT CHANGE UP (ref 4.2–5.8)
RBC # FLD: 13.3 % — SIGNIFICANT CHANGE UP (ref 10.3–14.5)
SODIUM SERPL-SCNC: 140 MMOL/L — SIGNIFICANT CHANGE UP (ref 135–145)
WBC # BLD: 10.01 K/UL — SIGNIFICANT CHANGE UP (ref 3.8–10.5)
WBC # FLD AUTO: 10.01 K/UL — SIGNIFICANT CHANGE UP (ref 3.8–10.5)

## 2021-03-07 RX ADMIN — ENOXAPARIN SODIUM 140 MILLIGRAM(S): 100 INJECTION SUBCUTANEOUS at 18:35

## 2021-03-07 RX ADMIN — Medication 30 MILLIGRAM(S): at 21:50

## 2021-03-07 RX ADMIN — Medication 30 MILLIGRAM(S): at 05:43

## 2021-03-07 RX ADMIN — ENOXAPARIN SODIUM 140 MILLIGRAM(S): 100 INJECTION SUBCUTANEOUS at 05:43

## 2021-03-07 RX ADMIN — Medication 6 MILLIGRAM(S): at 05:43

## 2021-03-07 RX ADMIN — Medication 30 MILLIGRAM(S): at 13:11

## 2021-03-07 NOTE — PROGRESS NOTE ADULT - SUBJECTIVE AND OBJECTIVE BOX
Patient is a 65y old  Male who presents with a chief complaint of acute hypoxemic respiratory failure (07 Mar 2021 14:49)      SUBJECTIVE / OVERNIGHT EVENTS: remains on NRB    MEDICATIONS  (STANDING):  acetaminophen  IVPB .. 1000 milliGRAM(s) IV Intermittent once  dexAMETHasone  Injectable 6 milliGRAM(s) IV Push daily  diltiazem    Tablet 30 milliGRAM(s) Oral three times a day  enoxaparin Injectable 140 milliGRAM(s) SubCutaneous every 12 hours    MEDICATIONS  (PRN):      Vital Signs Last 24 Hrs  T(F): 98.3 (03-07-21 @ 11:13), Max: 98.3 (03-07-21 @ 04:59)  HR: 79 (03-07-21 @ 15:55) (71 - 823)  BP: 134/86 (03-07-21 @ 11:13) (125/76 - 150/98)  RR: 20 (03-07-21 @ 15:55) (17 - 20)  SpO2: 93% (03-07-21 @ 15:55) (92% - 98%)  Telemetry:   CAPILLARY BLOOD GLUCOSE        I&O's Summary    06 Mar 2021 07:01  -  07 Mar 2021 07:00  --------------------------------------------------------  IN: 920 mL / OUT: 700 mL / NET: 220 mL    07 Mar 2021 07:01  -  07 Mar 2021 16:35  --------------------------------------------------------  IN: 780 mL / OUT: 400 mL / NET: 380 mL        PHYSICAL EXAM:  GENERAL: NAD, well-developed  HEAD:  Atraumatic, Normocephalic  EYES: EOMI, PERRLA, conjunctiva and sclera clear  NECK: Supple, No JVD  CHEST/LUNG: Clear to auscultation bilaterally; No wheeze  HEART: Regular rate and rhythm; No murmurs, rubs, or gallops  ABDOMEN: Soft, Nontender, Nondistended; Bowel sounds present  EXTREMITIES:  2+ Peripheral Pulses, No clubbing, cyanosis, or edema  PSYCH: AAOx3  NEUROLOGY: non-focal  SKIN: No rashes or lesions    LABS:                        12.3   10.01 )-----------( 391      ( 07 Mar 2021 05:20 )             38.4     03-07    140  |  103  |  32<H>  ----------------------------<  90  4.2   |  30  |  0.89    Ca    8.5      07 Mar 2021 05:18  Phos  3.4     03-07  Mg     2.9     03-07    TPro  6.2  /  Alb  2.6<L>  /  TBili  0.5  /  DBili  x   /  AST  34  /  ALT  44  /  AlkPhos  43  03-07    PT/INR - ( 07 Mar 2021 05:21 )   PT: 13.1 sec;   INR: 1.10 ratio                   RADIOLOGY & ADDITIONAL TESTS:    Imaging Personally Reviewed:    Consultant(s) Notes Reviewed:      Care Discussed with Consultants/Other Providers:

## 2021-03-07 NOTE — PROGRESS NOTE ADULT - SUBJECTIVE AND OBJECTIVE BOX
Follow-up Pulm Progress Note    Unchanged today.     Medications:  MEDICATIONS  (STANDING):  acetaminophen  IVPB .. 1000 milliGRAM(s) IV Intermittent once  dexAMETHasone  Injectable 6 milliGRAM(s) IV Push daily  diltiazem    Tablet 30 milliGRAM(s) Oral three times a day  enoxaparin Injectable 140 milliGRAM(s) SubCutaneous every 12 hours    MEDICATIONS  (PRN):    Vital Signs Last 24 Hrs  T(C): 36.8 (07 Mar 2021 11:13), Max: 36.8 (07 Mar 2021 04:59)  T(F): 98.3 (07 Mar 2021 11:13), Max: 98.3 (07 Mar 2021 04:59)  HR: 77 (07 Mar 2021 11:13) (71 - 823)  BP: 134/86 (07 Mar 2021 11:13) (125/76 - 150/98)  BP(mean): --  RR: 17 (07 Mar 2021 11:13) (17 - 20)  SpO2: 94% (07 Mar 2021 11:13) (92% - 98%)    03-06 @ 07:01  -  03-07 @ 07:00  --------------------------------------------------------  IN: 920 mL / OUT: 700 mL / NET: 220 mL    LABS:                        12.3   10.01 )-----------( 391      ( 07 Mar 2021 05:20 )             38.4     03-07    140  |  103  |  32<H>  ----------------------------<  90  4.2   |  30  |  0.89    Ca    8.5      07 Mar 2021 05:18  Phos  3.4     03-07  Mg     2.9     03-07    TPro  6.2  /  Alb  2.6<L>  /  TBili  0.5  /  DBili  x   /  AST  34  /  ALT  44  /  AlkPhos  43  03-07    CAPILLARY BLOOD GLUCOSE    PT/INR - ( 07 Mar 2021 05:21 )   PT: 13.1 sec;   INR: 1.10 ratio      CULTURES: (if applicable)    Culture - Blood (collected 02-27-21 @ 18:02)  Source: .Blood Blood  Final Report (03-04-21 @ 19:00):    No Growth Final    Culture - Blood (collected 02-27-21 @ 18:02)  Source: .Blood Blood  Final Report (03-04-21 @ 19:00):    No Growth Final    Physical Examination:  PULM: Decreased BS at bases  CVS: S1, S2 heard    RADIOLOGY REVIEWED  CXR:     CT chest:    TTE:

## 2021-03-07 NOTE — PROGRESS NOTE ADULT - SUBJECTIVE AND OBJECTIVE BOX
Subjective: Patient seen and examined. No new events except as noted.     REVIEW OF SYSTEMS:    CONSTITUTIONAL:+ weakness, fevers or chills  EYES/ENT: No visual changes;  No vertigo or throat pain   NECK: No pain or stiffness  RESPIRATORY: No cough, wheezing, hemoptysis; No shortness of breath  CARDIOVASCULAR: No chest pain or palpitations  GASTROINTESTINAL: No abdominal or epigastric pain. No nausea, vomiting, or hematemesis; No diarrhea or constipation. No melena or hematochezia.  GENITOURINARY: No dysuria, frequency or hematuria  NEUROLOGICAL: No numbness or weakness  SKIN: No itching, burning, rashes, or lesions   All other review of systems is negative unless indicated above.    MEDICATIONS:  MEDICATIONS  (STANDING):  acetaminophen  IVPB .. 1000 milliGRAM(s) IV Intermittent once  dexAMETHasone  Injectable 6 milliGRAM(s) IV Push daily  diltiazem    Tablet 30 milliGRAM(s) Oral three times a day  enoxaparin Injectable 140 milliGRAM(s) SubCutaneous every 12 hours      PHYSICAL EXAM:  T(C): 36.8 (03-07-21 @ 04:59), Max: 36.8 (03-07-21 @ 04:59)  HR: 71 (03-07-21 @ 10:03) (71 - 823)  BP: 125/76 (03-07-21 @ 04:59) (125/76 - 150/98)  RR: 18 (03-07-21 @ 07:25) (18 - 20)  SpO2: 96% (03-07-21 @ 10:03) (92% - 98%)  Wt(kg): --  I&O's Summary    06 Mar 2021 07:01  -  07 Mar 2021 07:00  --------------------------------------------------------  IN: 920 mL / OUT: 700 mL / NET: 220 mL          Appearance: NAD NRB   HEENT:   Dry  oral mucosa, PERRL, EOMI	  Lymphatic: No lymphadenopathy , no edema  Cardiovascular: Normal S1 S2, No JVD, No murmurs , Peripheral pulses palpable 2+ bilaterally  Respiratory: Lungs clear to auscultation, normal effort 	  Gastrointestinal:  Soft, Non-tender, + BS	  Skin: No rashes, No ecchymoses, No cyanosis, warm to touch  Musculoskeletal: Normal range of motion, normal strength  Psychiatry:  Mood & affect appropriate  Ext: No edema      LABS:    CARDIAC MARKERS:                                12.3   10.01 )-----------( 391      ( 07 Mar 2021 05:20 )             38.4     03-07    140  |  103  |  32<H>  ----------------------------<  90  4.2   |  30  |  0.89    Ca    8.5      07 Mar 2021 05:18  Phos  3.4     03-07  Mg     2.9     03-07    TPro  6.2  /  Alb  2.6<L>  /  TBili  0.5  /  DBili  x   /  AST  34  /  ALT  44  /  AlkPhos  43  03-07    proBNP:   Lipid Profile:   HgA1c:   TSH:             TELEMETRY: 	SR     ECG:  	  RADIOLOGY:   DIAGNOSTIC TESTING:  [ ] Echocardiogram:  [ ]  Catheterization:  [ ] Stress Test:    OTHER:

## 2021-03-08 LAB
ALBUMIN SERPL ELPH-MCNC: 2.7 G/DL — LOW (ref 3.3–5)
ALP SERPL-CCNC: 46 U/L — SIGNIFICANT CHANGE UP (ref 40–120)
ALT FLD-CCNC: 39 U/L — SIGNIFICANT CHANGE UP (ref 10–45)
AST SERPL-CCNC: 29 U/L — SIGNIFICANT CHANGE UP (ref 10–40)
BILIRUB DIRECT SERPL-MCNC: 0.1 MG/DL — SIGNIFICANT CHANGE UP (ref 0–0.2)
BILIRUB INDIRECT FLD-MCNC: 0.4 MG/DL — SIGNIFICANT CHANGE UP (ref 0.2–1)
BILIRUB SERPL-MCNC: 0.5 MG/DL — SIGNIFICANT CHANGE UP (ref 0.2–1.2)
CREAT SERPL-MCNC: 0.91 MG/DL — SIGNIFICANT CHANGE UP (ref 0.5–1.3)
INR BLD: 1.19 RATIO — HIGH (ref 0.88–1.16)
PROT SERPL-MCNC: 6.1 G/DL — SIGNIFICANT CHANGE UP (ref 6–8.3)
PROTHROM AB SERPL-ACNC: 14.1 SEC — HIGH (ref 10.6–13.6)

## 2021-03-08 PROCEDURE — 71045 X-RAY EXAM CHEST 1 VIEW: CPT | Mod: 26

## 2021-03-08 PROCEDURE — 93306 TTE W/DOPPLER COMPLETE: CPT | Mod: 26

## 2021-03-08 RX ORDER — ACETAMINOPHEN 500 MG
650 TABLET ORAL EVERY 6 HOURS
Refills: 0 | Status: DISCONTINUED | OUTPATIENT
Start: 2021-03-08 | End: 2021-04-05

## 2021-03-08 RX ADMIN — Medication 6 MILLIGRAM(S): at 05:06

## 2021-03-08 RX ADMIN — Medication 30 MILLIGRAM(S): at 21:54

## 2021-03-08 RX ADMIN — ENOXAPARIN SODIUM 140 MILLIGRAM(S): 100 INJECTION SUBCUTANEOUS at 17:55

## 2021-03-08 RX ADMIN — ENOXAPARIN SODIUM 140 MILLIGRAM(S): 100 INJECTION SUBCUTANEOUS at 05:06

## 2021-03-08 RX ADMIN — Medication 650 MILLIGRAM(S): at 11:40

## 2021-03-08 RX ADMIN — Medication 650 MILLIGRAM(S): at 10:40

## 2021-03-08 RX ADMIN — Medication 30 MILLIGRAM(S): at 14:08

## 2021-03-08 RX ADMIN — Medication 30 MILLIGRAM(S): at 05:06

## 2021-03-08 NOTE — PROGRESS NOTE ADULT - SUBJECTIVE AND OBJECTIVE BOX
Infectious Diseases progress note:    Subjective:  Remains on NRB.  Repeat cxr performed.  Pt afebrile    ROS:  CONSTITUTIONAL:  No fever, chills, rigors  CARDIOVASCULAR:  No chest pain or palpitations  RESPIRATORY:   No SOB, cough, dyspnea on exertion.  No wheezing  GASTROINTESTINAL:  No abd pain, N/V, diarrhea/constipation  EXTREMITIES:  No swelling or joint pain  GENITOURINARY:  No burning on urination, increased frequency or urgency.  No flank pain  NEUROLOGIC:  No HA, visual disturbances  SKIN: No rashes    Allergies    Actifed (Headache)  Sudafed (Headache)    Intolerances        ANTIBIOTICS/RELEVANT:  antimicrobials    immunologic:    OTHER:  acetaminophen   Tablet .. 650 milliGRAM(s) Oral every 6 hours PRN  diltiazem    Tablet 30 milliGRAM(s) Oral three times a day  enoxaparin Injectable 140 milliGRAM(s) SubCutaneous every 12 hours      Objective:  Vital Signs Last 24 Hrs  T(C): 36.4 (08 Mar 2021 10:46), Max: 36.8 (08 Mar 2021 03:57)  T(F): 97.5 (08 Mar 2021 10:46), Max: 98.2 (08 Mar 2021 03:57)  HR: 71 (08 Mar 2021 15:48) (64 - 82)  BP: 140/89 (08 Mar 2021 10:46) (122/82 - 140/89)  BP(mean): --  RR: 20 (08 Mar 2021 15:48) (19 - 20)  SpO2: 95% (08 Mar 2021 15:48) (95% - 99%)    PHYSICAL EXAM:  Constitutional:NAD  Eyes:KRISTEN, EOMI  Ear/Nose/Throat: no thrush, mucositis.  Moist mucous membranes	  Neck:no JVD, no lymphadenopathy, supple  Respiratory: CTA berna  Cardiovascular: S1S2 RRR, no murmurs  Gastrointestinal:soft, nontender,  nondistended (+) BS  Extremities:no e/e/c  Skin:  no rashes, open wounds or ulcerations        LABS:                        12.3   10.01 )-----------( 391      ( 07 Mar 2021 05:20 )             38.4     03-08    x   |  x   |  x   ----------------------------<  x   x    |  x   |  0.91    Ca    8.5      07 Mar 2021 05:18  Phos  3.4     03-07  Mg     2.9     03-07    TPro  6.1  /  Alb  2.7<L>  /  TBili  0.5  /  DBili  0.1  /  AST  29  /  ALT  39  /  AlkPhos  46  03-08    PT/INR - ( 08 Mar 2021 08:18 )   PT: 14.1 sec;   INR: 1.19 ratio               Procalcitonin, Serum: 0.58 (02-27 @ 13:25)                    MICROBIOLOGY:          RADIOLOGY & ADDITIONAL STUDIES:    < from: Xray Chest 1 View- PORTABLE-Routine (Xray Chest 1 View- PORTABLE-Routine .) (03.08.21 @ 15:49) >    Impression:    The heart is slightly enlarged. Airway opacities are seen in both lungs compatible with multifocal pneumonia such as seen in Covid 19. This appears to be slightly worsened when compared to previous study done March 1, 2021 at 5:49 PM.    < end of copied text >

## 2021-03-08 NOTE — PROGRESS NOTE ADULT - SUBJECTIVE AND OBJECTIVE BOX
Subjective: Patient seen and examined. No new events except as noted.   feels ok   no cp or sob      REVIEW OF SYSTEMS:    CONSTITUTIONAL:+weakness, fevers or chills  EYES/ENT: No visual changes;  No vertigo or throat pain   NECK: No pain or stiffness  RESPIRATORY: No cough, wheezing, hemoptysis; No shortness of breath  CARDIOVASCULAR: No chest pain or palpitations  GASTROINTESTINAL: No abdominal or epigastric pain. No nausea, vomiting, or hematemesis; No diarrhea or constipation. No melena or hematochezia.  GENITOURINARY: No dysuria, frequency or hematuria  NEUROLOGICAL: No numbness or weakness  SKIN: No itching, burning, rashes, or lesions   All other review of systems is negative unless indicated above.    MEDICATIONS:  MEDICATIONS  (STANDING):  acetaminophen  IVPB .. 1000 milliGRAM(s) IV Intermittent once  dexAMETHasone  Injectable 6 milliGRAM(s) IV Push daily  diltiazem    Tablet 30 milliGRAM(s) Oral three times a day  enoxaparin Injectable 140 milliGRAM(s) SubCutaneous every 12 hours      PHYSICAL EXAM:  T(C): 36.8 (03-08-21 @ 03:57), Max: 36.8 (03-07-21 @ 11:13)  HR: 82 (03-08-21 @ 10:19) (64 - 82)  BP: 131/88 (03-08-21 @ 03:57) (122/82 - 134/86)  RR: 20 (03-08-21 @ 03:57) (17 - 20)  SpO2: 95% (03-08-21 @ 10:19) (93% - 99%)  Wt(kg): --  I&O's Summary    07 Mar 2021 07:01  -  08 Mar 2021 07:00  --------------------------------------------------------  IN: 1450 mL / OUT: 1450 mL / NET: 0 mL    08 Mar 2021 07:01  -  08 Mar 2021 10:25  --------------------------------------------------------  IN: 240 mL / OUT: 100 mL / NET: 140 mL          Appearance: NAD   HEENT:   Normal oral mucosa, PERRL, EOMI	  Lymphatic: No lymphadenopathy , no edema  Cardiovascular: Normal S1 S2, No JVD, No murmurs , Peripheral pulses palpable 2+ bilaterally  Respiratory: Decreased bs    Gastrointestinal:  Soft, Non-tender, + BS	  Skin: No rashes, No ecchymoses, No cyanosis, warm to touch  Musculoskeletal: Normal range of motion, normal strength  Psychiatry:  Mood & affect appropriate  Ext: No edema      LABS:    CARDIAC MARKERS:                                12.3   10.01 )-----------( 391      ( 07 Mar 2021 05:20 )             38.4     03-08    x   |  x   |  x   ----------------------------<  x   x    |  x   |  0.91    Ca    8.5      07 Mar 2021 05:18  Phos  3.4     03-07  Mg     2.9     03-07    TPro  6.1  /  Alb  2.7<L>  /  TBili  0.5  /  DBili  0.1  /  AST  29  /  ALT  39  /  AlkPhos  46  03-08    proBNP:   Lipid Profile:   HgA1c:   TSH:             TELEMETRY: 	  SR   ECG:  	  RADIOLOGY:   DIAGNOSTIC TESTING:  [ ] Echocardiogram:  [ ]  Catheterization:  [ ] Stress Test:    OTHER:

## 2021-03-08 NOTE — PROGRESS NOTE ADULT - ASSESSMENT
ACute hypoxemic respiratory failure  Obesity  Multilobar viral pneumonia due to COVID 19  Atrial Fibrillation: now back in NSR    REC    Trial 50% venti mask: target sat 88-90%  Transition to regular nasal cannula as tolerated  Observe off antibiotics  Repeat CXR ordered

## 2021-03-08 NOTE — PROGRESS NOTE ADULT - SUBJECTIVE AND OBJECTIVE BOX
Follow-up Pulm Progress Note  Francisco Ramon MD  [phone]    Afebrile with stable respiratory status, though no change in high FIO2 requirement  OOB sleeping in chair on NRB at time of visit: sats in upper 90's      Vital Signs Last 24 Hrs  T(C): 36.4 (08 Mar 2021 10:46), Max: 36.8 (08 Mar 2021 03:57)  T(F): 97.5 (08 Mar 2021 10:46), Max: 98.2 (08 Mar 2021 03:57)  HR: 71 (08 Mar 2021 10:46) (64 - 82)  BP: 140/89 (08 Mar 2021 10:46) (122/82 - 140/89)  BP(mean): --  RR: 19 (08 Mar 2021 10:46) (19 - 20)  SpO2: 99% (08 Mar 2021 10:46) (93% - 99%)                        12.3   10.01 )-----------( 391      ( 07 Mar 2021 05:20 )             38.4       03-08    x   |  x   |  x   ----------------------------<  x   x    |  x   |  0.91    Ca    8.5      07 Mar 2021 05:18  Phos  3.4     03-07  Mg     2.9     03-07    TPro  6.1  /  Alb  2.7<L>  /  TBili  0.5  /  DBili  0.1  /  AST  29  /  ALT  39  /  AlkPhos  46  03-08    CULTURES:  Culture Results:   No growth to date. (02-27 @ 18:02)  Culture Results:   No growth to date. (02-27 @ 18:02)    Most recent blood culture -- 02-27 @ 18:02   -- -- .Blood Blood 02-27 @ 18:02      Physical Examination:  PULM: No wheeze  CVS: Regular rate and rhythm, no murmurs, rubs, or gallops  ABD: Soft, non-tender  EXT:  No clubbing, cyanosis, or edema    RADIOLOGY REVIEWED  CXR:    CT chest:    PROCEDURE DATE:  02/27/2021        INTERPRETATION:  CLINICAL INFORMATION: Hypoxia, Covid 19 infection, assess for pulmonary embolism    COMPARISON: Chest radiograph from same day    PROCEDURE:  CT Angiography of the Chest.  90 ml of Omnipaque 350 was injected intravenously. 10 ml were discarded.  Sagittal and coronal reformats were performed as well as 3D (MIP) reconstructions.    FINDINGS:    LUNGS AND AIRWAYS: Patent central airways.  Bilateral patchy groundglass and consolidative opacities with peripheral and lower lobe predominance.  PLEURA: No pleural effusion.  MEDIASTINUM AND KASIE: No lymphadenopathy.  VESSELS: No central, main, lobar, or segmental pulmonary embolism. Evaluation of the subsegmental pulmonary arteries is limited/nondiagnostic secondary to respiratory motion.  HEART: Heart size is normal. No pericardial effusion.  CHEST WALL AND LOWER NECK: Prominent right upper paratracheal lymph node may be reactive  VISUALIZED UPPER ABDOMEN: Small hiatal hernia.  BONES: Spinal degenerative changes.    IMPRESSION:  No central, main, lobar, or segmental pulmonary embolism. Evaluation of the subsegmental pulmonary arteries is limited/nondiagnostic secondary to respiratory motion.    Bilateral patchy groundglass and consolidative opacities with peripheral and lower lobe predominance, compatible with patient's known Covid 19 infection. Follow-up to resolution recommended.    TTE:

## 2021-03-08 NOTE — PROGRESS NOTE ADULT - SUBJECTIVE AND OBJECTIVE BOX
Patient is a 65y old  Male who presents with a chief complaint of acute hypoxemic respiratory failure (08 Mar 2021 18:55)      SUBJECTIVE / OVERNIGHT EVENTS: remains on NRB, unable to get off NRB, completed remdesivir and decadron    MEDICATIONS  (STANDING):  diltiazem    Tablet 30 milliGRAM(s) Oral three times a day  enoxaparin Injectable 140 milliGRAM(s) SubCutaneous every 12 hours    MEDICATIONS  (PRN):  acetaminophen   Tablet .. 650 milliGRAM(s) Oral every 6 hours PRN Mild Pain (1 - 3)      Vital Signs Last 24 Hrs  T(F): 97.6 (03-08-21 @ 21:07), Max: 98.2 (03-08-21 @ 03:57)  HR: 70 (03-08-21 @ 21:07) (68 - 82)  BP: 144/80 (03-08-21 @ 21:07) (131/88 - 144/80)  RR: 20 (03-08-21 @ 21:07) (19 - 20)  SpO2: 97% (03-08-21 @ 21:07) (95% - 99%)  Telemetry:   CAPILLARY BLOOD GLUCOSE        I&O's Summary    07 Mar 2021 07:01  -  08 Mar 2021 07:00  --------------------------------------------------------  IN: 1450 mL / OUT: 1450 mL / NET: 0 mL    08 Mar 2021 07:01  -  08 Mar 2021 22:32  --------------------------------------------------------  IN: 1260 mL / OUT: 1200 mL / NET: 60 mL        PHYSICAL EXAM:  GENERAL: NAD, well-developed  HEAD:  Atraumatic, Normocephalic  EYES: EOMI, PERRLA, conjunctiva and sclera clear  NECK: Supple, No JVD  CHEST/LUNG: Clear to auscultation bilaterally; No wheeze  HEART: Regular rate and rhythm; No murmurs, rubs, or gallops  ABDOMEN: Soft, Nontender, Nondistended; Bowel sounds present  EXTREMITIES:  2+ Peripheral Pulses, No clubbing, cyanosis, or edema  PSYCH: AAOx3  NEUROLOGY: non-focal  SKIN: No rashes or lesions    LABS:                        12.3   10.01 )-----------( 391      ( 07 Mar 2021 05:20 )             38.4     03-08    x   |  x   |  x   ----------------------------<  x   x    |  x   |  0.91    Ca    8.5      07 Mar 2021 05:18  Phos  3.4     03-07  Mg     2.9     03-07    TPro  6.1  /  Alb  2.7<L>  /  TBili  0.5  /  DBili  0.1  /  AST  29  /  ALT  39  /  AlkPhos  46  03-08    PT/INR - ( 08 Mar 2021 08:18 )   PT: 14.1 sec;   INR: 1.19 ratio                   RADIOLOGY & ADDITIONAL TESTS:    Imaging Personally Reviewed:    Consultant(s) Notes Reviewed:      Care Discussed with Consultants/Other Providers:

## 2021-03-08 NOTE — PROGRESS NOTE ADULT - ASSESSMENT
The patient is a 65y Male, hx of morbid obesity, HTN on lisinopril, complaining of shortness of breath. covid + dx 5d prior. no home O2 requirement, former smoker. has dyspnea at rest and on exertion. has not taken steroids, no hospitalization for covid. denies CP. [facility] (27 Feb 2021 22:29)    ER vital:  T 98.4, P 178, /85.  Pt satting 85% on RA, placed on nasal cannula, advanced to NRB.  Covid pcr (+), covid serologies (-).   CT angio chest (-) for PE, (+) b/l patchy opacities.  Pt with elevated AST (>5x ULN but <10x) and ALT.   Pt started on remdesivir and dexamethasone.     Acute covid illness:    - Cont remdesivir and dexamethasone.  Monitor daily LFTs and renal function while pt on remdesivir.  LFTs elevated, possibly secondary to viral infection.  Currently ALT < 10x ULN, can cont remdesivir.  LFTs slowly improving.     - Monitor pulse ox, pt on supplemental O2 - now on Bipap    - Monitor inflammatory markers q72    DD:  213 <-- 282 <-- 343  ferritin: 1468 <-- 1636  pct: 0.58  crp: 10.3 <-- 10.9    - Pt on full dose lovenox for dvt/pe.  CT angio chest (-) for PE.   LE dopplers neg DVT    * Switched to NRB mask, saturating 96%, NAD.  Repeat cxr 3/8 with continued b/l patchy opacities with slight worsening.  No indication for abx at this time.     Will follow,    Wandy Luke  [phone]

## 2021-03-09 LAB
ANION GAP SERPL CALC-SCNC: 9 MMOL/L — SIGNIFICANT CHANGE UP (ref 5–17)
BUN SERPL-MCNC: 26 MG/DL — HIGH (ref 7–23)
CALCIUM SERPL-MCNC: 8.7 MG/DL — SIGNIFICANT CHANGE UP (ref 8.4–10.5)
CHLORIDE SERPL-SCNC: 102 MMOL/L — SIGNIFICANT CHANGE UP (ref 96–108)
CO2 SERPL-SCNC: 30 MMOL/L — SIGNIFICANT CHANGE UP (ref 22–31)
CREAT SERPL-MCNC: 0.94 MG/DL — SIGNIFICANT CHANGE UP (ref 0.5–1.3)
GLUCOSE SERPL-MCNC: 79 MG/DL — SIGNIFICANT CHANGE UP (ref 70–99)
HCT VFR BLD CALC: 37.3 % — LOW (ref 39–50)
HGB BLD-MCNC: 12 G/DL — LOW (ref 13–17)
MCHC RBC-ENTMCNC: 29.3 PG — SIGNIFICANT CHANGE UP (ref 27–34)
MCHC RBC-ENTMCNC: 32.2 GM/DL — SIGNIFICANT CHANGE UP (ref 32–36)
MCV RBC AUTO: 91.2 FL — SIGNIFICANT CHANGE UP (ref 80–100)
NRBC # BLD: 0 /100 WBCS — SIGNIFICANT CHANGE UP (ref 0–0)
PLATELET # BLD AUTO: 356 K/UL — SIGNIFICANT CHANGE UP (ref 150–400)
POTASSIUM SERPL-MCNC: 4.1 MMOL/L — SIGNIFICANT CHANGE UP (ref 3.5–5.3)
POTASSIUM SERPL-SCNC: 4.1 MMOL/L — SIGNIFICANT CHANGE UP (ref 3.5–5.3)
RBC # BLD: 4.09 M/UL — LOW (ref 4.2–5.8)
RBC # FLD: 13.1 % — SIGNIFICANT CHANGE UP (ref 10.3–14.5)
SODIUM SERPL-SCNC: 141 MMOL/L — SIGNIFICANT CHANGE UP (ref 135–145)
WBC # BLD: 9.33 K/UL — SIGNIFICANT CHANGE UP (ref 3.8–10.5)
WBC # FLD AUTO: 9.33 K/UL — SIGNIFICANT CHANGE UP (ref 3.8–10.5)

## 2021-03-09 RX ADMIN — Medication 30 MILLIGRAM(S): at 05:38

## 2021-03-09 RX ADMIN — Medication 30 MILLIGRAM(S): at 22:50

## 2021-03-09 RX ADMIN — Medication 30 MILLIGRAM(S): at 13:41

## 2021-03-09 RX ADMIN — ENOXAPARIN SODIUM 140 MILLIGRAM(S): 100 INJECTION SUBCUTANEOUS at 05:38

## 2021-03-09 NOTE — PROGRESS NOTE ADULT - SUBJECTIVE AND OBJECTIVE BOX
Subjective: Patient seen and examined. No new events except as noted.   remains on NRB     REVIEW OF SYSTEMS:    CONSTITUTIONAL: + weakness, fevers or chills  EYES/ENT: No visual changes;  No vertigo or throat pain   NECK: No pain or stiffness  RESPIRATORY: No cough, wheezing, hemoptysis; No shortness of breath  CARDIOVASCULAR: No chest pain or palpitations  GASTROINTESTINAL: No abdominal or epigastric pain. No nausea, vomiting, or hematemesis; No diarrhea or constipation. No melena or hematochezia.  GENITOURINARY: No dysuria, frequency or hematuria  NEUROLOGICAL: No numbness or weakness  SKIN: No itching, burning, rashes, or lesions   All other review of systems is negative unless indicated above.    MEDICATIONS:  MEDICATIONS  (STANDING):  diltiazem    Tablet 30 milliGRAM(s) Oral three times a day  enoxaparin Injectable 140 milliGRAM(s) SubCutaneous every 12 hours      PHYSICAL EXAM:  T(C): 36.8 (03-09-21 @ 05:36), Max: 36.8 (03-09-21 @ 05:36)  HR: 68 (03-09-21 @ 05:36) (63 - 73)  BP: 120/73 (03-09-21 @ 05:36) (120/73 - 144/80)  RR: 20 (03-09-21 @ 05:36) (19 - 20)  SpO2: 94% (03-09-21 @ 05:36) (92% - 99%)  Wt(kg): --  I&O's Summary    08 Mar 2021 07:01  -  09 Mar 2021 07:00  --------------------------------------------------------  IN: 1260 mL / OUT: 1850 mL / NET: -590 mL          Appearance: Normal	  HEENT:   Normal oral mucosa, PERRL, EOMI	  Lymphatic: No lymphadenopathy , no edema  Cardiovascular: Normal S1 S2, No JVD, No murmurs , Peripheral pulses palpable 2+ bilaterally  Respiratory: Decreased bs 	  Gastrointestinal:  Soft, Non-tender, + BS	  Skin: No rashes, No ecchymoses, No cyanosis, warm to touch  Musculoskeletal: Normal range of motion, normal strength  Psychiatry:  Mood & affect appropriate  Ext: No edema      LABS:    CARDIAC MARKERS:                                12.0   9.33  )-----------( 356      ( 09 Mar 2021 05:38 )             37.3     03-09    141  |  102  |  26<H>  ----------------------------<  79  4.1   |  30  |  0.94    Ca    8.7      09 Mar 2021 05:38    TPro  6.1  /  Alb  2.7<L>  /  TBili  0.5  /  DBili  0.1  /  AST  29  /  ALT  39  /  AlkPhos  46  03-08    proBNP:   Lipid Profile:   HgA1c:   TSH:             TELEMETRY: 	SR     ECG:  	  RADIOLOGY:   DIAGNOSTIC TESTING:  [ ] Echocardiogram:  [ ]  Catheterization:  [ ] Stress Test:    OTHER:

## 2021-03-09 NOTE — PROGRESS NOTE ADULT - ASSESSMENT
ACute hypoxemic respiratory failure  Obesity  Multilobar viral pneumonia due to COVID 19  Atrial Fibrillation: now back in NSR    REC    50% ventimask trial with target sat 88-90%  COntinue Lovenox  Mobilize OOB

## 2021-03-09 NOTE — PROGRESS NOTE ADULT - SUBJECTIVE AND OBJECTIVE BOX
Follow-up Pulm Progress Note  Francisco Ramon MD  [phone]    Comfortable in bed with stable resp status: still on NRB, refuses Hiflo    Vital Signs Last 24 Hrs  T(C): 36.7 (09 Mar 2021 12:06), Max: 36.8 (09 Mar 2021 05:36)  T(F): 98 (09 Mar 2021 12:06), Max: 98.2 (09 Mar 2021 05:36)  HR: 71 (09 Mar 2021 12:06) (63 - 81)  BP: 143/90 (09 Mar 2021 12:06) (120/73 - 144/80)  BP(mean): --  RR: 20 (09 Mar 2021 12:06) (20 - 20)  SpO2: 93% (09 Mar 2021 12:06) (92% - 97%)                        12.0   9.33  )-----------( 356      ( 09 Mar 2021 05:38 )             37.3     03-09    141  |  102  |  26<H>  ----------------------------<  79  4.1   |  30  |  0.94    Ca    8.7      09 Mar 2021 05:38    TPro  6.1  /  Alb  2.7<L>  /  TBili  0.5  /  DBili  0.1  /  AST  29  /  ALT  39  /  AlkPhos  46  03-08      CULTURES:  Culture Results:   No growth to date. (02-27 @ 18:02)  Culture Results:   No growth to date. (02-27 @ 18:02)    Most recent blood culture -- 02-27 @ 18:02   -- -- .Blood Blood 02-27 @ 18:02      Physical Examination:  PULM: No wheeze  CVS: Regular rate and rhythm, no murmurs, rubs, or gallops  ABD: Soft, non-tender  EXT:  No clubbing, cyanosis, or edema    RADIOLOGY REVIEWED  CXR:    CT chest:    PROCEDURE DATE:  02/27/2021        INTERPRETATION:  CLINICAL INFORMATION: Hypoxia, Covid 19 infection, assess for pulmonary embolism    COMPARISON: Chest radiograph from same day    PROCEDURE:  CT Angiography of the Chest.  90 ml of Omnipaque 350 was injected intravenously. 10 ml were discarded.  Sagittal and coronal reformats were performed as well as 3D (MIP) reconstructions.    FINDINGS:    LUNGS AND AIRWAYS: Patent central airways.  Bilateral patchy groundglass and consolidative opacities with peripheral and lower lobe predominance.  PLEURA: No pleural effusion.  MEDIASTINUM AND KASIE: No lymphadenopathy.  VESSELS: No central, main, lobar, or segmental pulmonary embolism. Evaluation of the subsegmental pulmonary arteries is limited/nondiagnostic secondary to respiratory motion.  HEART: Heart size is normal. No pericardial effusion.  CHEST WALL AND LOWER NECK: Prominent right upper paratracheal lymph node may be reactive  VISUALIZED UPPER ABDOMEN: Small hiatal hernia.  BONES: Spinal degenerative changes.    IMPRESSION:  No central, main, lobar, or segmental pulmonary embolism. Evaluation of the subsegmental pulmonary arteries is limited/nondiagnostic secondary to respiratory motion.    Bilateral patchy groundglass and consolidative opacities with peripheral and lower lobe predominance, compatible with patient's known Covid 19 infection. Follow-up to resolution recommended.    TTE:

## 2021-03-09 NOTE — PROGRESS NOTE ADULT - ASSESSMENT
The patient is a 65y Male, hx of morbid obesity, HTN on lisinopril, complaining of shortness of breath. covid + dx 5d prior. no home O2 requirement, former smoker. has dyspnea at rest and on exertion. has not taken steroids, no hospitalization for covid. denies CP. [facility]. (27 Feb 2021 22:29)    ER vital:  T 98.4, P 178, /85.  Pt satting 85% on RA, placed on nasal cannula, advanced to NRB.  Covid pcr (+), covid serologies (-).   CT angio chest (-) for PE, (+) b/l patchy opacities.  Pt with elevated AST (>5x ULN but <10x) and ALT.   Pt started on remdesivir and dexamethasone.     Acute covid illness:    - Cont remdesivir and dexamethasone.  Monitor daily LFTs and renal function while pt on remdesivir.  LFTs elevated, possibly secondary to viral infection.  Currently ALT < 10x ULN, can cont remdesivir.  LFTs slowly improving.     - Monitor pulse ox, pt on supplemental O2 - now on Bipap    - Monitor inflammatory markers q72    DD:  248 <-- 213 <-- 282 <-- 343  ferritin: 1468 <-- 1636  pct: 0.58  crp: 10.3 <-- 10.9    - Pt on full dose lovenox for dvt/pe.  CT angio chest (-) for PE.   LE dopplers neg DVT    * Switched to Venturi mask,  NAD.  Repeat cxr 3/8 with continued b/l patchy opacities with slight worsening.  No indication for abx at this time.     Will follow,    Wandy Luke  [phone]

## 2021-03-09 NOTE — PROGRESS NOTE ADULT - SUBJECTIVE AND OBJECTIVE BOX
Patient is a 65y old  Male who presents with a chief complaint of acute hypoxemic respiratory failure (09 Mar 2021 13:36)      SUBJECTIVE / OVERNIGHT EVENTS: pulse ox 93-94% on 50% venti    MEDICATIONS  (STANDING):  diltiazem    Tablet 30 milliGRAM(s) Oral three times a day  enoxaparin Injectable 140 milliGRAM(s) SubCutaneous every 12 hours    MEDICATIONS  (PRN):  acetaminophen   Tablet .. 650 milliGRAM(s) Oral every 6 hours PRN Mild Pain (1 - 3)      Vital Signs Last 24 Hrs  T(F): 98.8 (03-09-21 @ 20:35), Max: 98.8 (03-09-21 @ 20:35)  HR: 84 (03-09-21 @ 20:35) (63 - 90)  BP: 118/76 (03-09-21 @ 20:35) (118/76 - 143/90)  RR: 18 (03-09-21 @ 20:35) (18 - 20)  SpO2: 94% (03-09-21 @ 20:35) (89% - 94%)  Telemetry:   CAPILLARY BLOOD GLUCOSE        I&O's Summary    08 Mar 2021 07:01  -  09 Mar 2021 07:00  --------------------------------------------------------  IN: 1260 mL / OUT: 1850 mL / NET: -590 mL    09 Mar 2021 07:01  -  09 Mar 2021 21:37  --------------------------------------------------------  IN: 240 mL / OUT: 1100 mL / NET: -860 mL        PHYSICAL EXAM:  GENERAL: NAD, well-developed  HEAD:  Atraumatic, Normocephalic  EYES: EOMI, PERRLA, conjunctiva and sclera clear  NECK: Supple, No JVD  CHEST/LUNG: Clear to auscultation bilaterally; No wheeze  HEART: Regular rate and rhythm; No murmurs, rubs, or gallops  ABDOMEN: Soft, Nontender, Nondistended; Bowel sounds present  EXTREMITIES:  2+ Peripheral Pulses, No clubbing, cyanosis, or edema  PSYCH: AAOx3  NEUROLOGY: non-focal  SKIN: No rashes or lesions    LABS:                        12.0   9.33  )-----------( 356      ( 09 Mar 2021 05:38 )             37.3     03-09    141  |  102  |  26<H>  ----------------------------<  79  4.1   |  30  |  0.94    Ca    8.7      09 Mar 2021 05:38    TPro  6.1  /  Alb  2.7<L>  /  TBili  0.5  /  DBili  0.1  /  AST  29  /  ALT  39  /  AlkPhos  46  03-08    PT/INR - ( 08 Mar 2021 08:18 )   PT: 14.1 sec;   INR: 1.19 ratio                   RADIOLOGY & ADDITIONAL TESTS:    Imaging Personally Reviewed:    Consultant(s) Notes Reviewed:      Care Discussed with Consultants/Other Providers:

## 2021-03-09 NOTE — PROGRESS NOTE ADULT - SUBJECTIVE AND OBJECTIVE BOX
Infectious Diseases progress note:    Subjective: Feels okay, has been on venturi mask.  Afebrile, no productive cough    ROS:  CONSTITUTIONAL:  No fever, chills, rigors  CARDIOVASCULAR:  No chest pain or palpitations  RESPIRATORY:   No SOB, cough, dyspnea on exertion.  No wheezing  GASTROINTESTINAL:  No abd pain, N/V, diarrhea/constipation  EXTREMITIES:  No swelling or joint pain  GENITOURINARY:  No burning on urination, increased frequency or urgency.  No flank pain  NEUROLOGIC:  No HA, visual disturbances  SKIN: No rashes    Allergies    Actifed (Headache)  Sudafed (Headache)    Intolerances        ANTIBIOTICS/RELEVANT:  antimicrobials    immunologic:    OTHER:  acetaminophen   Tablet .. 650 milliGRAM(s) Oral every 6 hours PRN  diltiazem    Tablet 30 milliGRAM(s) Oral three times a day  enoxaparin Injectable 140 milliGRAM(s) SubCutaneous every 12 hours      Objective:  Vital Signs Last 24 Hrs  T(C): 36.9 (10 Mar 2021 05:45), Max: 37.1 (09 Mar 2021 20:35)  T(F): 98.5 (10 Mar 2021 05:45), Max: 98.8 (09 Mar 2021 20:35)  HR: 87 (10 Mar 2021 05:45) (71 - 97)  BP: 120/80 (10 Mar 2021 05:45) (118/76 - 143/90)  BP(mean): --  RR: 18 (10 Mar 2021 05:45) (18 - 20)  SpO2: 91% (10 Mar 2021 05:45) (89% - 94%)    PHYSICAL EXAM:  Constitutional:NAD  Eyes:KRISTEN, EOMI  Ear/Nose/Throat: no thrush, mucositis.  Moist mucous membranes	  Neck:no JVD, no lymphadenopathy, supple  Respiratory: CTA berna  Cardiovascular: S1S2 RRR, no murmurs  Gastrointestinal:soft, nontender,  nondistended (+) BS  Extremities:no e/e/c  Skin:  no rashes, open wounds or ulcerations        LABS:                        13.4   8.55  )-----------( 299      ( 10 Mar 2021 06:48 )             42.1     03-10    139  |  100  |  18  ----------------------------<  74  3.9   |  27  |  0.96    Ca    8.9      10 Mar 2021 06:48    TPro  6.5  /  Alb  2.8<L>  /  TBili  0.4  /  DBili  x   /  AST  33  /  ALT  38  /  AlkPhos  51  03-10          Procalcitonin, Serum: 0.58 (02-27 @ 13:25)                    MICROBIOLOGY:    Culture - Blood (02.27.21 @ 18:02)   Specimen Source: .Blood Blood   Culture Results:   No Growth Final       RADIOLOGY & ADDITIONAL STUDIES:    < from: Xray Chest 1 View- PORTABLE-Routine (Xray Chest 1 View- PORTABLE-Routine .) (03.08.21 @ 15:49) >    Impression:    The heart is slightly enlarged. Airway opacities are seen in both lungs compatible with multifocal pneumonia such as seen in Covid 19. This appears to be slightly worsened when compared to previous study done March 1, 2021 at 5:49 PM.    < end of copied text >

## 2021-03-10 LAB
ALBUMIN SERPL ELPH-MCNC: 2.8 G/DL — LOW (ref 3.3–5)
ALP SERPL-CCNC: 51 U/L — SIGNIFICANT CHANGE UP (ref 40–120)
ALT FLD-CCNC: 38 U/L — SIGNIFICANT CHANGE UP (ref 10–45)
ANION GAP SERPL CALC-SCNC: 12 MMOL/L — SIGNIFICANT CHANGE UP (ref 5–17)
AST SERPL-CCNC: 33 U/L — SIGNIFICANT CHANGE UP (ref 10–40)
BILIRUB SERPL-MCNC: 0.4 MG/DL — SIGNIFICANT CHANGE UP (ref 0.2–1.2)
BUN SERPL-MCNC: 18 MG/DL — SIGNIFICANT CHANGE UP (ref 7–23)
CALCIUM SERPL-MCNC: 8.9 MG/DL — SIGNIFICANT CHANGE UP (ref 8.4–10.5)
CHLORIDE SERPL-SCNC: 100 MMOL/L — SIGNIFICANT CHANGE UP (ref 96–108)
CO2 SERPL-SCNC: 27 MMOL/L — SIGNIFICANT CHANGE UP (ref 22–31)
CREAT SERPL-MCNC: 0.96 MG/DL — SIGNIFICANT CHANGE UP (ref 0.5–1.3)
CRP SERPL-MCNC: 2.23 MG/DL — HIGH (ref 0–0.4)
D DIMER BLD IA.RAPID-MCNC: 359 NG/ML DDU — HIGH
FERRITIN SERPL-MCNC: 692 NG/ML — HIGH (ref 30–400)
GLUCOSE SERPL-MCNC: 74 MG/DL — SIGNIFICANT CHANGE UP (ref 70–99)
HCT VFR BLD CALC: 42.1 % — SIGNIFICANT CHANGE UP (ref 39–50)
HGB BLD-MCNC: 13.4 G/DL — SIGNIFICANT CHANGE UP (ref 13–17)
MCHC RBC-ENTMCNC: 29.3 PG — SIGNIFICANT CHANGE UP (ref 27–34)
MCHC RBC-ENTMCNC: 31.8 GM/DL — LOW (ref 32–36)
MCV RBC AUTO: 91.9 FL — SIGNIFICANT CHANGE UP (ref 80–100)
NRBC # BLD: 0 /100 WBCS — SIGNIFICANT CHANGE UP (ref 0–0)
PLATELET # BLD AUTO: 299 K/UL — SIGNIFICANT CHANGE UP (ref 150–400)
POTASSIUM SERPL-MCNC: 3.9 MMOL/L — SIGNIFICANT CHANGE UP (ref 3.5–5.3)
POTASSIUM SERPL-SCNC: 3.9 MMOL/L — SIGNIFICANT CHANGE UP (ref 3.5–5.3)
PROT SERPL-MCNC: 6.5 G/DL — SIGNIFICANT CHANGE UP (ref 6–8.3)
RBC # BLD: 4.58 M/UL — SIGNIFICANT CHANGE UP (ref 4.2–5.8)
RBC # FLD: 13.2 % — SIGNIFICANT CHANGE UP (ref 10.3–14.5)
SODIUM SERPL-SCNC: 139 MMOL/L — SIGNIFICANT CHANGE UP (ref 135–145)
WBC # BLD: 8.55 K/UL — SIGNIFICANT CHANGE UP (ref 3.8–10.5)
WBC # FLD AUTO: 8.55 K/UL — SIGNIFICANT CHANGE UP (ref 3.8–10.5)

## 2021-03-10 PROCEDURE — 93010 ELECTROCARDIOGRAM REPORT: CPT

## 2021-03-10 RX ORDER — DIGOXIN 250 MCG
500 TABLET ORAL ONCE
Refills: 0 | Status: COMPLETED | OUTPATIENT
Start: 2021-03-10 | End: 2021-03-10

## 2021-03-10 RX ORDER — DILTIAZEM HCL 120 MG
5 CAPSULE, EXT RELEASE 24 HR ORAL ONCE
Refills: 0 | Status: COMPLETED | OUTPATIENT
Start: 2021-03-10 | End: 2021-03-10

## 2021-03-10 RX ORDER — DILTIAZEM HCL 120 MG
5 CAPSULE, EXT RELEASE 24 HR ORAL
Qty: 125 | Refills: 0 | Status: DISCONTINUED | OUTPATIENT
Start: 2021-03-10 | End: 2021-03-10

## 2021-03-10 RX ORDER — DILTIAZEM HCL 120 MG
60 CAPSULE, EXT RELEASE 24 HR ORAL THREE TIMES A DAY
Refills: 0 | Status: DISCONTINUED | OUTPATIENT
Start: 2021-03-10 | End: 2021-03-13

## 2021-03-10 RX ADMIN — Medication 30 MILLIGRAM(S): at 06:30

## 2021-03-10 RX ADMIN — Medication 5 MILLIGRAM(S): at 18:10

## 2021-03-10 RX ADMIN — Medication 500 MICROGRAM(S): at 16:38

## 2021-03-10 RX ADMIN — ENOXAPARIN SODIUM 140 MILLIGRAM(S): 100 INJECTION SUBCUTANEOUS at 06:30

## 2021-03-10 RX ADMIN — Medication 5 MILLIGRAM(S): at 19:41

## 2021-03-10 RX ADMIN — ENOXAPARIN SODIUM 140 MILLIGRAM(S): 100 INJECTION SUBCUTANEOUS at 17:33

## 2021-03-10 RX ADMIN — Medication 30 MILLIGRAM(S): at 13:19

## 2021-03-10 RX ADMIN — Medication 60 MILLIGRAM(S): at 22:00

## 2021-03-10 NOTE — CONSULT NOTE ADULT - REASON FOR ADMISSION
acute hypoxemic respiratory failure

## 2021-03-10 NOTE — PROGRESS NOTE ADULT - ASSESSMENT
ACute hypoxemic respiratory failure  Obesity  Multilobar viral pneumonia due to COVID 19  Atrial Fibrillation: now back in NSR    REC    50% ventimask trial with target sat 88-90% - Tranistion to nasal cannula as tolerated  COntinue Lovenox  Mobilize OOB

## 2021-03-10 NOTE — CONSULT NOTE ADULT - SUBJECTIVE AND OBJECTIVE BOX
Patient seen and evaluated at bedside    Chief Complaint: COVID-19, Afib     HPI:  65y Male, hx of morbid obesity, HTN on lisinopril, complaining of shortness of breath COVID-19 positive s/p steroids and Remdesivir, hospital course complicated by new oxygen requirement and Afib w RVR. EP consulted for eval of Afib after pt noted to have RVR sustaining 170/180. Pt denies symptoms of chest pain, Worsening shortness of breath, palpitations, lightheadedness or dizziness. CXR 3/8 compared to 3/1 demonstrates slightly worsening multifocal PNA.    Pt currently prescribed Diltiazem 30 mg PO TID, S/P Diltiazem 5 mg IVP X 2, Digoxin 500 mcg IVP X 1. Pt is on Therapeutic dose Lovenox.     Telemetry: Afib 120-180, currently 120-140       PMHx:   HTN (hypertension)    PSHx: Denies     Allergies:  Actifed (Headache)  Sudafed (Headache)      Current Medications:   acetaminophen   Tablet .. 650 milliGRAM(s) Oral every 6 hours PRN  diltiazem    Tablet 60 milliGRAM(s) Oral three times a day  enoxaparin Injectable 140 milliGRAM(s) SubCutaneous every 12 hours      FAMILY HISTORY: Noncontributory       Social History:  Denies  toxic habits       REVIEW OF SYSTEMS:  CONSTITUTIONAL: No weakness, fevers or chills  EYES/ENT: No visual changes;  No dysphagia  NECK: No pain or stiffness  RESPIRATORY: No cough, wheezing, hemoptysis; + shortness of breath  CARDIOVASCULAR: No chest pain or palpitations; No lower extremity edema  GASTROINTESTINAL: No abdominal or epigastric pain.  BACK: No back pain  GENITOURINARY: No dysuria, frequency or hematuria  NEUROLOGICAL: No numbness or weakness  SKIN: No itching, burning, rashes, or lesions   All other review of systems is negative unless indicated above.    Physical Exam:  T(F): 97.3 (03-10), Max: 98.7 (03-10)  HR: 132 (03-10) (83 - 168)  BP: 106/63 (03-10) (98/68 - 120/80)  RR: 20 (03-10)  SpO2: 98% (03-10)    GENERAL: obese in NAD   HEAD:  Atraumatic, Normocephalic  ENT: Neck supple, No JVD, moist mucosa  CHEST/LUNG: Crackles lung bases b/l  BACK: No spinal tenderness  HEART: irreg irrge rate and rhythm; No murmurs, rubs, or gallops  ABDOMEN: Soft, Nontender, Nondistended; Bowel sounds present  EXTREMITIES:  No clubbing, cyanosis, or edema  PSYCH: Nl behavior, nl affect  NEUROLOGY: AAOx3, non-focal, cranial nerves intact  SKIN: Normal color, No rashes or lesions  LINES:    Cardiovascular Diagnostic Testing:    ECG: Afib w RVR     Echo:  < from: TTE with Doppler (w/Cont) (03.08.21 @ 19:54) >  Dimensions:    Normal Values:  LA:     3.7    2.0 - 4.0 cm  Ao:     3.2    2.0 - 3.8 cm  SEPTUM: 1.1    0.6 - 1.2 cm  PWT:    0.7    0.6 - 1.1 cm  LVIDd:  5.2    3.0 - 5.6 cm  LVIDs:  3.4    1.8 - 4.0 cm  Derived variables:  LVMI: 67 g/m2  RWT: 0.26  Fractional short: 35 %  EF (Visual Estimate): 50-55 %  EF (Dupont Rule): 55 %Doppler Peak Velocity (m/sec):  AoV=1.0  ------------------------------------------------------------------------  Observations:  Mitral Valve: Mitral annular calcification and calcified  mitral leaflets with normal diastolic opening.  Aortic Valve/Aorta: Calcified trileaflet aortic valve with  normal opening. Peak transaortic valve gradient equals 4 mm  Hg. Minimal aortic regurgitation.  Peak left ventricular  outflow tract gradient equals 1 mm Hg.  Aortic Root: 3.2 cm.  Left Atrium: LA volume index = 5 cc/m2.  Left Ventricle: Endocardial visualization enhanced with  intravenous injection of Ultrasonic Enhancing Agent  (Definity).  Mild segmental left ventricular systolic  dysfunction with overall preserved EF.  Hypokinesis of the  mid to distal anteroseptal wall and apex.  Normal left  ventricular internal dimensions and wall thicknesses.  Normal diastolic function  Right Heart: Normal right atrium. Normal right ventricular  size and function. Tricuspid valve not well visualized.  Mild tricuspid regurgitation. Pulmonic valve not well  visualized. Mild pulmonic regurgitation.  Pericardium/Pleura: Normal pericardium with no pericardial  effusion.  Hemodynamic: Estimated right atrial pressure is 8 mm Hg.  Estimated right ventricular systolic pressure equals 35 mm  Hg, assuming right atrial pressure equals 8 mm Hg,  consistent with borderline pulmonary hypertension.  ------------------------------------------------------------------------  Conclusions:  1. Mitral annular calcification and calcified mitral  leaflets with normal diastolic opening.  2. Calcified trileaflet aortic valve with normal opening.  3. Normal left ventricular internal dimensions and wall  thicknesses.  4. Endocardial visualization enhanced with intravenous  injection of Ultrasonic Enhancing Agent (Definity).  Mild  segmental left ventricular systolic dysfunction with  overall preserved EF.  Hypokinesis of the mid to distal  anteroseptal wall and apex.  5. Normal diastolic function  6. Normal right ventricular size and function.  *** No previous Echo exam.  ------------------------------------------------------------------------  Confirmed on  3/9/2021 - 08:05:29 by Lolis Danielle M.D.    < end of copied text >      Stress Testing:    Cath:    Imaging:    CXR: Personally reviewed    Labs: Personally reviewed                        13.4   8.55  )-----------( 299      ( 10 Mar 2021 06:48 )             42.1     03-10    139  |  100  |  18  ----------------------------<  74  3.9   |  27  |  0.96    Ca    8.9      10 Mar 2021 06:48    TPro  6.5  /  Alb  2.8<L>  /  TBili  0.4  /  DBili  x   /  AST  33  /  ALT  38  /  AlkPhos  51  03-10

## 2021-03-10 NOTE — CHART NOTE - NSCHARTNOTEFT_GEN_A_CORE
patient with Afib with - 160s on tele. Pt asymptomatic. Oxygen sats 87% on venturimask 50%, changed to NRB - pt refusing high flow nasal canula. Discussed with Dr. Moctezuma and given digoxin 0.5mg IVP x 1.     EKG- Afib with RVR    Vital Signs Last 24 Hrs  T(C): 36.9 (10 Mar 2021 15:25), Max: 37.1 (09 Mar 2021 20:35)  HR: 168 (10 Mar 2021 17:55) (83 - 168)  BP: 115/78 (10 Mar 2021 17:31) (98/68 - 120/80)  RR: 20 (10 Mar 2021 17:55) (18 - 20)  SpO2: 96% (10 Mar 2021 17:55) (89% - 96%)    HR remains 160s - cardizem 5mg IVP x 1     [id number] patient with Afib with - 160s on tele. Pt asymptomatic. Oxygen sats 87% on venturimask 50%, changed to NRB - pt refusing high flow nasal canula. Discussed with Dr. oMctezuma and given digoxin 0.5mg IVP x 1.     Echo - EF 50 -55%, Mild segmental left ventricular systolic dysfunction with overall preserved EF.  Hypokinesis of the mid to distal anteroseptal wall and apex - outpt w/u per Dr. Moctezuma in the setting of COVID  EKG- Afib with RVR    Vital Signs Last 24 Hrs  T(C): 36.9 (10 Mar 2021 15:25), Max: 37.1 (09 Mar 2021 20:35)  HR: 168 (10 Mar 2021 17:55) (83 - 168)  BP: 115/78 (10 Mar 2021 17:31) (98/68 - 120/80)  RR: 20 (10 Mar 2021 17:55) (18 - 20)  SpO2: 96% (10 Mar 2021 17:55) (89% - 96%)    HR remains 160s - cardizem 5mg IVP x 1     [id number]

## 2021-03-10 NOTE — PROGRESS NOTE ADULT - SUBJECTIVE AND OBJECTIVE BOX
Subjective: Patient seen and examined. No new events except as noted.   On Bipap   REVIEW OF SYSTEMS:    CONSTITUTIONAL: +weakness, fevers or chills  EYES/ENT: No visual changes;  No vertigo or throat pain   NECK: No pain or stiffness  RESPIRATORY: No cough, wheezing, hemoptysis; No shortness of breath  CARDIOVASCULAR: No chest pain or palpitations  GASTROINTESTINAL: No abdominal or epigastric pain. No nausea, vomiting, or hematemesis; No diarrhea or constipation. No melena or hematochezia.  GENITOURINARY: No dysuria, frequency or hematuria  NEUROLOGICAL: No numbness or weakness  SKIN: No itching, burning, rashes, or lesions   All other review of systems is negative unless indicated above.    MEDICATIONS:  MEDICATIONS  (STANDING):  diltiazem    Tablet 30 milliGRAM(s) Oral three times a day  enoxaparin Injectable 140 milliGRAM(s) SubCutaneous every 12 hours      PHYSICAL EXAM:  T(C): 36.9 (03-10-21 @ 05:45), Max: 37.1 (03-09-21 @ 20:35)  HR: 87 (03-10-21 @ 05:45) (71 - 97)  BP: 120/80 (03-10-21 @ 05:45) (118/76 - 143/90)  RR: 18 (03-10-21 @ 05:45) (18 - 20)  SpO2: 91% (03-10-21 @ 05:45) (89% - 94%)  Wt(kg): --  I&O's Summary    09 Mar 2021 07:01  -  10 Mar 2021 07:00  --------------------------------------------------------  IN: 240 mL / OUT: 1550 mL / NET: -1310 mL          Appearance: NAD  HEENT:Dry  oral mucosa, PERRL, EOMI	  Lymphatic: No lymphadenopathy , no edema  Cardiovascular: Normal S1 S2, No JVD, No murmurs , Peripheral pulses palpable 2+ bilaterally  Respiratory: Decreased bs   Gastrointestinal:  Soft, Non-tender, + BS	  Skin: No rashes, No ecchymoses, No cyanosis, warm to touch  Musculoskeletal: Normal range of motion, normal strength  Psychiatry:  Mood & affect appropriate  Ext: No edema      LABS:    CARDIAC MARKERS:                                13.4   8.55  )-----------( 299      ( 10 Mar 2021 06:48 )             42.1     03-10    139  |  100  |  18  ----------------------------<  74  3.9   |  27  |  0.96    Ca    8.9      10 Mar 2021 06:48    TPro  6.5  /  Alb  2.8<L>  /  TBili  0.4  /  DBili  x   /  AST  33  /  ALT  38  /  AlkPhos  51  03-10    proBNP:   Lipid Profile:   HgA1c:   TSH:             TELEMETRY: 	 SR    ECG:  	  RADIOLOGY:   DIAGNOSTIC TESTING:  [ ] Echocardiogram:  [ ]  Catheterization:  [ ] Stress Test:    OTHER:

## 2021-03-10 NOTE — PROGRESS NOTE ADULT - SUBJECTIVE AND OBJECTIVE BOX
Follow-up Pulm Progress Note  Francisco Ramon MD  [phone]    Tolerating 50% ventimask: sats 90-92%  Ambulating in room with PT: afebrile, alert, non toxic      Vital Signs Last 24 Hrs  T(C): 37.1 (10 Mar 2021 11:05), Max: 37.1 (09 Mar 2021 20:35)  T(F): 98.7 (10 Mar 2021 11:05), Max: 98.8 (09 Mar 2021 20:35)  HR: 83 (10 Mar 2021 13:25) (83 - 102)  BP: 110/69 (10 Mar 2021 11:05) (110/69 - 120/80)  BP(mean): --  RR: 18 (10 Mar 2021 11:28) (18 - 19)  SpO2: 91% (10 Mar 2021 13:25) (89% - 94%)                        13.4   8.55  )-----------( 299      ( 10 Mar 2021 06:48 )             42.1       03-10    139  |  100  |  18  ----------------------------<  74  3.9   |  27  |  0.96    Ca    8.9      10 Mar 2021 06:48    TPro  6.5  /  Alb  2.8<L>  /  TBili  0.4  /  DBili  x   /  AST  33  /  ALT  38  /  AlkPhos  51  03-10    CULTURES:  Culture Results:   No growth to date. (02-27 @ 18:02)  Culture Results:   No growth to date. (02-27 @ 18:02)    Most recent blood culture -- 02-27 @ 18:02   -- -- .Blood Blood 02-27 @ 18:02      Physical Examination:  PULM: No wheeze  CVS: Regular rate and rhythm, no murmurs, rubs, or gallops  ABD: Soft, non-tender  EXT:  No clubbing, cyanosis, or edema    RADIOLOGY REVIEWED  CXR:    CT chest:    PROCEDURE DATE:  02/27/2021        INTERPRETATION:  CLINICAL INFORMATION: Hypoxia, Covid 19 infection, assess for pulmonary embolism    COMPARISON: Chest radiograph from same day    PROCEDURE:  CT Angiography of the Chest.  90 ml of Omnipaque 350 was injected intravenously. 10 ml were discarded.  Sagittal and coronal reformats were performed as well as 3D (MIP) reconstructions.    FINDINGS:    LUNGS AND AIRWAYS: Patent central airways.  Bilateral patchy groundglass and consolidative opacities with peripheral and lower lobe predominance.  PLEURA: No pleural effusion.  MEDIASTINUM AND KASIE: No lymphadenopathy.  VESSELS: No central, main, lobar, or segmental pulmonary embolism. Evaluation of the subsegmental pulmonary arteries is limited/nondiagnostic secondary to respiratory motion.  HEART: Heart size is normal. No pericardial effusion.  CHEST WALL AND LOWER NECK: Prominent right upper paratracheal lymph node may be reactive  VISUALIZED UPPER ABDOMEN: Small hiatal hernia.  BONES: Spinal degenerative changes.    IMPRESSION:  No central, main, lobar, or segmental pulmonary embolism. Evaluation of the subsegmental pulmonary arteries is limited/nondiagnostic secondary to respiratory motion.    Bilateral patchy groundglass and consolidative opacities with peripheral and lower lobe predominance, compatible with patient's known Covid 19 infection. Follow-up to resolution recommended.    TTE:

## 2021-03-10 NOTE — PROGRESS NOTE ADULT - SUBJECTIVE AND OBJECTIVE BOX
Patient is a 65y old  Male who presents with a chief complaint of acute hypoxemic respiratory failure (10 Mar 2021 13:56)      SUBJECTIVE / OVERNIGHT EVENTS: back in rapid afib, on NRB, not responding to Dig, will call EPS consult w dr brewster    MEDICATIONS  (STANDING):  diltiazem    Tablet 30 milliGRAM(s) Oral three times a day  enoxaparin Injectable 140 milliGRAM(s) SubCutaneous every 12 hours    MEDICATIONS  (PRN):  acetaminophen   Tablet .. 650 milliGRAM(s) Oral every 6 hours PRN Mild Pain (1 - 3)      Vital Signs Last 24 Hrs  T(F): 97.3 (03-10-21 @ 19:36), Max: 98.8 (03-09-21 @ 20:35)  HR: 150 (03-10-21 @ 20:20) (83 - 168)  BP: 110/62 (03-10-21 @ 19:36) (98/68 - 120/80)  RR: 20 (03-10-21 @ 19:36) (18 - 20)  SpO2: 98% (03-10-21 @ 19:36) (89% - 98%)  Telemetry:   CAPILLARY BLOOD GLUCOSE        I&O's Summary    09 Mar 2021 07:01  -  10 Mar 2021 07:00  --------------------------------------------------------  IN: 240 mL / OUT: 1550 mL / NET: -1310 mL    10 Mar 2021 07:01  -  10 Mar 2021 20:25  --------------------------------------------------------  IN: 1030 mL / OUT: 1000 mL / NET: 30 mL        PHYSICAL EXAM:  GENERAL: NAD, well-developed  HEAD:  Atraumatic, Normocephalic  EYES: EOMI, PERRLA, conjunctiva and sclera clear  NECK: Supple, No JVD  CHEST/LUNG: Clear to auscultation bilaterally; No wheeze  HEART: Regular rate and rhythm; No murmurs, rubs, or gallops  ABDOMEN: Soft, Nontender, Nondistended; Bowel sounds present  EXTREMITIES:  2+ Peripheral Pulses, No clubbing, cyanosis, or edema  PSYCH: AAOx3  NEUROLOGY: non-focal  SKIN: No rashes or lesions    LABS:                        13.4   8.55  )-----------( 299      ( 10 Mar 2021 06:48 )             42.1     03-10    139  |  100  |  18  ----------------------------<  74  3.9   |  27  |  0.96    Ca    8.9      10 Mar 2021 06:48    TPro  6.5  /  Alb  2.8<L>  /  TBili  0.4  /  DBili  x   /  AST  33  /  ALT  38  /  AlkPhos  51  03-10              RADIOLOGY & ADDITIONAL TESTS:    Imaging Personally Reviewed:    Consultant(s) Notes Reviewed:      Care Discussed with Consultants/Other Providers:

## 2021-03-10 NOTE — CONSULT NOTE ADULT - ASSESSMENT
65 with covid-19, hypoxic resp failure
65y Male, hx of morbid obesity, HTN on lisinopril, complaining of shortness of breath. covid + dx 5d prior. no home O2 requirement, former smoker. has dyspnea at rest and on exertion. has not taken steroids, no hospitalization for covid. denies CP. [facility].  Converted to Afib RVR last night   + history of Afib. no previous stroke   no cp or palpitations   + SOB and cough 
65y Male, hx of morbid obesity, HTN on lisinopril, complaining of shortness of breath COVID-19 positive s/p steroids and Remdesivir, hospital course complicated by new oxygen requirement and Afib w RVR. Ep consulted for further management.    Afib w RVR   Likely driven by acute illness   Pt largely without symptoms independent of SOB related to COVID-19 PNA   s/p Digoxin and Diltiazem pushes this afternoon/evening. Recc increasing Diltiazem to 60 mg TID. If poor response would consider Digoxin load   Would not use Amiodarone as pt not previously on long term AC   Elevated [id number]: Agree with therapeutic AC   CXR with worsening multifocal PNA vs edema; reluctant to use BB given supplemental oxygen requirement, but may be an option if clinically stabilizes    Anand Braxton MD  Cardiology Fellow  [phone]    Please check [email or web address] for cardiology service schedule and contact information.   
The patient is a 65y Male, hx of morbid obesity, HTN on lisinopril, complaining of shortness of breath. covid + dx 5d prior. no home O2 requirement, former smoker. has dyspnea at rest and on exertion. has not taken steroids, no hospitalization for covid. denies CP. [facility]. (27 Feb 2021 22:29)    ER vital:  T 98.4, P 178, /85.  Pt satting 85% on RA, placed on nasal cannula, advanced to NRB.  Covid pcr (+), covid serologies (-).   CT angio chest (-) for PE, (+) b/l patchy opacities.  Pt with elevated AST (>5x ULN but <10x) and ALT.   Pt started on remdesivir and dexamethasone.     Acute covid illness:    - Cont remdesivir and dexamethasone.  Monitor daily LFTs and renal function while pt on remdesivir.  LFTs elevated, possibly secondary to viral infection.  Currently ALT < 10x ULN, can cont remdesivir    - Monitor pulse ox, pt on supplemental O2    - Monitor inflammatory markers q72    DD: 343  ferritin: 1636  pct: 0.58  crp: 10.9    - Pt on full dose lovenox for dvt/pe.  CT angio chest (-) for PE.      Will follow,    Wandy Luke  [phone]

## 2021-03-11 PROBLEM — Z00.00 ENCOUNTER FOR PREVENTIVE HEALTH EXAMINATION: Status: ACTIVE | Noted: 2021-03-11

## 2021-03-11 LAB
ANION GAP SERPL CALC-SCNC: 15 MMOL/L — SIGNIFICANT CHANGE UP (ref 5–17)
BUN SERPL-MCNC: 18 MG/DL — SIGNIFICANT CHANGE UP (ref 7–23)
CALCIUM SERPL-MCNC: 9 MG/DL — SIGNIFICANT CHANGE UP (ref 8.4–10.5)
CHLORIDE SERPL-SCNC: 102 MMOL/L — SIGNIFICANT CHANGE UP (ref 96–108)
CO2 SERPL-SCNC: 26 MMOL/L — SIGNIFICANT CHANGE UP (ref 22–31)
CREAT SERPL-MCNC: 1.08 MG/DL — SIGNIFICANT CHANGE UP (ref 0.5–1.3)
GLUCOSE SERPL-MCNC: 81 MG/DL — SIGNIFICANT CHANGE UP (ref 70–99)
HCT VFR BLD CALC: 42.4 % — SIGNIFICANT CHANGE UP (ref 39–50)
HGB BLD-MCNC: 13.9 G/DL — SIGNIFICANT CHANGE UP (ref 13–17)
MAGNESIUM SERPL-MCNC: 2.2 MG/DL — SIGNIFICANT CHANGE UP (ref 1.6–2.6)
MCHC RBC-ENTMCNC: 30 PG — SIGNIFICANT CHANGE UP (ref 27–34)
MCHC RBC-ENTMCNC: 32.8 GM/DL — SIGNIFICANT CHANGE UP (ref 32–36)
MCV RBC AUTO: 91.6 FL — SIGNIFICANT CHANGE UP (ref 80–100)
NRBC # BLD: 0 /100 WBCS — SIGNIFICANT CHANGE UP (ref 0–0)
PLATELET # BLD AUTO: 287 K/UL — SIGNIFICANT CHANGE UP (ref 150–400)
POTASSIUM SERPL-MCNC: 3.7 MMOL/L — SIGNIFICANT CHANGE UP (ref 3.5–5.3)
POTASSIUM SERPL-SCNC: 3.7 MMOL/L — SIGNIFICANT CHANGE UP (ref 3.5–5.3)
RBC # BLD: 4.63 M/UL — SIGNIFICANT CHANGE UP (ref 4.2–5.8)
RBC # FLD: 13.3 % — SIGNIFICANT CHANGE UP (ref 10.3–14.5)
SODIUM SERPL-SCNC: 143 MMOL/L — SIGNIFICANT CHANGE UP (ref 135–145)
WBC # BLD: 6.54 K/UL — SIGNIFICANT CHANGE UP (ref 3.8–10.5)
WBC # FLD AUTO: 6.54 K/UL — SIGNIFICANT CHANGE UP (ref 3.8–10.5)

## 2021-03-11 PROCEDURE — 93010 ELECTROCARDIOGRAM REPORT: CPT

## 2021-03-11 RX ORDER — POTASSIUM CHLORIDE 20 MEQ
10 PACKET (EA) ORAL ONCE
Refills: 0 | Status: COMPLETED | OUTPATIENT
Start: 2021-03-11 | End: 2021-03-11

## 2021-03-11 RX ADMIN — Medication 60 MILLIGRAM(S): at 21:06

## 2021-03-11 RX ADMIN — ENOXAPARIN SODIUM 140 MILLIGRAM(S): 100 INJECTION SUBCUTANEOUS at 18:03

## 2021-03-11 RX ADMIN — ENOXAPARIN SODIUM 140 MILLIGRAM(S): 100 INJECTION SUBCUTANEOUS at 05:08

## 2021-03-11 RX ADMIN — Medication 60 MILLIGRAM(S): at 05:08

## 2021-03-11 RX ADMIN — Medication 60 MILLIGRAM(S): at 13:39

## 2021-03-11 RX ADMIN — Medication 10 MILLIEQUIVALENT(S): at 18:03

## 2021-03-11 NOTE — PROGRESS NOTE ADULT - SUBJECTIVE AND OBJECTIVE BOX
24H hour events: Pt previously c/o SOB on NRM in setting of COVID PNA. No new acute complaints.   Overnight Tele: AFib w/ RVR, rates 100-180s, high 200 bpm.       MEDICATIONS:  diltiazem    Tablet 60 milliGRAM(s) Oral three times a day  enoxaparin Injectable 140 milliGRAM(s) SubCutaneous every 12 hours  acetaminophen   Tablet .. 650 milliGRAM(s) Oral every 6 hours PRN      REVIEW OF SYSTEMS:  See HPI, otherwise ROS negative.    PHYSICAL EXAM:  T(C): 36.8 (03-11-21 @ 10:55), Max: 36.9 (03-10-21 @ 15:25)  HR: 80 (03-11-21 @ 10:55) (80 - 168)  BP: 124/80 (03-11-21 @ 10:55) (98/68 - 124/80)  RR: 17 (03-11-21 @ 10:55) (17 - 20)  SpO2: 97% (03-11-21 @ 10:55) (90% - 99%)  Wt(kg): --  I&O's Summary    10 Mar 2021 07:01  -  11 Mar 2021 07:00  --------------------------------------------------------  IN: 1630 mL / OUT: 3200 mL / NET: -1570 mL      EXAM: As per primary team    LABS:	 	    CBC Full  -  ( 11 Mar 2021 06:42 )  WBC Count : 6.54 K/uL  Hemoglobin : 13.9 g/dL  Hematocrit : 42.4 %  Platelet Count - Automated : 287 K/uL  Mean Cell Volume : 91.6 fl  Mean Cell Hemoglobin : 30.0 pg  Mean Cell Hemoglobin Concentration : 32.8 gm/dL  Auto Neutrophil # : x  Auto Lymphocyte # : x  Auto Monocyte # : x  Auto Eosinophil # : x  Auto Basophil # : x  Auto Neutrophil % : x  Auto Lymphocyte % : x  Auto Monocyte % : x  Auto Eosinophil % : x  Auto Basophil % : x    03-11    143  |  102  |  18  ----------------------------<  81  3.7   |  26  |  1.08  03-10    139  |  100  |  18  ----------------------------<  74  3.9   |  27  |  0.96    Ca    9.0      11 Mar 2021 07:29  Ca    8.9      10 Mar 2021 06:48  Mg     2.2     03-11    TPro  6.5  /  Alb  2.8<L>  /  TBili  0.4  /  DBili  x   /  AST  33  /  ALT  38  /  AlkPhos  51  03-10    TELEMETRY: AFib w/ RVR, rates 100-150s, highest rate 180bpm    ECG: personally reviewed   24H hour events: Pt previously c/o SOB on NRM in setting of COVID PNA. No new acute complaints.   Overnight Tele: AFib w/ RVR, rates 100-180s, high 200 bpm.       MEDICATIONS:  diltiazem    Tablet 60 milliGRAM(s) Oral three times a day  enoxaparin Injectable 140 milliGRAM(s) SubCutaneous every 12 hours  acetaminophen   Tablet .. 650 milliGRAM(s) Oral every 6 hours PRN      REVIEW OF SYSTEMS:  See HPI, otherwise ROS negative.    PHYSICAL EXAM:  T(C): 36.8 (03-11-21 @ 10:55), Max: 36.9 (03-10-21 @ 15:25)  HR: 80 (03-11-21 @ 10:55) (80 - 168)  BP: 124/80 (03-11-21 @ 10:55) (98/68 - 124/80)  RR: 17 (03-11-21 @ 10:55) (17 - 20)  SpO2: 97% (03-11-21 @ 10:55) (90% - 99%)  Wt(kg): --  I&O's Summary    10 Mar 2021 07:01  -  11 Mar 2021 07:00  --------------------------------------------------------  IN: 1630 mL / OUT: 3200 mL / NET: -1570 mL      EXAM: As per primary team    LABS:	 	    CBC Full  -  ( 11 Mar 2021 06:42 )  WBC Count : 6.54 K/uL  Hemoglobin : 13.9 g/dL  Hematocrit : 42.4 %  Platelet Count - Automated : 287 K/uL  Mean Cell Volume : 91.6 fl  Mean Cell Hemoglobin : 30.0 pg  Mean Cell Hemoglobin Concentration : 32.8 gm/dL  Auto Neutrophil # : x  Auto Lymphocyte # : x  Auto Monocyte # : x  Auto Eosinophil # : x  Auto Basophil # : x  Auto Neutrophil % : x  Auto Lymphocyte % : x  Auto Monocyte % : x  Auto Eosinophil % : x  Auto Basophil % : x    03-11    143  |  102  |  18  ----------------------------<  81  3.7   |  26  |  1.08  03-10    139  |  100  |  18  ----------------------------<  74  3.9   |  27  |  0.96    Ca    9.0      11 Mar 2021 07:29  Ca    8.9      10 Mar 2021 06:48  Mg     2.2     03-11    TPro  6.5  /  Alb  2.8<L>  /  TBili  0.4  /  DBili  x   /  AST  33  /  ALT  38  /  AlkPhos  51  03-10    TELEMETRY: AFib w/ RVR, rates 100-150s, highest rate 180bpm  3/11 @12:00 - NSR 60-90s    ECG: personally reviewed

## 2021-03-11 NOTE — PROGRESS NOTE ADULT - ASSESSMENT
ACute hypoxemic respiratory failure  Obesity  Multilobar viral pneumonia due to COVID 19  Atrial Fibrillation: now back in NSR    REC    Suspect significant component of hypoxemia due to obesity with basilar atelectasis  Taper HiFlo rapidly as tolerated to target sat 88-90% with transition to nasal O2 conventional as tolerated  Continue Lovenox  Mobilize OOB

## 2021-03-11 NOTE — CHART NOTE - NSCHARTNOTEFT_GEN_A_CORE
[facility] MEDICINE NOTE:    RN notified pt continues to be in A fib with RVR in 150'-160's. Pt asymptomatic with stable VS.   Discussed with Dr. Braxton ( hospital cards) and recommended to increase Cardizem from 30 to 60mg TID.    is aware.     Cardizem 60mg given at 10PM, HR decreased to less than 120.     2:10AM RN notified pt walked to the restroom and since then his HR is sustaining at 150's for the past 15 minutes.       Vital Signs Last 24 Hrs  T(C): 36.3 (10 Mar 2021 19:36), Max: 37.1 (10 Mar 2021 11:05)  T(F): 97.3 (10 Mar 2021 19:36), Max: 98.7 (10 Mar 2021 11:05)  HR: 132 (10 Mar 2021 21:59) (83 - 168)  BP: 106/63 (10 Mar 2021 21:59) (98/68 - 120/80)  BP(mean): --  RR: 20 (10 Mar 2021 21:59) (18 - 20)  SpO2: 98% (10 Mar 2021 21:59) (89% - 99%) [place] MEDICINE NOTE:    RN notified pt continues to be in A fib with RVR in 150'-160's. Pt asymptomatic with stable VS.   Discussed with Dr. Braxton ( hospital cards) and recommended to increase Cardizem from 30 to 60mg TID.    is aware.     Cardizem 60mg given at 10PM, HR decreased to less than 120.     2:10AM RN notified pt walked to the restroom and since then his HR was sustaining at 150's for the past 15 minutes.   Followed up with tele again and pt is currently not sustaining and HR ranges from 100- 140's (not sustaining).       Vital Signs Last 24 Hrs  T(C): 36.3 (10 Mar 2021 19:36), Max: 37.1 (10 Mar 2021 11:05)  T(F): 97.3 (10 Mar 2021 19:36), Max: 98.7 (10 Mar 2021 11:05)  HR: 132 (10 Mar 2021 21:59) (83 - 168)  BP: 106/63 (10 Mar 2021 21:59) (98/68 - 120/80)  RR: 20 (10 Mar 2021 21:59) (18 - 20)  SpO2: 98% (10 Mar 2021 21:59) (89% - 99%)    Hua White  Dept of Medicine [place] MEDICINE NOTE:    RN notified pt continues to be in A fib with RVR in 150'-160's. Pt asymptomatic with stable VS.   Discussed with Dr. Braxton ( hospital cards) and recommended to increase Cardizem from 30 to 60mg TID.    is aware.     Cardizem 60mg given at 10PM, HR decreased to less than 120.     2:10AM RN notified pt walked to the restroom and since then his HR was sustaining at 150's for the past 15 minutes.   Followed up with tele again and pt is currently not sustaining and HR ranges from 100- 140's.     EKG was done- showed A fib with no acute changes. Discussed w/ Dr. Braxton and recommended no other treatment, give AM dose of Cardizem.   Will endorse to AM team.      Vital Signs Last 24 Hrs  T(C): 36.3 (10 Mar 2021 19:36), Max: 37.1 (10 Mar 2021 11:05)  T(F): 97.3 (10 Mar 2021 19:36), Max: 98.7 (10 Mar 2021 11:05)  HR: 132 (10 Mar 2021 21:59) (83 - 168)  BP: 106/63 (10 Mar 2021 21:59) (98/68 - 120/80)  RR: 20 (10 Mar 2021 21:59) (18 - 20)  SpO2: 98% (10 Mar 2021 21:59) (89% - 99%)    Hua White  Dept of Medicine [place] MEDICINE NOTE:    RN notified pt continues to be in A fib with RVR in 150'-160's. Pt asymptomatic (denies chest pain or palpitation) with stable VS.   Discussed with Dr. Braxton ( hospital cards) and recommended to increase Cardizem from 30 to 60mg TID.    is aware.     Cardizem 60mg given at 10PM, HR decreased to less than 120.     2:10AM RN notified pt walked to the restroom and since then his HR was sustaining at 150's for the past 15 minutes.   Followed up with tele again and pt is currently not sustaining and HR ranges from 100- 140's.     EKG was done- showed A fib with no acute changes. Discussed w/ Dr. Braxton and recommended no other treatment, give AM dose of Cardizem.   Will endorse to AM team.      Vital Signs Last 24 Hrs  T(C): 36.3 (10 Mar 2021 19:36), Max: 37.1 (10 Mar 2021 11:05)  T(F): 97.3 (10 Mar 2021 19:36), Max: 98.7 (10 Mar 2021 11:05)  HR: 132 (10 Mar 2021 21:59) (83 - 168)  BP: 106/63 (10 Mar 2021 21:59) (98/68 - 120/80)  RR: 20 (10 Mar 2021 21:59) (18 - 20)  SpO2: 98% (10 Mar 2021 21:59) (89% - 99%)    Hua White  Dept of Medicine

## 2021-03-11 NOTE — PROGRESS NOTE ADULT - ASSESSMENT
The patient is a 65y Male, hx of morbid obesity, HTN on lisinopril, complaining of shortness of breath. covid + dx 5d prior. no home O2 requirement, former smoker. has dyspnea at rest and on exertion. has not taken steroids, no hospitalization for covid. denies CP. [facility] (27 Feb 2021 22:29)    ER vital:  T 98.4, P 178, /85.  Pt satting 85% on RA, placed on nasal cannula, advanced to NRB.  Covid pcr (+), covid serologies (-).   CT angio chest (-) for PE, (+) b/l patchy opacities.  Pt with elevated AST (>5x ULN but <10x) and ALT.   Pt started on remdesivir and dexamethasone.     Acute covid illness:    - Cont remdesivir and dexamethasone.  Monitor daily LFTs and renal function while pt on remdesivir.  LFTs elevated, possibly secondary to viral infection.  Currently ALT < 10x ULN, can cont remdesivir.  LFTs slowly improving.     - Monitor pulse ox, pt on supplemental O2 - now on Bipap    - Monitor inflammatory markers q72    DD:  359 <-- 248 <-- 213 <-- 282 <-- 343  ferritin: 692 <-- 1468 <-- 1636  pct:  0.58  crp: 2.2 <-- 10.3 <-- 10.9    - Pt on full dose lovenox for dvt/pe.  CT angio chest (-) for PE.   LE dopplers neg DVT    *  Repeat cxr 3/8 with continued b/l patchy opacities with slight worsening.  No indication for abx at this time.  Pt on NRB.     Will follow,    Wandy Luke  [phone]

## 2021-03-11 NOTE — PROGRESS NOTE ADULT - SUBJECTIVE AND OBJECTIVE BOX
Patient is a 65y old  Male who presents with a chief complaint of acute hypoxemic respiratory failure (11 Mar 2021 19:39)      SUBJECTIVE / OVERNIGHT EVENTS:    MEDICATIONS  (STANDING):  diltiazem    Tablet 60 milliGRAM(s) Oral three times a day  enoxaparin Injectable 140 milliGRAM(s) SubCutaneous every 12 hours    MEDICATIONS  (PRN):  acetaminophen   Tablet .. 650 milliGRAM(s) Oral every 6 hours PRN Mild Pain (1 - 3)      Vital Signs Last 24 Hrs  T(F): 96.8 (03-11-21 @ 20:31), Max: 98.2 (03-11-21 @ 10:55)  HR: 86 (03-11-21 @ 22:15) (80 - 165)  BP: 108/72 (03-11-21 @ 20:31) (108/72 - 124/80)  RR: 19 (03-11-21 @ 22:15) (17 - 19)  SpO2: 98% (03-11-21 @ 22:15) (92% - 98%)  Telemetry:   CAPILLARY BLOOD GLUCOSE        I&O's Summary    10 Mar 2021 07:01  -  11 Mar 2021 07:00  --------------------------------------------------------  IN: 1630 mL / OUT: 3200 mL / NET: -1570 mL    11 Mar 2021 07:01  -  11 Mar 2021 23:17  --------------------------------------------------------  IN: 2760 mL / OUT: 2300 mL / NET: 460 mL        PHYSICAL EXAM:  GENERAL: NAD, well-developed  HEAD:  Atraumatic, Normocephalic  EYES: EOMI, PERRLA, conjunctiva and sclera clear  NECK: Supple, No JVD  CHEST/LUNG: Clear to auscultation bilaterally; No wheeze  HEART: Regular rate and rhythm; No murmurs, rubs, or gallops  ABDOMEN: Soft, Nontender, Nondistended; Bowel sounds present  EXTREMITIES:  2+ Peripheral Pulses, No clubbing, cyanosis, or edema  PSYCH: AAOx3  NEUROLOGY: non-focal  SKIN: No rashes or lesions    LABS:                        13.9   6.54  )-----------( 287      ( 11 Mar 2021 06:42 )             42.4     03-11    143  |  102  |  18  ----------------------------<  81  3.7   |  26  |  1.08    Ca    9.0      11 Mar 2021 07:29  Mg     2.2     03-11    TPro  6.5  /  Alb  2.8<L>  /  TBili  0.4  /  DBili  x   /  AST  33  /  ALT  38  /  AlkPhos  51  03-10              RADIOLOGY & ADDITIONAL TESTS:    Imaging Personally Reviewed:    Consultant(s) Notes Reviewed:      Care Discussed with Consultants/Other Providers:   Patient is a 65y old  Male who presents with a chief complaint of acute hypoxemic respiratory failure (11 Mar 2021 19:39)      SUBJECTIVE / OVERNIGHT EVENTS: remains on 100% NRB    MEDICATIONS  (STANDING):  diltiazem    Tablet 60 milliGRAM(s) Oral three times a day  enoxaparin Injectable 140 milliGRAM(s) SubCutaneous every 12 hours    MEDICATIONS  (PRN):  acetaminophen   Tablet .. 650 milliGRAM(s) Oral every 6 hours PRN Mild Pain (1 - 3)      Vital Signs Last 24 Hrs  T(F): 96.8 (03-11-21 @ 20:31), Max: 98.2 (03-11-21 @ 10:55)  HR: 86 (03-11-21 @ 22:15) (80 - 165)  BP: 108/72 (03-11-21 @ 20:31) (108/72 - 124/80)  RR: 19 (03-11-21 @ 22:15) (17 - 19)  SpO2: 98% (03-11-21 @ 22:15) (92% - 98%)  Telemetry:   CAPILLARY BLOOD GLUCOSE        I&O's Summary    10 Mar 2021 07:01  -  11 Mar 2021 07:00  --------------------------------------------------------  IN: 1630 mL / OUT: 3200 mL / NET: -1570 mL    11 Mar 2021 07:01  -  11 Mar 2021 23:17  --------------------------------------------------------  IN: 2760 mL / OUT: 2300 mL / NET: 460 mL        PHYSICAL EXAM:  GENERAL: NAD, well-developed  HEAD:  Atraumatic, Normocephalic  EYES: EOMI, PERRLA, conjunctiva and sclera clear  NECK: Supple, No JVD  CHEST/LUNG: Clear to auscultation bilaterally; No wheeze  HEART: Regular rate and rhythm; No murmurs, rubs, or gallops  ABDOMEN: Soft, Nontender, Nondistended; Bowel sounds present  EXTREMITIES:  2+ Peripheral Pulses, No clubbing, cyanosis, or edema  PSYCH: AAOx3  NEUROLOGY: non-focal  SKIN: No rashes or lesions    LABS:                        13.9   6.54  )-----------( 287      ( 11 Mar 2021 06:42 )             42.4     03-11    143  |  102  |  18  ----------------------------<  81  3.7   |  26  |  1.08    Ca    9.0      11 Mar 2021 07:29  Mg     2.2     03-11    TPro  6.5  /  Alb  2.8<L>  /  TBili  0.4  /  DBili  x   /  AST  33  /  ALT  38  /  AlkPhos  51  03-10              RADIOLOGY & ADDITIONAL TESTS:    Imaging Personally Reviewed:    Consultant(s) Notes Reviewed:      Care Discussed with Consultants/Other Providers:

## 2021-03-11 NOTE — PROGRESS NOTE ADULT - SUBJECTIVE AND OBJECTIVE BOX
Subjective: Patient seen and examined. No new events except as noted.   remains in AF RVR   On NRB   no symptoms      REVIEW OF SYSTEMS:    CONSTITUTIONAL: + weakness, fevers or chills  EYES/ENT: No visual changes;  No vertigo or throat pain   NECK: No pain or stiffness  RESPIRATORY: No cough, wheezing, hemoptysis; No shortness of breath  CARDIOVASCULAR: No chest pain or palpitations  GASTROINTESTINAL: No abdominal or epigastric pain. No nausea, vomiting, or hematemesis; No diarrhea or constipation. No melena or hematochezia.  GENITOURINARY: No dysuria, frequency or hematuria  NEUROLOGICAL: No numbness or weakness  SKIN: No itching, burning, rashes, or lesions   All other review of systems is negative unless indicated above.    MEDICATIONS:  MEDICATIONS  (STANDING):  diltiazem    Tablet 60 milliGRAM(s) Oral three times a day  enoxaparin Injectable 140 milliGRAM(s) SubCutaneous every 12 hours      PHYSICAL EXAM:  T(C): 36.8 (03-11-21 @ 10:55), Max: 36.9 (03-10-21 @ 15:25)  HR: 80 (03-11-21 @ 10:55) (80 - 168)  BP: 124/80 (03-11-21 @ 10:55) (98/68 - 124/80)  RR: 17 (03-11-21 @ 10:55) (17 - 20)  SpO2: 97% (03-11-21 @ 10:55) (90% - 99%)  Wt(kg): --  I&O's Summary    10 Mar 2021 07:01  -  11 Mar 2021 07:00  --------------------------------------------------------  IN: 1630 mL / OUT: 3200 mL / NET: -1570 mL          Appearance: NAD NRB   HEENT:   Normal oral mucosa, PERRL, EOMI	  Lymphatic: No lymphadenopathy , no edema  Cardiovascular: Irregular tachy   S1 S2, No JVD, No murmurs , Peripheral pulses palpable 2+ bilaterally  Respiratory: Lungs clear to auscultation, normal effort 	  Gastrointestinal:  Soft, Non-tender, + BS	  Skin: No rashes, No ecchymoses, No cyanosis, warm to touch  Musculoskeletal: Normal range of motion, normal strength  Psychiatry:  Mood & affect appropriate  Ext: No edema      LABS:    CARDIAC MARKERS:                                13.9   6.54  )-----------( 287      ( 11 Mar 2021 06:42 )             42.4     03-11    143  |  102  |  18  ----------------------------<  81  3.7   |  26  |  1.08    Ca    9.0      11 Mar 2021 07:29  Mg     2.2     03-11    TPro  6.5  /  Alb  2.8<L>  /  TBili  0.4  /  DBili  x   /  AST  33  /  ALT  38  /  AlkPhos  51  03-10    proBNP:   Lipid Profile:   HgA1c:   TSH:             TELEMETRY: 	  AF 130s   ECG:  	  RADIOLOGY:   DIAGNOSTIC TESTING:  [ ] Echocardiogram:  [ ]  Catheterization:  [ ] Stress Test:    OTHER:

## 2021-03-11 NOTE — PROGRESS NOTE ADULT - ASSESSMENT
65y Male, hx of morbid obesity, HTN on lisinopril, complaining of shortness of breath COVID-19 positive s/p steroids and Remdesivir, hospital course complicated by new oxygen requirement and Afib w RVR. Ep consulted for further management.    1. AFib w/ RVR    --INCOMPLETE 65y Male, hx of morbid obesity, HTN on lisinopril, complaining of shortness of breath COVID-19 positive s/p steroids and Remdesivir, hospital course complicated by new oxygen requirement and Afib w RVR. Ep consulted for further management.    1. AFib w/ RVR    -- Pt now in NSR 60s-90s s/p 60mg Diltiazem.   DEBORAH Arroyo PA-C  [phone] (former [id number]) 65y Male, hx of morbid obesity, HTN on lisinopril, complaining of shortness of breath COVID-19 positive s/p steroids and Remdesivir, hospital course complicated by new oxygen requirement and Afib w RVR. Ep consulted for further management. Pt self-converted to NSR this afternoon.     1. AFib w/ RVR    -- Pt now in NSR 60s-90s s/p 60mg Diltiazem.   -- Consider switching lovenox to oral AC, due to [id number] score 1 and length of time in AFib.  -- Consider antiarrythmic to maintain NSR.      DEBORAH Arroyo PA-C  [phone] (former [id number]) 65y Male, hx of morbid obesity, HTN on lisinopril, complaining of shortness of breath COVID-19 positive s/p steroids and Remdesivir, hospital course complicated by new oxygen requirement and Afib w RVR. Ep consulted for further management. Pt self-converted to NSR 60s-90s this afternoon.     1. AFib w/ RVR    -- Pt now in NSR 60s-90s s/p Diltiazem 60mg TID.   -- Continue short-acting Cardizem in setting of COVID PNA. Switch to Diltiazem 240mg ER on discharge.  -- Consider switching lovenox to oral AC, due to [id number] score 1 and length of time in AFib.  -- Would consider antiarrythmic to maintain NSR once pt stable post-COVID.      JERICHO Arroyo PA-C  [phone] (former [id number])

## 2021-03-11 NOTE — PROGRESS NOTE ADULT - SUBJECTIVE AND OBJECTIVE BOX
Infectious Diseases progress note:    Subjective:  Resting comfortably, afebrile.  On NRB mask    ROS:  CONSTITUTIONAL:  No fever, chills, rigors  CARDIOVASCULAR:  No chest pain or palpitations  RESPIRATORY:   No SOB, cough, dyspnea on exertion.  No wheezing  GASTROINTESTINAL:  No abd pain, N/V, diarrhea/constipation  EXTREMITIES:  No swelling or joint pain  GENITOURINARY:  No burning on urination, increased frequency or urgency.  No flank pain  NEUROLOGIC:  No HA, visual disturbances  SKIN: No rashes    Allergies    Actifed (Headache)  Sudafed (Headache)    Intolerances        ANTIBIOTICS/RELEVANT:  antimicrobials    immunologic:    OTHER:  acetaminophen   Tablet .. 650 milliGRAM(s) Oral every 6 hours PRN  diltiazem    Tablet 60 milliGRAM(s) Oral three times a day  enoxaparin Injectable 140 milliGRAM(s) SubCutaneous every 12 hours      Objective:  Vital Signs Last 24 Hrs  T(C): 36.8 (11 Mar 2021 10:55), Max: 36.8 (11 Mar 2021 10:55)  T(F): 98.2 (11 Mar 2021 10:55), Max: 98.2 (11 Mar 2021 10:55)  HR: 88 (11 Mar 2021 15:53) (80 - 165)  BP: 124/80 (11 Mar 2021 10:55) (100/61 - 124/80)  BP(mean): --  RR: 18 (11 Mar 2021 15:53) (17 - 20)  SpO2: 97% (11 Mar 2021 15:53) (92% - 99%)    PHYSICAL EXAM:  Constitutional:NAD  Eyes:KRISTEN, EOMI  Ear/Nose/Throat: no thrush, mucositis.  Moist mucous membranes	  Neck:no JVD, no lymphadenopathy, supple  Respiratory: CTA berna  Cardiovascular: S1S2 RRR, no murmurs  Gastrointestinal:soft, nontender,  nondistended (+) BS  Extremities:no e/e/c  Skin:  no rashes, open wounds or ulcerations        LABS:                        13.9   6.54  )-----------( 287      ( 11 Mar 2021 06:42 )             42.4     03-11    143  |  102  |  18  ----------------------------<  81  3.7   |  26  |  1.08    Ca    9.0      11 Mar 2021 07:29  Mg     2.2     03-11    TPro  6.5  /  Alb  2.8<L>  /  TBili  0.4  /  DBili  x   /  AST  33  /  ALT  38  /  AlkPhos  51  03-10          Procalcitonin, Serum: 0.58 (02-27 @ 13:25)        MICROBIOLOGY:    Culture - Blood (02.27.21 @ 18:02)   Specimen Source: .Blood Blood   Culture Results:   No Growth Final       RADIOLOGY & ADDITIONAL STUDIES:    < from: Xray Chest 1 View- PORTABLE-Routine (Xray Chest 1 View- PORTABLE-Routine .) (03.08.21 @ 15:49) >  Impression:    The heart is slightly enlarged. Airway opacities are seen in both lungs compatible with multifocal pneumonia such as seen in Covid 19. This appears to be slightly worsened when compared to previous study done March 1, 2021 at 5:49 PM.    < end of copied text >

## 2021-03-11 NOTE — PROGRESS NOTE ADULT - SUBJECTIVE AND OBJECTIVE BOX
Follow-up Pulm Progress Note  Francisco Ramon MD  [phone]    Converted to NRB overnight and now HiFlo nasal cannula at 90%  OOB in chair: no new respiratory complaints, at baseline      Vital Signs Last 24 Hrs  T(C): 36.8 (11 Mar 2021 10:55), Max: 36.9 (10 Mar 2021 15:25)  T(F): 98.2 (11 Mar 2021 10:55), Max: 98.4 (10 Mar 2021 15:25)  HR: 80 (11 Mar 2021 10:55) (80 - 168)  BP: 124/80 (11 Mar 2021 10:55) (98/68 - 124/80)  BP(mean): --  RR: 17 (11 Mar 2021 10:55) (17 - 20)  SpO2: 97% (11 Mar 2021 10:55) (90% - 99%)                       13.9   6.54  )-----------( 287      ( 11 Mar 2021 06:42 )             42.4       03-11    143  |  102  |  18  ----------------------------<  81  3.7   |  26  |  1.08    Ca    9.0      11 Mar 2021 07:29  Mg     2.2     03-11    TPro  6.5  /  Alb  2.8<L>  /  TBili  0.4  /  DBili  x   /  AST  33  /  ALT  38  /  AlkPhos  51  03-10      CULTURES:  Culture Results:   No growth to date. (02-27 @ 18:02)  Culture Results:   No growth to date. (02-27 @ 18:02)    Most recent blood culture -- 02-27 @ 18:02   -- -- .Blood Blood 02-27 @ 18:02      Physical Examination:  PULM: No wheeze  CVS: Regular rate and rhythm, no murmurs, rubs, or gallops  ABD: Soft, non-tender  EXT:  No clubbing, cyanosis, or edema    RADIOLOGY REVIEWED  CXR:    CT chest:    PROCEDURE DATE:  02/27/2021        INTERPRETATION:  CLINICAL INFORMATION: Hypoxia, Covid 19 infection, assess for pulmonary embolism    COMPARISON: Chest radiograph from same day    PROCEDURE:  CT Angiography of the Chest.  90 ml of Omnipaque 350 was injected intravenously. 10 ml were discarded.  Sagittal and coronal reformats were performed as well as 3D (MIP) reconstructions.    FINDINGS:    LUNGS AND AIRWAYS: Patent central airways.  Bilateral patchy groundglass and consolidative opacities with peripheral and lower lobe predominance.  PLEURA: No pleural effusion.  MEDIASTINUM AND KASIE: No lymphadenopathy.  VESSELS: No central, main, lobar, or segmental pulmonary embolism. Evaluation of the subsegmental pulmonary arteries is limited/nondiagnostic secondary to respiratory motion.  HEART: Heart size is normal. No pericardial effusion.  CHEST WALL AND LOWER NECK: Prominent right upper paratracheal lymph node may be reactive  VISUALIZED UPPER ABDOMEN: Small hiatal hernia.  BONES: Spinal degenerative changes.    IMPRESSION:  No central, main, lobar, or segmental pulmonary embolism. Evaluation of the subsegmental pulmonary arteries is limited/nondiagnostic secondary to respiratory motion.    Bilateral patchy groundglass and consolidative opacities with peripheral and lower lobe predominance, compatible with patient's known Covid 19 infection. Follow-up to resolution recommended.    TTE:

## 2021-03-12 LAB
ALBUMIN SERPL ELPH-MCNC: 2.6 G/DL — LOW (ref 3.3–5)
ALP SERPL-CCNC: 44 U/L — SIGNIFICANT CHANGE UP (ref 40–120)
ALT FLD-CCNC: 38 U/L — SIGNIFICANT CHANGE UP (ref 10–45)
ANION GAP SERPL CALC-SCNC: 9 MMOL/L — SIGNIFICANT CHANGE UP (ref 5–17)
AST SERPL-CCNC: 30 U/L — SIGNIFICANT CHANGE UP (ref 10–40)
BASOPHILS # BLD AUTO: 0.02 K/UL — SIGNIFICANT CHANGE UP (ref 0–0.2)
BASOPHILS NFR BLD AUTO: 0.3 % — SIGNIFICANT CHANGE UP (ref 0–2)
BILIRUB SERPL-MCNC: 0.5 MG/DL — SIGNIFICANT CHANGE UP (ref 0.2–1.2)
BUN SERPL-MCNC: 13 MG/DL — SIGNIFICANT CHANGE UP (ref 7–23)
CALCIUM SERPL-MCNC: 8.8 MG/DL — SIGNIFICANT CHANGE UP (ref 8.4–10.5)
CHLORIDE SERPL-SCNC: 101 MMOL/L — SIGNIFICANT CHANGE UP (ref 96–108)
CO2 SERPL-SCNC: 29 MMOL/L — SIGNIFICANT CHANGE UP (ref 22–31)
CREAT SERPL-MCNC: 0.99 MG/DL — SIGNIFICANT CHANGE UP (ref 0.5–1.3)
CRP SERPL-MCNC: 3.02 MG/DL — HIGH (ref 0–0.4)
D DIMER BLD IA.RAPID-MCNC: 272 NG/ML DDU — HIGH
EOSINOPHIL # BLD AUTO: 0.19 K/UL — SIGNIFICANT CHANGE UP (ref 0–0.5)
EOSINOPHIL NFR BLD AUTO: 2.6 % — SIGNIFICANT CHANGE UP (ref 0–6)
FERRITIN SERPL-MCNC: 666 NG/ML — HIGH (ref 30–400)
GLUCOSE SERPL-MCNC: 80 MG/DL — SIGNIFICANT CHANGE UP (ref 70–99)
HCT VFR BLD CALC: 39 % — SIGNIFICANT CHANGE UP (ref 39–50)
HGB BLD-MCNC: 12.7 G/DL — LOW (ref 13–17)
IMM GRANULOCYTES NFR BLD AUTO: 0.3 % — SIGNIFICANT CHANGE UP (ref 0–1.5)
LYMPHOCYTES # BLD AUTO: 1.14 K/UL — SIGNIFICANT CHANGE UP (ref 1–3.3)
LYMPHOCYTES # BLD AUTO: 15.5 % — SIGNIFICANT CHANGE UP (ref 13–44)
MAGNESIUM SERPL-MCNC: 2 MG/DL — SIGNIFICANT CHANGE UP (ref 1.6–2.6)
MCHC RBC-ENTMCNC: 29.9 PG — SIGNIFICANT CHANGE UP (ref 27–34)
MCHC RBC-ENTMCNC: 32.6 GM/DL — SIGNIFICANT CHANGE UP (ref 32–36)
MCV RBC AUTO: 91.8 FL — SIGNIFICANT CHANGE UP (ref 80–100)
MONOCYTES # BLD AUTO: 0.7 K/UL — SIGNIFICANT CHANGE UP (ref 0–0.9)
MONOCYTES NFR BLD AUTO: 9.5 % — SIGNIFICANT CHANGE UP (ref 2–14)
NEUTROPHILS # BLD AUTO: 5.28 K/UL — SIGNIFICANT CHANGE UP (ref 1.8–7.4)
NEUTROPHILS NFR BLD AUTO: 71.8 % — SIGNIFICANT CHANGE UP (ref 43–77)
NRBC # BLD: 0 /100 WBCS — SIGNIFICANT CHANGE UP (ref 0–0)
PLATELET # BLD AUTO: 248 K/UL — SIGNIFICANT CHANGE UP (ref 150–400)
POTASSIUM SERPL-MCNC: 4 MMOL/L — SIGNIFICANT CHANGE UP (ref 3.5–5.3)
POTASSIUM SERPL-SCNC: 4 MMOL/L — SIGNIFICANT CHANGE UP (ref 3.5–5.3)
PROT SERPL-MCNC: 6 G/DL — SIGNIFICANT CHANGE UP (ref 6–8.3)
RBC # BLD: 4.25 M/UL — SIGNIFICANT CHANGE UP (ref 4.2–5.8)
RBC # FLD: 13.5 % — SIGNIFICANT CHANGE UP (ref 10.3–14.5)
SODIUM SERPL-SCNC: 139 MMOL/L — SIGNIFICANT CHANGE UP (ref 135–145)
WBC # BLD: 7.35 K/UL — SIGNIFICANT CHANGE UP (ref 3.8–10.5)
WBC # FLD AUTO: 7.35 K/UL — SIGNIFICANT CHANGE UP (ref 3.8–10.5)

## 2021-03-12 PROCEDURE — 99232 SBSQ HOSP IP/OBS MODERATE 35: CPT

## 2021-03-12 RX ORDER — SODIUM CHLORIDE 0.65 %
1 AEROSOL, SPRAY (ML) NASAL ONCE
Refills: 0 | Status: COMPLETED | OUTPATIENT
Start: 2021-03-12 | End: 2021-03-22

## 2021-03-12 RX ADMIN — Medication 60 MILLIGRAM(S): at 05:12

## 2021-03-12 RX ADMIN — ENOXAPARIN SODIUM 140 MILLIGRAM(S): 100 INJECTION SUBCUTANEOUS at 18:05

## 2021-03-12 RX ADMIN — Medication 60 MILLIGRAM(S): at 13:04

## 2021-03-12 RX ADMIN — ENOXAPARIN SODIUM 140 MILLIGRAM(S): 100 INJECTION SUBCUTANEOUS at 05:12

## 2021-03-12 RX ADMIN — Medication 60 MILLIGRAM(S): at 22:01

## 2021-03-12 NOTE — PROGRESS NOTE ADULT - SUBJECTIVE AND OBJECTIVE BOX
Subjective: Patient seen and examined. No new events except as noted.   Hr improved , now in SR   remains on NRB     REVIEW OF SYSTEMS:    CONSTITUTIONAL:+ weakness, fevers or chills  EYES/ENT: No visual changes;  No vertigo or throat pain   NECK: No pain or stiffness  RESPIRATORY: No cough, wheezing, hemoptysis; No shortness of breath  CARDIOVASCULAR: No chest pain or palpitations  GASTROINTESTINAL: No abdominal or epigastric pain. No nausea, vomiting, or hematemesis; No diarrhea or constipation. No melena or hematochezia.  GENITOURINARY: No dysuria, frequency or hematuria  NEUROLOGICAL: No numbness or weakness  SKIN: No itching, burning, rashes, or lesions   All other review of systems is negative unless indicated above.    MEDICATIONS:  MEDICATIONS  (STANDING):  diltiazem    Tablet 60 milliGRAM(s) Oral three times a day  enoxaparin Injectable 140 milliGRAM(s) SubCutaneous every 12 hours  sodium chloride 0.65% Nasal 1 Spray(s) Both Nostrils once      PHYSICAL EXAM:  T(C): 36.7 (03-12-21 @ 04:34), Max: 36.8 (03-11-21 @ 10:55)  HR: 83 (03-12-21 @ 09:50) (80 - 88)  BP: 132/84 (03-12-21 @ 04:34) (108/72 - 132/84)  RR: 21 (03-12-21 @ 09:50) (17 - 21)  SpO2: 100% (03-12-21 @ 09:50) (90% - 100%)  Wt(kg): --  I&O's Summary    11 Mar 2021 07:01  -  12 Mar 2021 07:00  --------------------------------------------------------  IN: 3000 mL / OUT: 2750 mL / NET: 250 mL          Appearance: NAD NRB    HEENT:   Normal oral mucosa, PERRL, EOMI	  Lymphatic: No lymphadenopathy , no edema  Cardiovascular: Normal S1 S2, No JVD, No murmurs , Peripheral pulses palpable 2+ bilaterally  Respiratory: Lungs clear to auscultation, normal effort 	  Gastrointestinal:  Soft, Non-tender, + BS	  Skin: No rashes, No ecchymoses, No cyanosis, warm to touch  Musculoskeletal: Normal range of motion, normal strength  Psychiatry:  Mood & affect appropriate  Ext: No edema      LABS:    CARDIAC MARKERS:    D-Dimer Assay, Quantitative: 272 ng/mL DDU (03.12.21 @ 05:23)                             12.7   7.35  )-----------( 248      ( 12 Mar 2021 05:22 )             39.0     03-12    139  |  101  |  13  ----------------------------<  80  4.0   |  29  |  0.99    Ca    8.8      12 Mar 2021 05:22  Mg     2.0     03-12    TPro  6.0  /  Alb  2.6<L>  /  TBili  0.5  /  DBili  x   /  AST  30  /  ALT  38  /  AlkPhos  44  03-12    proBNP:   Lipid Profile:   HgA1c:   TSH:             TELEMETRY: 	SR     ECG:  	  RADIOLOGY:    DIAGNOSTIC TESTING:  [ ] Echocardiogram:  [ ]  Catheterization:  [ ] Stress Test:    OTHER:

## 2021-03-12 NOTE — PROGRESS NOTE ADULT - ASSESSMENT
ACute hypoxemic respiratory failure  Obesity  Multilobar viral pneumonia due to COVID 19  Atrial Fibrillation: now back in NSR    REC    Trial of nasal O2 at 6 liters followed by 50% ventimask if former not tolerated  Will add nasal CPAP nocturnal  Mobilize

## 2021-03-12 NOTE — PROGRESS NOTE ADULT - ASSESSMENT
The patient is a 65y Male, hx of morbid obesity, HTN on lisinopril, complaining of shortness of breath. covid + dx 5d prior. no home O2 requirement, former smoker. has dyspnea at rest and on exertion. has not taken steroids, no hospitalization for covid. denies CP. [facility] (27 Feb 2021 22:29)    ER vital:  T 98.4, P 178, /85.  Pt satting 85% on RA, placed on nasal cannula, advanced to NRB.  Covid pcr (+), covid serologies (-).   CT angio chest (-) for PE, (+) b/l patchy opacities.  Pt with elevated AST (>5x ULN but <10x) and ALT.   Pt started on remdesivir and dexamethasone.     Acute covid illness:    - Cont remdesivir and dexamethasone.  Monitor daily LFTs and renal function while pt on remdesivir.  LFTs elevated, possibly secondary to viral infection.  Currently ALT < 10x ULN, can cont remdesivir.  LFTs slowly improving.     - Monitor pulse ox, pt on supplemental O2 - now on Bipap    - Monitor inflammatory markers q72    DD:  272 <-- 359 <-- 248 <-- 213 <-- 282 <-- 343  ferritin: 666 <-- 692 <-- 1468 <-- 1636  pct:  0.58  crp: 3.0 <-- 2.2 <-- 10.3 <-- 10.9    - Pt on full dose lovenox for dvt/pe.  CT angio chest (-) for PE.   LE dopplers neg DVT    *  Repeat cxr 3/8 with continued b/l patchy opacities with slight worsening.  No indication for abx at this time.  Pt on NRB.       Dr. David Chambers covering 3/13, 3/14.  [phone]

## 2021-03-12 NOTE — PROGRESS NOTE ADULT - SUBJECTIVE AND OBJECTIVE BOX
24H hour events:   No events overnight    MEDICATIONS:  diltiazem    Tablet 60 milliGRAM(s) Oral three times a day  enoxaparin Injectable 140 milliGRAM(s) SubCutaneous every 12 hours  acetaminophen   Tablet .. 650 milliGRAM(s) Oral every 6 hours PRN  sodium chloride 0.65% Nasal 1 Spray(s) Both Nostrils once      PHYSICAL EXAM:  T(C): 36.7 (03-12-21 @ 04:34), Max: 36.8 (03-11-21 @ 10:55)  HR: 83 (03-12-21 @ 09:50) (80 - 88)  BP: 132/84 (03-12-21 @ 04:34) (108/72 - 132/84)  RR: 21 (03-12-21 @ 09:50) (17 - 21)  SpO2: 100% (03-12-21 @ 09:50) (90% - 100%)  Wt(kg): --  I&O's Summary    11 Mar 2021 07:01  -  12 Mar 2021 07:00  --------------------------------------------------------  IN: 3000 mL / OUT: 2750 mL / NET: 250 mL      LABS:	 	    CBC Full  -  ( 12 Mar 2021 05:22 )  WBC Count : 7.35 K/uL  Hemoglobin : 12.7 g/dL  Hematocrit : 39.0 %  Platelet Count - Automated : 248 K/uL  Mean Cell Volume : 91.8 fl  Mean Cell Hemoglobin : 29.9 pg  Mean Cell Hemoglobin Concentration : 32.6 gm/dL  Auto Neutrophil # : 5.28 K/uL  Auto Lymphocyte # : 1.14 K/uL  Auto Monocyte # : 0.70 K/uL  Auto Eosinophil # : 0.19 K/uL  Auto Basophil # : 0.02 K/uL  Auto Neutrophil % : 71.8 %  Auto Lymphocyte % : 15.5 %  Auto Monocyte % : 9.5 %  Auto Eosinophil % : 2.6 %  Auto Basophil % : 0.3 %    03-12    139  |  101  |  13  ----------------------------<  80  4.0   |  29  |  0.99  03-11    143  |  102  |  18  ----------------------------<  81  3.7   |  26  |  1.08    Ca    8.8      12 Mar 2021 05:22  Ca    9.0      11 Mar 2021 07:29  Mg     2.0     03-12  Mg     2.2     03-11    TPro  6.0  /  Alb  2.6<L>  /  TBili  0.5  /  DBili  x   /  AST  30  /  ALT  38  /  AlkPhos  44  03-12      proBNP:   Lipid Profile:   HgA1c:   TSH:       CARDIAC MARKERS:      TELEMETRY: NSR 60-90's  	    Study Date: 3/8/2021    PROCEDURE: Transthoracic echocardiogram with 2-D, M-Mode  and complete spectral and color flow Doppler    Dimensions:    Normal Values:  LA:     3.7    2.0 - 4.0 cm  Ao:     3.2    2.0 - 3.8 cm  SEPTUM: 1.1    0.6 - 1.2 cm  PWT:    0.7    0.6 - 1.1 cm  LVIDd:  5.2    3.0 - 5.6 cm  LVIDs:  3.4    1.8 - 4.0 cm  Derived variables:  LVMI: 67 g/m2  RWT: 0.26  Fractional short: 35 %  EF (Visual Estimate): 50-55 %  EF (Dupont Rule): 55 %Doppler Peak Velocity (m/sec):  AoV=1.0  ------------------------------------------------------------------------  Conclusions:  1. Mitral annular calcification and calcified mitral  leaflets with normal diastolic opening.  2. Calcified trileaflet aortic valve with normal opening.  3. Normal left ventricular internal dimensions and wall  thicknesses.  4. Endocardial visualization enhanced with intravenous  injection of Ultrasonic Enhancing Agent (Definity).  Mild  segmental left ventricular systolic dysfunction with  overall preserved EF.  Hypokinesis of the mid to distal  anteroseptal wall and apex.  5. Normal diastolic function  6. Normal right ventricular size and function.  *** No previous Echo exam.

## 2021-03-12 NOTE — PROGRESS NOTE ADULT - ASSESSMENT
65y Male, hx of morbid obesity, HTN on lisinopril, complaining of shortness of breath COVID-19 positive s/p steroids and Remdesivir, hospital course complicated by new oxygen requirement and Afib w RVR. Ep consulted for further management. Pt self-converted to NSR 60s-90s this afternoon; duration of Afib indeterminate  [id number] score 1    # AFib w/ RVR  - Review of telemetry overnight remains in sinus rhythm, rate controlled 60-90's,   - On Diltiazem 60mg tid s/p Diltiazem 60mg TID, consider changing to long acting Diltiazem ER 240mg qd on discharge  - On Lovenox for AC, consider switching to NOAC, with normal serum Cr level  - Will discuss with EP attdg regarding antiarrhythmic in maintaining sinus rhythm    BRISEIDA Montenegro-C  #[id number]

## 2021-03-12 NOTE — PROGRESS NOTE ADULT - SUBJECTIVE AND OBJECTIVE BOX
Infectious Diseases progress note:    Subjective:  NAD, sitting comfortably, on NRB mask. Awake, alert.  No new complaints    ROS:  CONSTITUTIONAL:  No fever, chills, rigors  CARDIOVASCULAR:  No chest pain or palpitations  RESPIRATORY:   No SOB, cough, dyspnea on exertion.  No wheezing  GASTROINTESTINAL:  No abd pain, N/V, diarrhea/constipation  EXTREMITIES:  No swelling or joint pain  GENITOURINARY:  No burning on urination, increased frequency or urgency.  No flank pain  NEUROLOGIC:  No HA, visual disturbances  SKIN: No rashes    Allergies    Actifed (Headache)  Sudafed (Headache)    Intolerances        ANTIBIOTICS/RELEVANT:  antimicrobials    immunologic:    OTHER:  acetaminophen   Tablet .. 650 milliGRAM(s) Oral every 6 hours PRN  diltiazem    Tablet 60 milliGRAM(s) Oral three times a day  enoxaparin Injectable 140 milliGRAM(s) SubCutaneous every 12 hours  sodium chloride 0.65% Nasal 1 Spray(s) Both Nostrils once      Objective:  Vital Signs Last 24 Hrs  T(C): 36.4 (12 Mar 2021 11:50), Max: 36.7 (12 Mar 2021 04:34)  T(F): 97.5 (12 Mar 2021 11:50), Max: 98.1 (12 Mar 2021 04:34)  HR: 78 (12 Mar 2021 15:27) (78 - 99)  BP: 121/76 (12 Mar 2021 12:15) (119/83 - 132/84)  BP(mean): --  RR: 18 (12 Mar 2021 17:11) (18 - 21)  SpO2: 92% (12 Mar 2021 17:11) (90% - 100%)    PHYSICAL EXAM:  Constitutional:NAD  Eyes:KRISTEN, EOMI  Ear/Nose/Throat: no thrush, mucositis.  Moist mucous membranes	  Neck:no JVD, no lymphadenopathy, supple  Respiratory: CTA berna  Cardiovascular: S1S2 RRR, no murmurs  Gastrointestinal:soft, nontender,  nondistended (+) BS  Extremities:no e/e/c  Skin:  no rashes, open wounds or ulcerations        LABS:                        12.7   7.35  )-----------( 248      ( 12 Mar 2021 05:22 )             39.0     03-12    139  |  101  |  13  ----------------------------<  80  4.0   |  29  |  0.99    Ca    8.8      12 Mar 2021 05:22  Mg     2.0     03-12    TPro  6.0  /  Alb  2.6<L>  /  TBili  0.5  /  DBili  x   /  AST  30  /  ALT  38  /  AlkPhos  44  03-12          Procalcitonin, Serum: 0.58 (02-27 @ 13:25)                    MICROBIOLOGY:      Culture - Blood (02.27.21 @ 18:02)   Specimen Source: .Blood Blood   Culture Results:   No Growth Final         RADIOLOGY & ADDITIONAL STUDIES:    < from: Xray Chest 1 View- PORTABLE-Routine (Xray Chest 1 View- PORTABLE-Routine .) (03.08.21 @ 15:49) >    Impression:    The heart is slightly enlarged. Airway opacities are seen in both lungs compatible with multifocal pneumonia such as seen in Covid 19. This appears to be slightly worsened when compared to previous study done March 1, 2021 at 5:49 PM.    < end of copied text >

## 2021-03-12 NOTE — PROGRESS NOTE ADULT - SUBJECTIVE AND OBJECTIVE BOX
Follow-up Pulm Progress Note  Francisco Ramon MD  [phone]    Stable clinically though back on NRB  No resp complaints      Vital Signs Last 24 Hrs  T(C): 36.4 (12 Mar 2021 11:50), Max: 36.7 (12 Mar 2021 04:34)  T(F): 97.5 (12 Mar 2021 11:50), Max: 98.1 (12 Mar 2021 04:34)  HR: 99 (12 Mar 2021 12:15) (81 - 99)  BP: 121/76 (12 Mar 2021 12:15) (108/72 - 132/84)  BP(mean): --  RR: 20 (12 Mar 2021 11:50) (18 - 21)  SpO2: 95% (12 Mar 2021 12:15) (90% - 100%)                          12.7   7.35  )-----------( 248      ( 12 Mar 2021 05:22 )             39.0     03-12    139  |  101  |  13  ----------------------------<  80  4.0   |  29  |  0.99    Ca    8.8      12 Mar 2021 05:22  Mg     2.0     03-12    TPro  6.0  /  Alb  2.6<L>  /  TBili  0.5  /  DBili  x   /  AST  30  /  ALT  38  /  AlkPhos  44  03-12      CULTURES:  Culture Results:   No growth to date. (02-27 @ 18:02)  Culture Results:   No growth to date. (02-27 @ 18:02)    Most recent blood culture -- 02-27 @ 18:02   -- -- .Blood Blood 02-27 @ 18:02      Physical Examination:  PULM: No wheeze  CVS: Regular rate and rhythm, no murmurs, rubs, or gallops  ABD: Soft, non-tender  EXT:  No clubbing, cyanosis, or edema    RADIOLOGY REVIEWED  CXR:    CT chest:    PROCEDURE DATE:  02/27/2021        INTERPRETATION:  CLINICAL INFORMATION: Hypoxia, Covid 19 infection, assess for pulmonary embolism    COMPARISON: Chest radiograph from same day    PROCEDURE:  CT Angiography of the Chest.  90 ml of Omnipaque 350 was injected intravenously. 10 ml were discarded.  Sagittal and coronal reformats were performed as well as 3D (MIP) reconstructions.    FINDINGS:    LUNGS AND AIRWAYS: Patent central airways.  Bilateral patchy groundglass and consolidative opacities with peripheral and lower lobe predominance.  PLEURA: No pleural effusion.  MEDIASTINUM AND KASIE: No lymphadenopathy.  VESSELS: No central, main, lobar, or segmental pulmonary embolism. Evaluation of the subsegmental pulmonary arteries is limited/nondiagnostic secondary to respiratory motion.  HEART: Heart size is normal. No pericardial effusion.  CHEST WALL AND LOWER NECK: Prominent right upper paratracheal lymph node may be reactive  VISUALIZED UPPER ABDOMEN: Small hiatal hernia.  BONES: Spinal degenerative changes.    IMPRESSION:  No central, main, lobar, or segmental pulmonary embolism. Evaluation of the subsegmental pulmonary arteries is limited/nondiagnostic secondary to respiratory motion.    Bilateral patchy groundglass and consolidative opacities with peripheral and lower lobe predominance, compatible with patient's known Covid 19 infection. Follow-up to resolution recommended.    TTE:

## 2021-03-12 NOTE — PROGRESS NOTE ADULT - SUBJECTIVE AND OBJECTIVE BOX
Patient is a 65y old  Male who presents with a chief complaint of acute hypoxemic respiratory failure (12 Mar 2021 21:03)      SUBJECTIVE / OVERNIGHT EVENTS: remains on 100 % NRB    MEDICATIONS  (STANDING):  diltiazem    Tablet 60 milliGRAM(s) Oral three times a day  enoxaparin Injectable 140 milliGRAM(s) SubCutaneous every 12 hours  sodium chloride 0.65% Nasal 1 Spray(s) Both Nostrils once    MEDICATIONS  (PRN):  acetaminophen   Tablet .. 650 milliGRAM(s) Oral every 6 hours PRN Mild Pain (1 - 3)      Vital Signs Last 24 Hrs  T(F): 97.5 (03-12-21 @ 11:50), Max: 98.1 (03-12-21 @ 04:34)  HR: 78 (03-12-21 @ 15:27) (78 - 99)  BP: 121/76 (03-12-21 @ 12:15) (119/83 - 132/84)  RR: 18 (03-12-21 @ 17:11) (18 - 21)  SpO2: 92% (03-12-21 @ 17:11) (90% - 100%)  Telemetry:   CAPILLARY BLOOD GLUCOSE        I&O's Summary    11 Mar 2021 07:01  -  12 Mar 2021 07:00  --------------------------------------------------------  IN: 3000 mL / OUT: 2750 mL / NET: 250 mL    12 Mar 2021 07:01  -  12 Mar 2021 21:49  --------------------------------------------------------  IN: 480 mL / OUT: 950 mL / NET: -470 mL        PHYSICAL EXAM:  GENERAL: NAD, well-developed  HEAD:  Atraumatic, Normocephalic  EYES: EOMI, PERRLA, conjunctiva and sclera clear  NECK: Supple, No JVD  CHEST/LUNG: Clear to auscultation bilaterally; No wheeze  HEART: Regular rate and rhythm; No murmurs, rubs, or gallops  ABDOMEN: Soft, Nontender, Nondistended; Bowel sounds present  EXTREMITIES:  2+ Peripheral Pulses, No clubbing, cyanosis, or edema  PSYCH: AAOx3  NEUROLOGY: non-focal  SKIN: No rashes or lesions    LABS:                        12.7   7.35  )-----------( 248      ( 12 Mar 2021 05:22 )             39.0     03-12    139  |  101  |  13  ----------------------------<  80  4.0   |  29  |  0.99    Ca    8.8      12 Mar 2021 05:22  Mg     2.0     03-12    TPro  6.0  /  Alb  2.6<L>  /  TBili  0.5  /  DBili  x   /  AST  30  /  ALT  38  /  AlkPhos  44  03-12              RADIOLOGY & ADDITIONAL TESTS:    Imaging Personally Reviewed:    Consultant(s) Notes Reviewed:      Care Discussed with Consultants/Other Providers:

## 2021-03-13 LAB
ANION GAP SERPL CALC-SCNC: 9 MMOL/L — SIGNIFICANT CHANGE UP (ref 5–17)
BUN SERPL-MCNC: 9 MG/DL — SIGNIFICANT CHANGE UP (ref 7–23)
CALCIUM SERPL-MCNC: 9 MG/DL — SIGNIFICANT CHANGE UP (ref 8.4–10.5)
CHLORIDE SERPL-SCNC: 105 MMOL/L — SIGNIFICANT CHANGE UP (ref 96–108)
CO2 SERPL-SCNC: 30 MMOL/L — SIGNIFICANT CHANGE UP (ref 22–31)
CREAT SERPL-MCNC: 0.93 MG/DL — SIGNIFICANT CHANGE UP (ref 0.5–1.3)
GLUCOSE SERPL-MCNC: 84 MG/DL — SIGNIFICANT CHANGE UP (ref 70–99)
MAGNESIUM SERPL-MCNC: 2 MG/DL — SIGNIFICANT CHANGE UP (ref 1.6–2.6)
PHOSPHATE SERPL-MCNC: 2.6 MG/DL — SIGNIFICANT CHANGE UP (ref 2.5–4.5)
POTASSIUM SERPL-MCNC: 3.7 MMOL/L — SIGNIFICANT CHANGE UP (ref 3.5–5.3)
POTASSIUM SERPL-SCNC: 3.7 MMOL/L — SIGNIFICANT CHANGE UP (ref 3.5–5.3)
SODIUM SERPL-SCNC: 144 MMOL/L — SIGNIFICANT CHANGE UP (ref 135–145)

## 2021-03-13 PROCEDURE — 93010 ELECTROCARDIOGRAM REPORT: CPT

## 2021-03-13 RX ORDER — METOPROLOL TARTRATE 50 MG
5 TABLET ORAL ONCE
Refills: 0 | Status: COMPLETED | OUTPATIENT
Start: 2021-03-13 | End: 2021-03-13

## 2021-03-13 RX ORDER — DILTIAZEM HCL 120 MG
90 CAPSULE, EXT RELEASE 24 HR ORAL THREE TIMES A DAY
Refills: 0 | Status: DISCONTINUED | OUTPATIENT
Start: 2021-03-13 | End: 2021-03-13

## 2021-03-13 RX ORDER — POTASSIUM CHLORIDE 20 MEQ
40 PACKET (EA) ORAL ONCE
Refills: 0 | Status: COMPLETED | OUTPATIENT
Start: 2021-03-13 | End: 2021-03-13

## 2021-03-13 RX ORDER — DIGOXIN 250 MCG
250 TABLET ORAL ONCE
Refills: 0 | Status: COMPLETED | OUTPATIENT
Start: 2021-03-13 | End: 2021-03-13

## 2021-03-13 RX ORDER — DIGOXIN 250 MCG
250 TABLET ORAL DAILY
Refills: 0 | Status: DISCONTINUED | OUTPATIENT
Start: 2021-03-13 | End: 2021-03-15

## 2021-03-13 RX ORDER — DILTIAZEM HCL 120 MG
60 CAPSULE, EXT RELEASE 24 HR ORAL THREE TIMES A DAY
Refills: 0 | Status: DISCONTINUED | OUTPATIENT
Start: 2021-03-13 | End: 2021-03-16

## 2021-03-13 RX ORDER — GUAIFENESIN/DEXTROMETHORPHAN 600MG-30MG
10 TABLET, EXTENDED RELEASE 12 HR ORAL EVERY 6 HOURS
Refills: 0 | Status: DISCONTINUED | OUTPATIENT
Start: 2021-03-13 | End: 2021-04-05

## 2021-03-13 RX ADMIN — Medication 5 MILLIGRAM(S): at 14:14

## 2021-03-13 RX ADMIN — Medication 250 MICROGRAM(S): at 18:17

## 2021-03-13 RX ADMIN — Medication 10 MILLILITER(S): at 23:02

## 2021-03-13 RX ADMIN — Medication 40 MILLIEQUIVALENT(S): at 17:01

## 2021-03-13 RX ADMIN — ENOXAPARIN SODIUM 140 MILLIGRAM(S): 100 INJECTION SUBCUTANEOUS at 17:01

## 2021-03-13 RX ADMIN — Medication 5 MILLIGRAM(S): at 19:12

## 2021-03-13 RX ADMIN — Medication 10 MILLILITER(S): at 17:01

## 2021-03-13 RX ADMIN — Medication 60 MILLIGRAM(S): at 05:01

## 2021-03-13 RX ADMIN — Medication 250 MICROGRAM(S): at 20:56

## 2021-03-13 RX ADMIN — Medication 60 MILLIGRAM(S): at 21:03

## 2021-03-13 RX ADMIN — ENOXAPARIN SODIUM 140 MILLIGRAM(S): 100 INJECTION SUBCUTANEOUS at 05:01

## 2021-03-13 RX ADMIN — Medication 60 MILLIGRAM(S): at 13:16

## 2021-03-13 RX ADMIN — Medication 100 MILLIGRAM(S): at 21:03

## 2021-03-13 NOTE — PROVIDER CONTACT NOTE (OTHER) - RECOMMENDATIONS
PA made aware and RN encouraged the patient to wear HFNC,patient still refusing
add mg and phos to am labs.
no interventions at this time
Notify NP, continue to monitor
cardizem ivp x1 dose now
EKG? Labs?
Notify Respiratory. Notify Provider.

## 2021-03-13 NOTE — PROGRESS NOTE ADULT - SUBJECTIVE AND OBJECTIVE BOX
Subjective: Patient seen and examined. No new events except as noted.   Afib RVR   remains on NRB    no cp or sob      REVIEW OF SYSTEMS:    CONSTITUTIONAL:+ weakness, fevers or chills  EYES/ENT: No visual changes;  No vertigo or throat pain   NECK: No pain or stiffness  RESPIRATORY: No cough, wheezing, hemoptysis; No shortness of breath  CARDIOVASCULAR: No chest pain or palpitations  GASTROINTESTINAL: No abdominal or epigastric pain. No nausea, vomiting, or hematemesis; No diarrhea or constipation. No melena or hematochezia.  GENITOURINARY: No dysuria, frequency or hematuria  NEUROLOGICAL: No numbness or weakness  SKIN: No itching, burning, rashes, or lesions   All other review of systems is negative unless indicated above.    MEDICATIONS:  MEDICATIONS  (STANDING):  benzonatate 100 milliGRAM(s) Oral every 8 hours  digoxin     Tablet 250 MICROGram(s) Oral daily  diltiazem    Tablet 60 milliGRAM(s) Oral three times a day  enoxaparin Injectable 140 milliGRAM(s) SubCutaneous every 12 hours  guaifenesin/dextromethorphan  Syrup 10 milliLiter(s) Oral every 6 hours  sodium chloride 0.65% Nasal 1 Spray(s) Both Nostrils once      PHYSICAL EXAM:  T(C): 36.6 (03-13-21 @ 19:02), Max: 36.9 (03-13-21 @ 02:38)  HR: 98 (03-13-21 @ 19:02) (76 - 107)  BP: 101/69 (03-13-21 @ 19:02) (101/69 - 128/77)  RR: 20 (03-13-21 @ 19:02) (18 - 20)  SpO2: 93% (03-13-21 @ 19:02) (90% - 97%)  Wt(kg): --  I&O's Summary    12 Mar 2021 07:01  -  13 Mar 2021 07:00  --------------------------------------------------------  IN: 480 mL / OUT: 2400 mL / NET: -1920 mL    13 Mar 2021 06:01  -  13 Mar 2021 21:03  --------------------------------------------------------  IN: 240 mL / OUT: 200 mL / NET: 40 mL          Appearance: NAD	  HEENT:   Normal oral mucosa, PERRL, EOMI	  Lymphatic: No lymphadenopathy , no edema  Cardiovascular: Normal S1 S2, No JVD, No murmurs , Peripheral pulses palpable 2+ bilaterally  Respiratory: Lungs clear to auscultation, normal effort 	  Gastrointestinal:  Soft, Non-tender, + BS	  Skin: No rashes, No ecchymoses, No cyanosis, warm to touch  Musculoskeletal: Normal range of motion, normal strength  Psychiatry:  Mood & affect appropriate  Ext: No edema      LABS:    CARDIAC MARKERS:                                12.7   7.35  )-----------( 248      ( 12 Mar 2021 05:22 )             39.0     03-13    144  |  105  |  9   ----------------------------<  84  3.7   |  30  |  0.93    Ca    9.0      13 Mar 2021 06:15  Phos  2.6     03-13  Mg     2.0     03-13    TPro  6.0  /  Alb  2.6<L>  /  TBili  0.5  /  DBili  x   /  AST  30  /  ALT  38  /  AlkPhos  44  03-12    proBNP:   Lipid Profile:   HgA1c:   TSH:             TELEMETRY: AF 90-130s  	    ECG:  	  RADIOLOGY:   DIAGNOSTIC TESTING:  [ ] Echocardiogram:  [ ]  Catheterization:  [ ] Stress Test:    OTHER:

## 2021-03-13 NOTE — CHART NOTE - NSCHARTNOTEFT_GEN_A_CORE
Note HR 140s in A. fib on tele  As per pt, he came back from bathroom, s/p BM.  Temp 97.5 HR 130s- 140s RR 20, O2 sat on 100% NRB 92%  pt denies dizziness, worsening SOB, chest pain/ chest tightness.   s/p Lopressor 5 mg IV X1  Spoke with house cardiology fellow Dr. Braxton regarding recurrent A. fib with RVR management   - Recommended to increase Cardizem to 90 mg TID ( next dose due 9pm)  - Continue Digoxin 250 micrograms daily   - May use Lopressor IV as needed for A. fib with RVR  Now HR down to 100s-110s  Will continue to monitor on tele     Leni Ying NP-C  #[id number] 77 year old male with PMH HTN, DM, HLD transferred from [place] for admission for AMS due to underlying sepsis due to possible PNA,     ACS  - Patient found to have elevated Troponin. Now down trending. No need to trend further  - Technically difficult ECHO showed normal LV & RV size and function EF 55-60%, severe AS  - Severe AS with evolving troponin in the setting of infection with bacteremia  - EKG with ST depressions in leads I, avL, and V5/V6, consistent with possible lateral ischemia.   - Would treat medically for now, will need to be determined whether in pt or out pt assessment of cad/AS will be appropriate, depending upon his clinical course  - S/P full dose Lovenox for 48 hours   - Continue ASA 81 mg daily, Plavix 75 mg daily  - Continue atorvastatin 40 mg daily   - Continue BB    Severe AS  - Recent ECHO with severe AS  - CT chest notable pulmonary edema on CT chest. s/p 3 L NS, this is likely due to volume overload. Would hold off on diuresis. Patient appears euvolemic on examination   - Caution with IVF  - Patient would need outpatient evaluation for severe AS  - Continue BB     Hypertension  - BP: 168/73 (11-10-20 @ 08:00) (145/72 - 168/73)  - Continue BB    Hyperlipidemia  - Continue Lipitor 40 mg HS   - Low fat diet    Acute Kidney injury  - Creatinine trend stable 1.40,  <--1.60,  <--1.70,  <--1.60,  <--1.80,  <--1.90,  <--2.14  - Continue to monitor renal indices  - Avoid nephrotoxins   - Holding losartan and HCTZ, resume when MARINA resolves     Sepsis  - Strep bacteremia ? LLE cellulitis   - Continue antibiotics  - Management per primary team     - Monitor and replete lytes, keep K>4, Mg>2.  - All other medical needs as per primary team.  - Other cardiovascular workup will depend on clinical course.  - Will continue to follow.    Margie Steele, MS FNP, [facility]  Nurse Practitioner- Cardiology   Spectra #[phone]

## 2021-03-13 NOTE — PROGRESS NOTE ADULT - SUBJECTIVE AND OBJECTIVE BOX
Follow-up Pulm Progress Note  Francisco Ramon MD  [phone]    Lying on side on NRB  Reportedly had asaturataion 88% on nasal at 6 liters yesterday  CPAP machine set to BIPAP in room          Vital Signs Last 24 Hrs  T(C): 36.6 (13 Mar 2021 03:59), Max: 36.9 (13 Mar 2021 02:38)  T(F): 97.8 (13 Mar 2021 03:59), Max: 98.4 (13 Mar 2021 02:38)  HR: 93 (13 Mar 2021 09:13) (76 - 99)  BP: 128/77 (13 Mar 2021 03:59) (114/76 - 128/77)  BP(mean): --  RR: 18 (13 Mar 2021 07:35) (18 - 20)  SpO2: 94% (13 Mar 2021 09:13) (90% - 95%)                          12.7   7.35  )-----------( 248      ( 12 Mar 2021 05:22 )             39.0     03-13    144  |  105  |  9   ----------------------------<  84  3.7   |  30  |  0.93    Ca    9.0      13 Mar 2021 06:15  Phos  2.6     03-13  Mg     2.0     03-13    TPro  6.0  /  Alb  2.6<L>  /  TBili  0.5  /  DBili  x   /  AST  30  /  ALT  38  /  AlkPhos  44  03-12      CULTURES:  Culture Results:   No growth to date. (02-27 @ 18:02)  Culture Results:   No growth to date. (02-27 @ 18:02)    Most recent blood culture -- 02-27 @ 18:02   -- -- .Blood Blood 02-27 @ 18:02      Physical Examination:  PULM: No wheeze  CVS: Regular rate and rhythm, no murmurs, rubs, or gallops  ABD: Soft, non-tender  EXT:  No clubbing, cyanosis, or edema    RADIOLOGY REVIEWED  CXR:    CT chest:    PROCEDURE DATE:  02/27/2021        INTERPRETATION:  CLINICAL INFORMATION: Hypoxia, Covid 19 infection, assess for pulmonary embolism    COMPARISON: Chest radiograph from same day    PROCEDURE:  CT Angiography of the Chest.  90 ml of Omnipaque 350 was injected intravenously. 10 ml were discarded.  Sagittal and coronal reformats were performed as well as 3D (MIP) reconstructions.    FINDINGS:    LUNGS AND AIRWAYS: Patent central airways.  Bilateral patchy groundglass and consolidative opacities with peripheral and lower lobe predominance.  PLEURA: No pleural effusion.  MEDIASTINUM AND KASIE: No lymphadenopathy.  VESSELS: No central, main, lobar, or segmental pulmonary embolism. Evaluation of the subsegmental pulmonary arteries is limited/nondiagnostic secondary to respiratory motion.  HEART: Heart size is normal. No pericardial effusion.  CHEST WALL AND LOWER NECK: Prominent right upper paratracheal lymph node may be reactive  VISUALIZED UPPER ABDOMEN: Small hiatal hernia.  BONES: Spinal degenerative changes.    IMPRESSION:  No central, main, lobar, or segmental pulmonary embolism. Evaluation of the subsegmental pulmonary arteries is limited/nondiagnostic secondary to respiratory motion.    Bilateral patchy groundglass and consolidative opacities with peripheral and lower lobe predominance, compatible with patient's known Covid 19 infection. Follow-up to resolution recommended.    TTE:

## 2021-03-13 NOTE — PROGRESS NOTE ADULT - ASSESSMENT
ACute hypoxemic respiratory failure  Obesity  Multilobar viral pneumonia due to COVID 19  Atrial Fibrillation: now back in NSR    REC    DC NRB: target sats are 88-90%: can change to 50% ventimask  CPAP 8 cm during sleep  Mobilize

## 2021-03-13 NOTE — CHART NOTE - NSCHARTNOTEFT_GEN_A_CORE
Tachycardia reported by RN.   A. fib with RVR, HR upto 160s. pt just got out of bed to chair with 50% Venturi mask on.   O2 sat 86% at the time. Placed back on 100% NRB, 15 L, O2 sat 90-92%.   pt seen and examined at bedside. A & O X3, denies dizziness, worsening SOB, chest pain or chest tightness at this time.   /74, Temp 97.7, HR improved to 130s.   EKG shows A. fib with .   s/p Cardizem 60 mg around 1 pm  - Will treat A. fib with RVR with Lopressor 5 mg IV X1  - Continue Cardizem 60 mg TID  - Continue to monitor on tele  - Continue NRB , 15L for now, Goal O2 sat > 88% as discussed with Pulm Dr. Ramon  - Dr. Langley made aware     Leni Ying NP-C  #[id number]

## 2021-03-13 NOTE — PROGRESS NOTE ADULT - SUBJECTIVE AND OBJECTIVE BOX
Patient is a 65y old  Male who presents with a chief complaint of acute hypoxemic respiratory failure (13 Mar 2021 21:03)      SUBJECTIVE / OVERNIGHT EVENTS: ptn w Afib w RVR w ambulation while on Venti    MEDICATIONS  (STANDING):  benzonatate 100 milliGRAM(s) Oral every 8 hours  digoxin     Tablet 250 MICROGram(s) Oral daily  diltiazem    Tablet 60 milliGRAM(s) Oral three times a day  enoxaparin Injectable 140 milliGRAM(s) SubCutaneous every 12 hours  guaifenesin/dextromethorphan  Syrup 10 milliLiter(s) Oral every 6 hours  sodium chloride 0.65% Nasal 1 Spray(s) Both Nostrils once    MEDICATIONS  (PRN):  acetaminophen   Tablet .. 650 milliGRAM(s) Oral every 6 hours PRN Mild Pain (1 - 3)      Vital Signs Last 24 Hrs  T(F): 97.8 (03-13-21 @ 19:02), Max: 98.4 (03-13-21 @ 02:38)  HR: 98 (03-13-21 @ 19:02) (76 - 107)  BP: 101/69 (03-13-21 @ 19:02) (101/69 - 128/77)  RR: 20 (03-13-21 @ 19:02) (18 - 20)  SpO2: 93% (03-13-21 @ 19:02) (90% - 97%)  Telemetry:   CAPILLARY BLOOD GLUCOSE        I&O's Summary    12 Mar 2021 07:01  -  13 Mar 2021 07:00  --------------------------------------------------------  IN: 480 mL / OUT: 2400 mL / NET: -1920 mL    13 Mar 2021 06:01  -  13 Mar 2021 21:36  --------------------------------------------------------  IN: 240 mL / OUT: 200 mL / NET: 40 mL        PHYSICAL EXAM:  GENERAL: NAD, well-developed  HEAD:  Atraumatic, Normocephalic  EYES: EOMI, PERRLA, conjunctiva and sclera clear  NECK: Supple, No JVD  CHEST/LUNG: Clear to auscultation bilaterally; No wheeze  HEART: Regular rate and rhythm; No murmurs, rubs, or gallops  ABDOMEN: Soft, Nontender, Nondistended; Bowel sounds present  EXTREMITIES:  2+ Peripheral Pulses, No clubbing, cyanosis, or edema  PSYCH: AAOx3  NEUROLOGY: non-focal  SKIN: No rashes or lesions    LABS:                        12.7   7.35  )-----------( 248      ( 12 Mar 2021 05:22 )             39.0     03-13    144  |  105  |  9   ----------------------------<  84  3.7   |  30  |  0.93    Ca    9.0      13 Mar 2021 06:15  Phos  2.6     03-13  Mg     2.0     03-13    TPro  6.0  /  Alb  2.6<L>  /  TBili  0.5  /  DBili  x   /  AST  30  /  ALT  38  /  AlkPhos  44  03-12              RADIOLOGY & ADDITIONAL TESTS:    Imaging Personally Reviewed:    Consultant(s) Notes Reviewed:      Care Discussed with Consultants/Other Providers:

## 2021-03-14 LAB
ANION GAP SERPL CALC-SCNC: 11 MMOL/L — SIGNIFICANT CHANGE UP (ref 5–17)
ANION GAP SERPL CALC-SCNC: 9 MMOL/L — SIGNIFICANT CHANGE UP (ref 5–17)
BUN SERPL-MCNC: 11 MG/DL — SIGNIFICANT CHANGE UP (ref 7–23)
BUN SERPL-MCNC: 13 MG/DL — SIGNIFICANT CHANGE UP (ref 7–23)
CALCIUM SERPL-MCNC: 8.8 MG/DL — SIGNIFICANT CHANGE UP (ref 8.4–10.5)
CALCIUM SERPL-MCNC: 8.9 MG/DL — SIGNIFICANT CHANGE UP (ref 8.4–10.5)
CHLORIDE SERPL-SCNC: 100 MMOL/L — SIGNIFICANT CHANGE UP (ref 96–108)
CHLORIDE SERPL-SCNC: 105 MMOL/L — SIGNIFICANT CHANGE UP (ref 96–108)
CO2 SERPL-SCNC: 28 MMOL/L — SIGNIFICANT CHANGE UP (ref 22–31)
CO2 SERPL-SCNC: 30 MMOL/L — SIGNIFICANT CHANGE UP (ref 22–31)
CREAT SERPL-MCNC: 1.07 MG/DL — SIGNIFICANT CHANGE UP (ref 0.5–1.3)
CREAT SERPL-MCNC: 1.22 MG/DL — SIGNIFICANT CHANGE UP (ref 0.5–1.3)
GLUCOSE SERPL-MCNC: 101 MG/DL — HIGH (ref 70–99)
GLUCOSE SERPL-MCNC: 80 MG/DL — SIGNIFICANT CHANGE UP (ref 70–99)
MAGNESIUM SERPL-MCNC: 2 MG/DL — SIGNIFICANT CHANGE UP (ref 1.6–2.6)
POTASSIUM SERPL-MCNC: 3.7 MMOL/L — SIGNIFICANT CHANGE UP (ref 3.5–5.3)
POTASSIUM SERPL-MCNC: 3.8 MMOL/L — SIGNIFICANT CHANGE UP (ref 3.5–5.3)
POTASSIUM SERPL-SCNC: 3.7 MMOL/L — SIGNIFICANT CHANGE UP (ref 3.5–5.3)
POTASSIUM SERPL-SCNC: 3.8 MMOL/L — SIGNIFICANT CHANGE UP (ref 3.5–5.3)
SODIUM SERPL-SCNC: 139 MMOL/L — SIGNIFICANT CHANGE UP (ref 135–145)
SODIUM SERPL-SCNC: 144 MMOL/L — SIGNIFICANT CHANGE UP (ref 135–145)

## 2021-03-14 PROCEDURE — 93010 ELECTROCARDIOGRAM REPORT: CPT

## 2021-03-14 RX ORDER — POTASSIUM CHLORIDE 20 MEQ
40 PACKET (EA) ORAL ONCE
Refills: 0 | Status: COMPLETED | OUTPATIENT
Start: 2021-03-14 | End: 2021-03-14

## 2021-03-14 RX ORDER — DILTIAZEM HCL 120 MG
10 CAPSULE, EXT RELEASE 24 HR ORAL ONCE
Refills: 0 | Status: COMPLETED | OUTPATIENT
Start: 2021-03-14 | End: 2021-03-14

## 2021-03-14 RX ADMIN — Medication 60 MILLIGRAM(S): at 06:02

## 2021-03-14 RX ADMIN — Medication 10 MILLILITER(S): at 13:07

## 2021-03-14 RX ADMIN — Medication 250 MICROGRAM(S): at 17:22

## 2021-03-14 RX ADMIN — ENOXAPARIN SODIUM 140 MILLIGRAM(S): 100 INJECTION SUBCUTANEOUS at 17:22

## 2021-03-14 RX ADMIN — Medication 60 MILLIGRAM(S): at 13:07

## 2021-03-14 RX ADMIN — Medication 100 MILLIGRAM(S): at 13:07

## 2021-03-14 RX ADMIN — Medication 60 MILLIGRAM(S): at 21:38

## 2021-03-14 RX ADMIN — Medication 10 MILLIGRAM(S): at 19:41

## 2021-03-14 RX ADMIN — ENOXAPARIN SODIUM 140 MILLIGRAM(S): 100 INJECTION SUBCUTANEOUS at 06:02

## 2021-03-14 RX ADMIN — Medication 100 MILLIGRAM(S): at 21:38

## 2021-03-14 RX ADMIN — Medication 10 MILLILITER(S): at 06:02

## 2021-03-14 RX ADMIN — Medication 10 MILLILITER(S): at 23:31

## 2021-03-14 RX ADMIN — Medication 40 MILLIEQUIVALENT(S): at 21:38

## 2021-03-14 RX ADMIN — Medication 100 MILLIGRAM(S): at 06:02

## 2021-03-14 RX ADMIN — Medication 10 MILLILITER(S): at 17:22

## 2021-03-14 NOTE — PROGRESS NOTE ADULT - SUBJECTIVE AND OBJECTIVE BOX
Subjective: Patient seen and examined. No new events except as noted.   remains on NRB   HR better controlled     REVIEW OF SYSTEMS:    CONSTITUTIONAL: + weakness, fevers or chills  EYES/ENT: No visual changes;  No vertigo or throat pain   NECK: No pain or stiffness  RESPIRATORY: No cough, wheezing, hemoptysis; No shortness of breath  CARDIOVASCULAR: No chest pain or palpitations  GASTROINTESTINAL: No abdominal or epigastric pain. No nausea, vomiting, or hematemesis; No diarrhea or constipation. No melena or hematochezia.  GENITOURINARY: No dysuria, frequency or hematuria  NEUROLOGICAL: No numbness or weakness  SKIN: No itching, burning, rashes, or lesions   All other review of systems is negative unless indicated above.    MEDICATIONS:  MEDICATIONS  (STANDING):  benzonatate 100 milliGRAM(s) Oral every 8 hours  digoxin     Tablet 250 MICROGram(s) Oral daily  diltiazem    Tablet 60 milliGRAM(s) Oral three times a day  enoxaparin Injectable 140 milliGRAM(s) SubCutaneous every 12 hours  guaifenesin/dextromethorphan  Syrup 10 milliLiter(s) Oral every 6 hours  sodium chloride 0.65% Nasal 1 Spray(s) Both Nostrils once      PHYSICAL EXAM:  T(C): 37 (03-14-21 @ 05:15), Max: 37 (03-14-21 @ 05:15)  HR: 87 (03-14-21 @ 09:31) (83 - 107)  BP: 122/71 (03-14-21 @ 05:15) (101/69 - 122/71)  RR: 22 (03-14-21 @ 09:31) (18 - 22)  SpO2: 91% (03-14-21 @ 09:31) (91% - 97%)  Wt(kg): --  I&O's Summary    13 Mar 2021 06:01  -  14 Mar 2021 07:00  --------------------------------------------------------  IN: 240 mL / OUT: 350 mL / NET: -110 mL          Appearance: NAD  NRB 	  HEENT:   Normal oral mucosa, PERRL, EOMI	  Lymphatic: No lymphadenopathy , no edema  Cardiovascular: Normal S1 S2, No JVD, No murmurs , Peripheral pulses palpable 2+ bilaterally  Respiratory: Lungs clear to auscultation, normal effort 	  Gastrointestinal:  Soft, Non-tender, + BS	  Skin: No rashes, No ecchymoses, No cyanosis, warm to touch  Musculoskeletal: Normal range of motion, normal strength  Psychiatry:  Mood & affect appropriate  Ext: No edema      LABS:    CARDIAC MARKERS:            03-14    144  |  105  |  11  ----------------------------<  80  3.8   |  28  |  1.07    Ca    8.8      14 Mar 2021 06:19  Phos  2.6     03-13  Mg     2.0     03-13      proBNP:   Lipid Profile:   HgA1c:   TSH:             TELEMETRY: 	    ECG:  	  RADIOLOGY:   DIAGNOSTIC TESTING:  [ ] Echocardiogram:  [ ]  Catheterization:  [ ] Stress Test:    OTHER:

## 2021-03-14 NOTE — PROGRESS NOTE ADULT - SUBJECTIVE AND OBJECTIVE BOX
Patient is a 65y old  Male who presents with a chief complaint of acute hypoxemic respiratory failure (14 Mar 2021 11:09)      SUBJECTIVE / OVERNIGHT EVENTS: remains on 100%NRB, w mild exertion develops Afib w RVR, BPis soft    MEDICATIONS  (STANDING):  benzonatate 100 milliGRAM(s) Oral every 8 hours  digoxin     Tablet 250 MICROGram(s) Oral daily  diltiazem    Tablet 60 milliGRAM(s) Oral three times a day  enoxaparin Injectable 140 milliGRAM(s) SubCutaneous every 12 hours  guaifenesin/dextromethorphan  Syrup 10 milliLiter(s) Oral every 6 hours  sodium chloride 0.65% Nasal 1 Spray(s) Both Nostrils once    MEDICATIONS  (PRN):  acetaminophen   Tablet .. 650 milliGRAM(s) Oral every 6 hours PRN Mild Pain (1 - 3)      Vital Signs Last 24 Hrs  T(F): 97.6 (03-14-21 @ 20:26), Max: 98.6 (03-14-21 @ 05:15)  HR: 117 (03-14-21 @ 22:35) (80 - 130)  BP: 98/62 (03-14-21 @ 20:26) (98/62 - 124/64)  RR: 18 (03-14-21 @ 20:26) (18 - 22)  SpO2: 96% (03-14-21 @ 22:35) (91% - 96%)  Telemetry:   CAPILLARY BLOOD GLUCOSE        I&O's Summary    13 Mar 2021 06:01  -  14 Mar 2021 07:00  --------------------------------------------------------  IN: 240 mL / OUT: 350 mL / NET: -110 mL    14 Mar 2021 07:01  -  14 Mar 2021 23:26  --------------------------------------------------------  IN: 0 mL / OUT: 250 mL / NET: -250 mL        PHYSICAL EXAM:  GENERAL: NAD, well-developed  HEAD:  Atraumatic, Normocephalic  EYES: EOMI, PERRLA, conjunctiva and sclera clear  NECK: Supple, No JVD  CHEST/LUNG: Clear to auscultation bilaterally; No wheeze  HEART: Regular rate and rhythm; No murmurs, rubs, or gallops  ABDOMEN: Soft, Nontender, Nondistended; Bowel sounds present  EXTREMITIES:  2+ Peripheral Pulses, No clubbing, cyanosis, or edema  PSYCH: AAOx3  NEUROLOGY: non-focal  SKIN: No rashes or lesions    LABS:    03-14    139  |  100  |  13  ----------------------------<  101<H>  3.7   |  30  |  1.22    Ca    8.9      14 Mar 2021 20:41  Phos  2.6     03-13  Mg     2.0     03-14                RADIOLOGY & ADDITIONAL TESTS:    Imaging Personally Reviewed:    Consultant(s) Notes Reviewed:      Care Discussed with Consultants/Other Providers:

## 2021-03-15 LAB
ALBUMIN SERPL ELPH-MCNC: 2.7 G/DL — LOW (ref 3.3–5)
ALP SERPL-CCNC: 47 U/L — SIGNIFICANT CHANGE UP (ref 40–120)
ALT FLD-CCNC: 29 U/L — SIGNIFICANT CHANGE UP (ref 10–45)
ANION GAP SERPL CALC-SCNC: 14 MMOL/L — SIGNIFICANT CHANGE UP (ref 5–17)
AST SERPL-CCNC: 29 U/L — SIGNIFICANT CHANGE UP (ref 10–40)
BASOPHILS # BLD AUTO: 0.03 K/UL — SIGNIFICANT CHANGE UP (ref 0–0.2)
BASOPHILS NFR BLD AUTO: 0.5 % — SIGNIFICANT CHANGE UP (ref 0–2)
BILIRUB SERPL-MCNC: 0.4 MG/DL — SIGNIFICANT CHANGE UP (ref 0.2–1.2)
BUN SERPL-MCNC: 12 MG/DL — SIGNIFICANT CHANGE UP (ref 7–23)
CALCIUM SERPL-MCNC: 9 MG/DL — SIGNIFICANT CHANGE UP (ref 8.4–10.5)
CHLORIDE SERPL-SCNC: 103 MMOL/L — SIGNIFICANT CHANGE UP (ref 96–108)
CO2 SERPL-SCNC: 24 MMOL/L — SIGNIFICANT CHANGE UP (ref 22–31)
CREAT SERPL-MCNC: 0.99 MG/DL — SIGNIFICANT CHANGE UP (ref 0.5–1.3)
CRP SERPL-MCNC: 4.06 MG/DL — HIGH (ref 0–0.4)
D DIMER BLD IA.RAPID-MCNC: 192 NG/ML DDU — SIGNIFICANT CHANGE UP
DIGOXIN SERPL-MCNC: 0.7 NG/ML — LOW (ref 0.8–2)
EOSINOPHIL # BLD AUTO: 0.16 K/UL — SIGNIFICANT CHANGE UP (ref 0–0.5)
EOSINOPHIL NFR BLD AUTO: 2.6 % — SIGNIFICANT CHANGE UP (ref 0–6)
FERRITIN SERPL-MCNC: 578 NG/ML — HIGH (ref 30–400)
GLUCOSE SERPL-MCNC: 72 MG/DL — SIGNIFICANT CHANGE UP (ref 70–99)
HCT VFR BLD CALC: 41.1 % — SIGNIFICANT CHANGE UP (ref 39–50)
HGB BLD-MCNC: 12.9 G/DL — LOW (ref 13–17)
IMM GRANULOCYTES NFR BLD AUTO: 0.3 % — SIGNIFICANT CHANGE UP (ref 0–1.5)
LDH SERPL L TO P-CCNC: 400 U/L — HIGH (ref 50–242)
LYMPHOCYTES # BLD AUTO: 1.42 K/UL — SIGNIFICANT CHANGE UP (ref 1–3.3)
LYMPHOCYTES # BLD AUTO: 23.5 % — SIGNIFICANT CHANGE UP (ref 13–44)
MAGNESIUM SERPL-MCNC: 2.1 MG/DL — SIGNIFICANT CHANGE UP (ref 1.6–2.6)
MCHC RBC-ENTMCNC: 29.5 PG — SIGNIFICANT CHANGE UP (ref 27–34)
MCHC RBC-ENTMCNC: 31.4 GM/DL — LOW (ref 32–36)
MCV RBC AUTO: 93.8 FL — SIGNIFICANT CHANGE UP (ref 80–100)
MONOCYTES # BLD AUTO: 0.68 K/UL — SIGNIFICANT CHANGE UP (ref 0–0.9)
MONOCYTES NFR BLD AUTO: 11.2 % — SIGNIFICANT CHANGE UP (ref 2–14)
NEUTROPHILS # BLD AUTO: 3.74 K/UL — SIGNIFICANT CHANGE UP (ref 1.8–7.4)
NEUTROPHILS NFR BLD AUTO: 61.9 % — SIGNIFICANT CHANGE UP (ref 43–77)
NRBC # BLD: 0 /100 WBCS — SIGNIFICANT CHANGE UP (ref 0–0)
PHOSPHATE SERPL-MCNC: 2.7 MG/DL — SIGNIFICANT CHANGE UP (ref 2.5–4.5)
PLATELET # BLD AUTO: 229 K/UL — SIGNIFICANT CHANGE UP (ref 150–400)
POTASSIUM SERPL-MCNC: 4.4 MMOL/L — SIGNIFICANT CHANGE UP (ref 3.5–5.3)
POTASSIUM SERPL-SCNC: 4.4 MMOL/L — SIGNIFICANT CHANGE UP (ref 3.5–5.3)
PROT SERPL-MCNC: 6.6 G/DL — SIGNIFICANT CHANGE UP (ref 6–8.3)
RBC # BLD: 4.38 M/UL — SIGNIFICANT CHANGE UP (ref 4.2–5.8)
RBC # FLD: 13.3 % — SIGNIFICANT CHANGE UP (ref 10.3–14.5)
SODIUM SERPL-SCNC: 141 MMOL/L — SIGNIFICANT CHANGE UP (ref 135–145)
WBC # BLD: 6.05 K/UL — SIGNIFICANT CHANGE UP (ref 3.8–10.5)
WBC # FLD AUTO: 6.05 K/UL — SIGNIFICANT CHANGE UP (ref 3.8–10.5)

## 2021-03-15 RX ADMIN — Medication 100 MILLIGRAM(S): at 13:11

## 2021-03-15 RX ADMIN — ENOXAPARIN SODIUM 140 MILLIGRAM(S): 100 INJECTION SUBCUTANEOUS at 18:10

## 2021-03-15 RX ADMIN — ENOXAPARIN SODIUM 140 MILLIGRAM(S): 100 INJECTION SUBCUTANEOUS at 05:35

## 2021-03-15 RX ADMIN — Medication 10 MILLILITER(S): at 05:35

## 2021-03-15 RX ADMIN — Medication 60 MILLIGRAM(S): at 13:12

## 2021-03-15 RX ADMIN — Medication 60 MILLIGRAM(S): at 21:40

## 2021-03-15 RX ADMIN — Medication 100 MILLIGRAM(S): at 21:40

## 2021-03-15 RX ADMIN — Medication 100 MILLIGRAM(S): at 05:35

## 2021-03-15 RX ADMIN — Medication 10 MILLILITER(S): at 13:12

## 2021-03-15 RX ADMIN — Medication 10 MILLILITER(S): at 18:10

## 2021-03-15 RX ADMIN — Medication 250 MICROGRAM(S): at 18:10

## 2021-03-15 RX ADMIN — Medication 60 MILLIGRAM(S): at 05:35

## 2021-03-15 NOTE — PROGRESS NOTE ADULT - SUBJECTIVE AND OBJECTIVE BOX
Infectious Diseases progress note:    Subjective: Pt seen earlier today.  On NRB, afebrile, NAD    ROS:  CONSTITUTIONAL:  No fever, chills, rigors  CARDIOVASCULAR:  No chest pain or palpitations  RESPIRATORY:   No SOB, cough, dyspnea on exertion.  No wheezing  GASTROINTESTINAL:  No abd pain, N/V, diarrhea/constipation  EXTREMITIES:  No swelling or joint pain  GENITOURINARY:  No burning on urination, increased frequency or urgency.  No flank pain  NEUROLOGIC:  No HA, visual disturbances  SKIN: No rashes    Allergies    Actifed (Headache)  Sudafed (Headache)    Intolerances        ANTIBIOTICS/RELEVANT:  antimicrobials    immunologic:    OTHER:  acetaminophen   Tablet .. 650 milliGRAM(s) Oral every 6 hours PRN  benzonatate 100 milliGRAM(s) Oral every 8 hours  diltiazem    Tablet 60 milliGRAM(s) Oral three times a day  enoxaparin Injectable 140 milliGRAM(s) SubCutaneous every 12 hours  guaifenesin/dextromethorphan  Syrup 10 milliLiter(s) Oral every 6 hours  sodium chloride 0.65% Nasal 1 Spray(s) Both Nostrils once      Objective:  Vital Signs Last 24 Hrs  T(C): 36.8 (15 Mar 2021 11:13), Max: 36.9 (15 Mar 2021 04:54)  T(F): 98.3 (15 Mar 2021 11:13), Max: 98.5 (15 Mar 2021 04:54)  HR: 98 (15 Mar 2021 18:10) (83 - 117)  BP: 103/68 (15 Mar 2021 18:10) (103/54 - 126/70)  BP(mean): --  RR: 19 (15 Mar 2021 11:13) (18 - 19)  SpO2: 96% (15 Mar 2021 14:30) (90% - 96%)    PHYSICAL EXAM:  Constitutional:NAD  Eyes:KRISTEN, EOMI  Ear/Nose/Throat: no thrush, mucositis.  Moist mucous membranes	  Neck:no JVD, no lymphadenopathy, supple  Respiratory: CTA berna  Cardiovascular: S1S2 RRR, no murmurs  Gastrointestinal:soft, nontender,  nondistended (+) BS  Extremities:no e/e/c  Skin:  no rashes, open wounds or ulcerations        LABS:                        12.9   6.05  )-----------( 229      ( 15 Mar 2021 07:24 )             41.1     03-15    141  |  103  |  12  ----------------------------<  72  4.4   |  24  |  0.99    Ca    9.0      15 Mar 2021 07:24  Phos  2.7     03-15  Mg     2.1     03-15    TPro  6.6  /  Alb  2.7<L>  /  TBili  0.4  /  DBili  x   /  AST  29  /  ALT  29  /  AlkPhos  47  03-15          Procalcitonin, Serum: 0.58 (02-27 @ 13:25)                    MICROBIOLOGY:          RADIOLOGY & ADDITIONAL STUDIES:

## 2021-03-15 NOTE — PROGRESS NOTE ADULT - PROBLEM SELECTOR PLAN 2
Completed Remdesivir   cont decadron   Pulm follow up   Lovenox BID Completed Remdesivir and decadron   Pulm follow up   Lovenox BID

## 2021-03-15 NOTE — PROVIDER CONTACT NOTE (OTHER) - ACTION/TREATMENT ORDERED:
EKG done. Cont. to monitor pt. on Tele/ ANP.
PA notified and aware. As per PA, notify if HR sustained above 130s.
continue to monitor
rn placed patient on non rebreather/ will report to night team
FLORENCE Oneill aware. Patient on tele, will continue to monitor.
Hua BAUMANN aware. Patient on tele, will continue to monitor.
PA and Respiratory notified. Will keep CPAP machine at bedside on standby just in case. Will continue to closely monitor pt.
Will continue to closely monitor pt as per PA.
mg and phos added. will continue to monitor for further episodes
NP aware, continue to monitor
NP notified. Obtain VS and administer Cardizem IV push as per order.
cardizem ivp

## 2021-03-15 NOTE — PROGRESS NOTE ADULT - ASSESSMENT
65y Male, hx of morbid obesity, HTN on lisinopril, complaining of shortness of breath COVID-19 positive s/p steroids and Remdesivir, hospital course complicated by new oxygen requirement and Afib w RVR. Ep consulted for further management. Pt self-converted to NSR 60s-90s this afternoon; duration of Afib indeterminate  [id number] score 1    # AFib w/ RVR  - Pt currently in NSR 90s-110  - On Diltiazem 60mg tid, consider changing to long acting Diltiazem ER 240mg qd on discharge  - On Lovenox for AC, consider switching to NOAC, with normal serum Cr level  - Will discuss with EP attending regarding antiarrhythmic in maintaining sinus rhythm    JERICHO Arroyo PA-C  [phone] 65y Male, hx of morbid obesity, HTN on lisinopril, complaining of shortness of breath COVID-19 positive s/p steroids and Remdesivir, hospital course complicated by new oxygen requirement and Afib w RVR. Ep consulted for further management. Pt self-converted to NSR 60s-90s this afternoon; duration of Afib indeterminate  [id number] score 1    # AFib w/ RVR  - Pt currently in NSR 90s-110 with episodes of AFib w/RVR  - Since pt not rate controlled on diltiazem, consider switching to metoprolol 25mg q6h   - d/c digoxin  - On Lovenox for AC, consider switching to NOAC, with normal serum Cr level  - EP to signoff. Reconsult PRN.       JERICHO Arroyo PA-C  [phone]

## 2021-03-15 NOTE — PROVIDER CONTACT NOTE (OTHER) - ASSESSMENT
Pt axox4. Pt is currently on NRB 15L O2 sat >90. Pt denies SOB. Pt states he feels more comfortable with NRB and does not need the CPAP for night-time.
Pt is Axox4. Denies chest pain, SOB, palpitations or tinging in fingers/toes. Pt states he has been just coughing up mucous all night.
Pt is afib on tele. no s/s of distress noted. VSS hr on tele in the 130's 140's.
Pt is AXOX4, no complaints at this time. Denies chest pain, SOB, palpitations, or dizziness.
Pt. eith no c/o sob/ chest pain.
A/Ox4, asymptomatic. Vital signs recorded as per flowsheet. Patient does not complain of pain or discomfort.
Pt is AXO4, denies chest pain, dizziness, lightheadedness, palpitations, and SOB. No complaints at this time.
A/Ox4, asymptomatic. Vital signs recorded as per flowsheet. Patient does not complain of pain or discomfort.
Pt alternating between NS 60-90 and afib 100-120s  PT AO4 VSS pt is asymptomatic, denies cp dizziness pain or palpitations on NRB 10L
VSS, no c/o palpitation, or chest pain. Pt resting at time of event. sob noted. pt on high flow n/c at this time
bp 111/82 hr 82. pt now normal sinus rhythm 80's.
patient alertx3,wore high flow for 30-45 minutes and said its very uncomfortable and requesting non rebreather

## 2021-03-15 NOTE — PROGRESS NOTE ADULT - SUBJECTIVE AND OBJECTIVE BOX
Subjective: Patient seen and examined. No new events except as noted.   remains on NRB   now in SR since this am       REVIEW OF SYSTEMS:    CONSTITUTIONAL: + weakness, fevers or chills  EYES/ENT: No visual changes;  No vertigo or throat pain   NECK: No pain or stiffness  RESPIRATORY: No cough, wheezing, hemoptysis; No shortness of breath  CARDIOVASCULAR: No chest pain or palpitations  GASTROINTESTINAL: No abdominal or epigastric pain. No nausea, vomiting, or hematemesis; No diarrhea or constipation. No melena or hematochezia.  GENITOURINARY: No dysuria, frequency or hematuria  NEUROLOGICAL: No numbness or weakness  SKIN: No itching, burning, rashes, or lesions   All other review of systems is negative unless indicated above.    MEDICATIONS:  MEDICATIONS  (STANDING):  benzonatate 100 milliGRAM(s) Oral every 8 hours  digoxin     Tablet 250 MICROGram(s) Oral daily  diltiazem    Tablet 60 milliGRAM(s) Oral three times a day  enoxaparin Injectable 140 milliGRAM(s) SubCutaneous every 12 hours  guaifenesin/dextromethorphan  Syrup 10 milliLiter(s) Oral every 6 hours  sodium chloride 0.65% Nasal 1 Spray(s) Both Nostrils once      PHYSICAL EXAM:  T(C): 36.8 (03-15-21 @ 11:13), Max: 36.9 (03-15-21 @ 04:54)  HR: 83 (03-15-21 @ 11:13) (80 - 130)  BP: 103/54 (03-15-21 @ 11:13) (98/62 - 126/70)  RR: 19 (03-15-21 @ 11:13) (18 - 19)  SpO2: 96% (03-15-21 @ 11:13) (90% - 96%)  Wt(kg): --  I&O's Summary    14 Mar 2021 07:01  -  15 Mar 2021 07:00  --------------------------------------------------------  IN: 0 mL / OUT: 550 mL / NET: -550 mL    15 Mar 2021 07:01  -  15 Mar 2021 11:25  --------------------------------------------------------  IN: 600 mL / OUT: 250 mL / NET: 350 mL          Appearance: NAD NRB  	  HEENT:   Normal oral mucosa, PERRL, EOMI	  Lymphatic: No lymphadenopathy , no edema  Cardiovascular: Normal S1 S2, No JVD, No murmurs , Peripheral pulses palpable 2+ bilaterally  Respiratory: Lungs clear to auscultation, normal effort 	  Gastrointestinal:  Soft, Non-tender, + BS	  Skin: No rashes, No ecchymoses, No cyanosis, warm to touch  Musculoskeletal: Normal range of motion, normal strength  Psychiatry:  Mood & affect appropriate  Ext: No edema      LABS:    CARDIAC MARKERS:                                12.9   6.05  )-----------( 229      ( 15 Mar 2021 07:24 )             41.1     03-15    141  |  103  |  12  ----------------------------<  72  4.4   |  24  |  0.99    Ca    9.0      15 Mar 2021 07:24  Phos  2.7     03-15  Mg     2.1     03-15    TPro  6.6  /  Alb  2.7<L>  /  TBili  0.4  /  DBili  x   /  AST  29  /  ALT  29  /  AlkPhos  47  03-15    proBNP:   Lipid Profile:   HgA1c:   TSH:             TELEMETRY: 	AF, SR     ECG:  	  RADIOLOGY:   DIAGNOSTIC TESTING:  [ ] Echocardiogram:  [ ]  Catheterization:  [ ] Stress Test:    OTHER:

## 2021-03-15 NOTE — PROGRESS NOTE ADULT - ASSESSMENT
The patient is a 65y Male, hx of morbid obesity, HTN on lisinopril, complaining of shortness of breath. covid + dx 5d prior. no home O2 requirement, former smoker. has dyspnea at rest and on exertion. has not taken steroids, no hospitalization for covid. denies CP. [facility] (27 Feb 2021 22:29)    ER vital:  T 98.4, P 178, /85.  Pt satting 85% on RA, placed on nasal cannula, advanced to NRB.  Covid pcr (+), covid serologies (-).   CT angio chest (-) for PE, (+) b/l patchy opacities.  Pt with elevated AST (>5x ULN but <10x) and ALT.   Pt started on remdesivir and dexamethasone.     Acute covid illness:    - Cont remdesivir and dexamethasone.  Monitor daily LFTs and renal function while pt on remdesivir.  LFTs elevated, possibly secondary to viral infection.  Currently ALT < 10x ULN, can cont remdesivir.  LFTs slowly improving.     - Monitor pulse ox, pt on supplemental O2 - now on Bipap    - Monitor inflammatory markers q72    DD:  272 <-- 359 <-- 248 <-- 213 <-- 282 <-- 343  ferritin: 666 <-- 692 <-- 1468 <-- 1636  pct:  0.58  crp: 3.0 <-- 2.2 <-- 10.3 <-- 10.9    - Pt on full dose lovenox for dvt/pe.  CT angio chest (-) for PE.   LE dopplers neg DVT    *  Repeat cxr 3/8 with continued b/l patchy opacities with slight worsening.  No indication for abx at this time.  Pt on NRB.  No indication for abx at this time.     [facility] [place]  [phone]

## 2021-03-15 NOTE — PROVIDER CONTACT NOTE (OTHER) - BACKGROUND
Acute Hypoxic Resp Failure sec toCOVID s/p RDV (2/28 - 3/4) Decadron (2/27 - 3/8)         AF with RVR --> NSR on PO Cardizem, Lovenox Full AC
Acute Hypoxic Resp Failure sec toCOVID s/p RDV (2/28 - 3/4) Decadron (2/27 - 3/8)         AF with RVR --> NSR on PO Cardizem, Lovenox Full AC
afib, hypoxic resp failure.
Pt admitted w/ A-fib RVR. Covid +
admitting dx Atrial fibrillation
Male, hx of HTN on lisinopril, complaining of shortness of breath. covid + dx 5d prior. no home O2 requirement, former smoker
admitting dx Atrial fibrillation
admitting dx Atrial fibrillation
afib, hypoxic resp failure
Pt new onset afib this admission started on lovenox  covid +
atrial fibrillation, acute hypoxic resp faiulure

## 2021-03-15 NOTE — PROGRESS NOTE ADULT - SUBJECTIVE AND OBJECTIVE BOX
Patient is a 65y old  Male who presents with a chief complaint of acute hypoxemic respiratory failure (15 Mar 2021 21:03)      SUBJECTIVE / OVERNIGHT EVENTS: back in NSR remains on NRB    MEDICATIONS  (STANDING):  benzonatate 100 milliGRAM(s) Oral every 8 hours  diltiazem    Tablet 60 milliGRAM(s) Oral three times a day  enoxaparin Injectable 140 milliGRAM(s) SubCutaneous every 12 hours  guaifenesin/dextromethorphan  Syrup 10 milliLiter(s) Oral every 6 hours  sodium chloride 0.65% Nasal 1 Spray(s) Both Nostrils once    MEDICATIONS  (PRN):  acetaminophen   Tablet .. 650 milliGRAM(s) Oral every 6 hours PRN Mild Pain (1 - 3)      Vital Signs Last 24 Hrs  T(F): 97.9 (03-15-21 @ 21:53), Max: 98.5 (03-15-21 @ 04:54)  HR: 93 (03-15-21 @ 21:53) (83 - 99)  BP: 116/77 (03-15-21 @ 21:53) (103/54 - 123/81)  RR: 19 (03-15-21 @ 21:53) (18 - 19)  SpO2: 96% (03-15-21 @ 21:53) (90% - 96%)  Telemetry:   CAPILLARY BLOOD GLUCOSE        I&O's Summary    14 Mar 2021 07:01  -  15 Mar 2021 07:00  --------------------------------------------------------  IN: 0 mL / OUT: 550 mL / NET: -550 mL    15 Mar 2021 07:01  -  15 Mar 2021 22:48  --------------------------------------------------------  IN: 1100 mL / OUT: 700 mL / NET: 400 mL        PHYSICAL EXAM:  GENERAL: NAD, well-developed  HEAD:  Atraumatic, Normocephalic  EYES: EOMI, PERRLA, conjunctiva and sclera clear  NECK: Supple, No JVD  CHEST/LUNG: Clear to auscultation bilaterally; No wheeze  HEART: Regular rate and rhythm; No murmurs, rubs, or gallops  ABDOMEN: Soft, Nontender, Nondistended; Bowel sounds present  EXTREMITIES:  2+ Peripheral Pulses, No clubbing, cyanosis, or edema  PSYCH: AAOx3  NEUROLOGY: non-focal  SKIN: No rashes or lesions    LABS:                        12.9   6.05  )-----------( 229      ( 15 Mar 2021 07:24 )             41.1     03-15    141  |  103  |  12  ----------------------------<  72  4.4   |  24  |  0.99    Ca    9.0      15 Mar 2021 07:24  Phos  2.7     03-15  Mg     2.1     03-15    TPro  6.6  /  Alb  2.7<L>  /  TBili  0.4  /  DBili  x   /  AST  29  /  ALT  29  /  AlkPhos  47  03-15              RADIOLOGY & ADDITIONAL TESTS:    Imaging Personally Reviewed:    Consultant(s) Notes Reviewed:      Care Discussed with Consultants/Other Providers:

## 2021-03-15 NOTE — PROGRESS NOTE ADULT - ASSESSMENT
ACute hypoxemic respiratory failure  Obesity  Multilobar viral pneumonia due to COVID 19  Atrial Fibrillation: now back in NSR    REC    Transition to ventimask then nasal cannula as tolerated once AF controlled  Cardiology f/u

## 2021-03-15 NOTE — PROGRESS NOTE ADULT - SUBJECTIVE AND OBJECTIVE BOX
24H hour events: Pt currently on NRM. No acute complaints. Overnight tele: AFib 100-150s until 3/15 morning.    MEDICATIONS:  digoxin     Tablet 250 MICROGram(s) Oral daily  diltiazem    Tablet 60 milliGRAM(s) Oral three times a day  enoxaparin Injectable 140 milliGRAM(s) SubCutaneous every 12 hours  benzonatate 100 milliGRAM(s) Oral every 8 hours  guaifenesin/dextromethorphan  Syrup 10 milliLiter(s) Oral every 6 hours  acetaminophen   Tablet .. 650 milliGRAM(s) Oral every 6 hours PRN  sodium chloride 0.65% Nasal 1 Spray(s) Both Nostrils once      REVIEW OF SYSTEMS:  See HPI, otherwise ROS negative.    PHYSICAL EXAM:  T(C): 36.9 (03-15-21 @ 04:54), Max: 36.9 (03-15-21 @ 04:54)  HR: 93 (03-15-21 @ 04:54) (80 - 130)  BP: 123/81 (03-15-21 @ 04:54) (98/62 - 126/70)  RR: 18 (03-15-21 @ 04:54) (18 - 20)  SpO2: 95% (03-15-21 @ 07:23) (90% - 96%)  Wt(kg): --  I&O's Summary    14 Mar 2021 07:01  -  15 Mar 2021 07:00  --------------------------------------------------------  IN: 0 mL / OUT: 550 mL / NET: -550 mL    15 Mar 2021 07:01  -  15 Mar 2021 10:36  --------------------------------------------------------  IN: 600 mL / OUT: 250 mL / NET: 350 mL      EXAM: per primary team      LABS:	 	    CBC Full  -  ( 15 Mar 2021 07:24 )  WBC Count : 6.05 K/uL  Hemoglobin : 12.9 g/dL  Hematocrit : 41.1 %  Platelet Count - Automated : 229 K/uL  Mean Cell Volume : 93.8 fl  Mean Cell Hemoglobin : 29.5 pg  Mean Cell Hemoglobin Concentration : 31.4 gm/dL  Auto Neutrophil # : 3.74 K/uL  Auto Lymphocyte # : 1.42 K/uL  Auto Monocyte # : 0.68 K/uL  Auto Eosinophil # : 0.16 K/uL  Auto Basophil # : 0.03 K/uL  Auto Neutrophil % : 61.9 %  Auto Lymphocyte % : 23.5 %  Auto Monocyte % : 11.2 %  Auto Eosinophil % : 2.6 %  Auto Basophil % : 0.5 %    03-15    141  |  103  |  12  ----------------------------<  72  4.4   |  24  |  0.99  03-14    139  |  100  |  13  ----------------------------<  101<H>  3.7   |  30  |  1.22    Ca    9.0      15 Mar 2021 07:24  Ca    8.9      14 Mar 2021 20:41  Phos  2.7     03-15  Mg     2.1     03-15  Mg     2.0     03-14    TPro  6.6  /  Alb  2.7<L>  /  TBili  0.4  /  DBili  x   /  AST  29  /  ALT  29  /  AlkPhos  47  03-15      TELEMETRY: SR 90-110s  3/13-3/14 am & 3/14 pm - 3/15 am: AFib 100-150  	    ECG: personally reviewed

## 2021-03-15 NOTE — PROGRESS NOTE ADULT - SUBJECTIVE AND OBJECTIVE BOX
Follow-up Pulm Progress Note  Francisco Ramon MD  [phone]    OOB in chair: again on NRB with sats ranging 90-95%  Episodes of AF with RVR  No resp complaints  D-dimer now normal      Vital Signs Last 24 Hrs  T(C): 36.8 (15 Mar 2021 11:13), Max: 36.9 (15 Mar 2021 04:54)  T(F): 98.3 (15 Mar 2021 11:13), Max: 98.5 (15 Mar 2021 04:54)  HR: 99 (15 Mar 2021 13:02) (80 - 130)  BP: 107/76 (15 Mar 2021 13:02) (98/62 - 126/70)  BP(mean): --  RR: 19 (15 Mar 2021 11:13) (18 - 19)  SpO2: 96% (15 Mar 2021 11:13) (90% - 96%)                          12.9   6.05  )-----------( 229      ( 15 Mar 2021 07:24 )             41.1       03-15    141  |  103  |  12  ----------------------------<  72  4.4   |  24  |  0.99    Ca    9.0      15 Mar 2021 07:24  Phos  2.7     03-15  Mg     2.1     03-15    TPro  6.6  /  Alb  2.7<L>  /  TBili  0.4  /  DBili  x   /  AST  29  /  ALT  29  /  AlkPhos  47  03-15      CULTURES:  Culture Results:   No growth to date. (02-27 @ 18:02)  Culture Results:   No growth to date. (02-27 @ 18:02)    Most recent blood culture -- 02-27 @ 18:02   -- -- .Blood Blood 02-27 @ 18:02      Physical Examination:  PULM: No wheeze  CVS: Regular rate and rhythm, no murmurs, rubs, or gallops  ABD: Soft, non-tender  EXT:  No clubbing, cyanosis, or edema    RADIOLOGY REVIEWED  CXR:    CT chest:    PROCEDURE DATE:  02/27/2021        INTERPRETATION:  CLINICAL INFORMATION: Hypoxia, Covid 19 infection, assess for pulmonary embolism    COMPARISON: Chest radiograph from same day    PROCEDURE:  CT Angiography of the Chest.  90 ml of Omnipaque 350 was injected intravenously. 10 ml were discarded.  Sagittal and coronal reformats were performed as well as 3D (MIP) reconstructions.    FINDINGS:    LUNGS AND AIRWAYS: Patent central airways.  Bilateral patchy groundglass and consolidative opacities with peripheral and lower lobe predominance.  PLEURA: No pleural effusion.  MEDIASTINUM AND KASIE: No lymphadenopathy.  VESSELS: No central, main, lobar, or segmental pulmonary embolism. Evaluation of the subsegmental pulmonary arteries is limited/nondiagnostic secondary to respiratory motion.  HEART: Heart size is normal. No pericardial effusion.  CHEST WALL AND LOWER NECK: Prominent right upper paratracheal lymph node may be reactive  VISUALIZED UPPER ABDOMEN: Small hiatal hernia.  BONES: Spinal degenerative changes.    IMPRESSION:  No central, main, lobar, or segmental pulmonary embolism. Evaluation of the subsegmental pulmonary arteries is limited/nondiagnostic secondary to respiratory motion.    Bilateral patchy groundglass and consolidative opacities with peripheral and lower lobe predominance, compatible with patient's known Covid 19 infection. Follow-up to resolution recommended.    TTE:

## 2021-03-16 LAB
ALBUMIN SERPL ELPH-MCNC: 2.7 G/DL — LOW (ref 3.3–5)
ALP SERPL-CCNC: 44 U/L — SIGNIFICANT CHANGE UP (ref 40–120)
ALT FLD-CCNC: 27 U/L — SIGNIFICANT CHANGE UP (ref 10–45)
ANION GAP SERPL CALC-SCNC: 12 MMOL/L — SIGNIFICANT CHANGE UP (ref 5–17)
AST SERPL-CCNC: 21 U/L — SIGNIFICANT CHANGE UP (ref 10–40)
BILIRUB SERPL-MCNC: 0.4 MG/DL — SIGNIFICANT CHANGE UP (ref 0.2–1.2)
BUN SERPL-MCNC: 12 MG/DL — SIGNIFICANT CHANGE UP (ref 7–23)
CALCIUM SERPL-MCNC: 9 MG/DL — SIGNIFICANT CHANGE UP (ref 8.4–10.5)
CHLORIDE SERPL-SCNC: 104 MMOL/L — SIGNIFICANT CHANGE UP (ref 96–108)
CO2 SERPL-SCNC: 27 MMOL/L — SIGNIFICANT CHANGE UP (ref 22–31)
CREAT SERPL-MCNC: 0.99 MG/DL — SIGNIFICANT CHANGE UP (ref 0.5–1.3)
GLUCOSE SERPL-MCNC: 84 MG/DL — SIGNIFICANT CHANGE UP (ref 70–99)
HCT VFR BLD CALC: 37.2 % — LOW (ref 39–50)
HGB BLD-MCNC: 11.9 G/DL — LOW (ref 13–17)
MAGNESIUM SERPL-MCNC: 1.9 MG/DL — SIGNIFICANT CHANGE UP (ref 1.6–2.6)
MCHC RBC-ENTMCNC: 29.8 PG — SIGNIFICANT CHANGE UP (ref 27–34)
MCHC RBC-ENTMCNC: 32 GM/DL — SIGNIFICANT CHANGE UP (ref 32–36)
MCV RBC AUTO: 93 FL — SIGNIFICANT CHANGE UP (ref 80–100)
NRBC # BLD: 0 /100 WBCS — SIGNIFICANT CHANGE UP (ref 0–0)
PLATELET # BLD AUTO: 203 K/UL — SIGNIFICANT CHANGE UP (ref 150–400)
POTASSIUM SERPL-MCNC: 3.8 MMOL/L — SIGNIFICANT CHANGE UP (ref 3.5–5.3)
POTASSIUM SERPL-SCNC: 3.8 MMOL/L — SIGNIFICANT CHANGE UP (ref 3.5–5.3)
PROT SERPL-MCNC: 6.2 G/DL — SIGNIFICANT CHANGE UP (ref 6–8.3)
RBC # BLD: 4 M/UL — LOW (ref 4.2–5.8)
RBC # FLD: 13.2 % — SIGNIFICANT CHANGE UP (ref 10.3–14.5)
SODIUM SERPL-SCNC: 143 MMOL/L — SIGNIFICANT CHANGE UP (ref 135–145)
WBC # BLD: 4.98 K/UL — SIGNIFICANT CHANGE UP (ref 3.8–10.5)
WBC # FLD AUTO: 4.98 K/UL — SIGNIFICANT CHANGE UP (ref 3.8–10.5)

## 2021-03-16 RX ORDER — METOPROLOL TARTRATE 50 MG
25 TABLET ORAL EVERY 6 HOURS
Refills: 0 | Status: DISCONTINUED | OUTPATIENT
Start: 2021-03-16 | End: 2021-03-17

## 2021-03-16 RX ADMIN — Medication 10 MILLILITER(S): at 00:36

## 2021-03-16 RX ADMIN — Medication 100 MILLIGRAM(S): at 13:48

## 2021-03-16 RX ADMIN — Medication 10 MILLILITER(S): at 23:36

## 2021-03-16 RX ADMIN — Medication 25 MILLIGRAM(S): at 13:44

## 2021-03-16 RX ADMIN — Medication 25 MILLIGRAM(S): at 18:29

## 2021-03-16 RX ADMIN — Medication 10 MILLILITER(S): at 05:32

## 2021-03-16 RX ADMIN — Medication 100 MILLIGRAM(S): at 22:41

## 2021-03-16 RX ADMIN — Medication 25 MILLIGRAM(S): at 23:36

## 2021-03-16 RX ADMIN — Medication 100 MILLIGRAM(S): at 05:33

## 2021-03-16 RX ADMIN — ENOXAPARIN SODIUM 140 MILLIGRAM(S): 100 INJECTION SUBCUTANEOUS at 18:30

## 2021-03-16 RX ADMIN — Medication 60 MILLIGRAM(S): at 05:33

## 2021-03-16 RX ADMIN — Medication 10 MILLILITER(S): at 13:43

## 2021-03-16 RX ADMIN — Medication 10 MILLILITER(S): at 18:30

## 2021-03-16 RX ADMIN — ENOXAPARIN SODIUM 140 MILLIGRAM(S): 100 INJECTION SUBCUTANEOUS at 05:32

## 2021-03-16 NOTE — PROGRESS NOTE ADULT - SUBJECTIVE AND OBJECTIVE BOX
Patient is a 65y old  Male who presents with a chief complaint of acute hypoxemic respiratory failure (16 Mar 2021 13:00)      SUBJECTIVE / OVERNIGHT EVENTS: remains on 100% NRB, afib in rvr, on BB, off Cardizem, off Digoxin    MEDICATIONS  (STANDING):  benzonatate 100 milliGRAM(s) Oral every 8 hours  enoxaparin Injectable 140 milliGRAM(s) SubCutaneous every 12 hours  guaifenesin/dextromethorphan  Syrup 10 milliLiter(s) Oral every 6 hours  metoprolol tartrate 25 milliGRAM(s) Oral every 6 hours  sodium chloride 0.65% Nasal 1 Spray(s) Both Nostrils once    MEDICATIONS  (PRN):  acetaminophen   Tablet .. 650 milliGRAM(s) Oral every 6 hours PRN Mild Pain (1 - 3)      Vital Signs Last 24 Hrs  T(F): 98.3 (03-16-21 @ 11:31), Max: 98.6 (03-16-21 @ 04:52)  HR: 115 (03-16-21 @ 11:31) (93 - 115)  BP: 113/72 (03-16-21 @ 11:31) (113/72 - 127/80)  RR: 20 (03-16-21 @ 18:00) (18 - 20)  SpO2: 97% (03-16-21 @ 18:00) (92% - 97%)  Telemetry:   CAPILLARY BLOOD GLUCOSE        I&O's Summary    15 Mar 2021 07:01  -  16 Mar 2021 07:00  --------------------------------------------------------  IN: 1520 mL / OUT: 1700 mL / NET: -180 mL    16 Mar 2021 07:01  -  16 Mar 2021 19:26  --------------------------------------------------------  IN: 940 mL / OUT: 900 mL / NET: 40 mL        PHYSICAL EXAM:  GENERAL: NAD, well-developed  HEAD:  Atraumatic, Normocephalic  EYES: EOMI, PERRLA, conjunctiva and sclera clear  NECK: Supple, No JVD  CHEST/LUNG: Clear to auscultation bilaterally; No wheeze  HEART: Regular rate and rhythm; No murmurs, rubs, or gallops  ABDOMEN: Soft, Nontender, Nondistended; Bowel sounds present  EXTREMITIES:  2+ Peripheral Pulses, No clubbing, cyanosis, or edema  PSYCH: AAOx3  NEUROLOGY: non-focal  SKIN: No rashes or lesions    LABS:                        11.9   4.98  )-----------( 203      ( 16 Mar 2021 05:39 )             37.2     03-16    143  |  104  |  12  ----------------------------<  84  3.8   |  27  |  0.99    Ca    9.0      16 Mar 2021 05:41  Phos  2.7     03-15  Mg     1.9     03-16    TPro  6.2  /  Alb  2.7<L>  /  TBili  0.4  /  DBili  x   /  AST  21  /  ALT  27  /  AlkPhos  44  03-16              RADIOLOGY & ADDITIONAL TESTS:    Imaging Personally Reviewed:    Consultant(s) Notes Reviewed:      Care Discussed with Consultants/Other Providers:

## 2021-03-16 NOTE — PROGRESS NOTE ADULT - ASSESSMENT
ACute hypoxemic respiratory failure  Obesity  Multilobar viral pneumonia due to COVID 19  Atrial Fibrillation: now back in NSR  Refractory hypoxemia: multifactorial - residual COVID, obesity with basilar atelectasis    REC    Nocturnal CPAP 8 re-ordered  Ventimask ordered - target sat 88%  No objection to Metoprolol: calcium channel blockers may ihibit HPV and worsen PO2

## 2021-03-16 NOTE — PROGRESS NOTE ADULT - SUBJECTIVE AND OBJECTIVE BOX
Infectious Diseases progress note:    Subjective: NAD, on 50% VM.  Repeat covid pcr sent    ROS:  CONSTITUTIONAL:  No fever, chills, rigors  CARDIOVASCULAR:  No chest pain or palpitations  RESPIRATORY:   No SOB, cough, dyspnea on exertion.  No wheezing  GASTROINTESTINAL:  No abd pain, N/V, diarrhea/constipation  EXTREMITIES:  No swelling or joint pain  GENITOURINARY:  No burning on urination, increased frequency or urgency.  No flank pain  NEUROLOGIC:  No HA, visual disturbances  SKIN: No rashes    Allergies    Actifed (Headache)  Sudafed (Headache)    Intolerances        ANTIBIOTICS/RELEVANT:  antimicrobials    immunologic:    OTHER:  acetaminophen   Tablet .. 650 milliGRAM(s) Oral every 6 hours PRN  benzonatate 100 milliGRAM(s) Oral every 8 hours  enoxaparin Injectable 140 milliGRAM(s) SubCutaneous every 12 hours  guaifenesin/dextromethorphan  Syrup 10 milliLiter(s) Oral every 6 hours  metoprolol tartrate 25 milliGRAM(s) Oral every 6 hours  sodium chloride 0.65% Nasal 1 Spray(s) Both Nostrils once      Objective:  Vital Signs Last 24 Hrs  T(C): 36.5 (16 Mar 2021 21:34), Max: 37 (16 Mar 2021 04:52)  T(F): 97.7 (16 Mar 2021 21:34), Max: 98.6 (16 Mar 2021 04:52)  HR: 82 (16 Mar 2021 21:34) (82 - 115)  BP: 107/70 (16 Mar 2021 21:34) (107/70 - 127/80)  BP(mean): --  RR: 20 (16 Mar 2021 21:34) (18 - 20)  SpO2: 93% (16 Mar 2021 21:34) (92% - 97%)    PHYSICAL EXAM:  Constitutional:NAD  Eyes:KRISTEN, EOMI  Ear/Nose/Throat: no thrush, mucositis.  Moist mucous membranes	  Neck:no JVD, no lymphadenopathy, supple  Respiratory: CTA berna  Cardiovascular: S1S2 RRR, no murmurs  Gastrointestinal:soft, nontender,  nondistended (+) BS  Extremities:no e/e/c  Skin:  no rashes, open wounds or ulcerations        LABS:                        11.9   4.98  )-----------( 203      ( 16 Mar 2021 05:39 )             37.2     03-16    143  |  104  |  12  ----------------------------<  84  3.8   |  27  |  0.99    Ca    9.0      16 Mar 2021 05:41  Phos  2.7     03-15  Mg     1.9     03-16    TPro  6.2  /  Alb  2.7<L>  /  TBili  0.4  /  DBili  x   /  AST  21  /  ALT  27  /  AlkPhos  44  03-16          Procalcitonin, Serum: 0.58 (02-27 @ 13:25)                    MICROBIOLOGY:          RADIOLOGY & ADDITIONAL STUDIES:

## 2021-03-16 NOTE — PROGRESS NOTE ADULT - ASSESSMENT
The patient is a 65y Male, hx of morbid obesity, HTN on lisinopril, complaining of shortness of breath. covid + dx 5d prior. no home O2 requirement, former smoker. has dyspnea at rest and on exertion. has not taken steroids, no hospitalization for covid. denies CP. [facility] (27 Feb 2021 22:29)    ER vital:  T 98.4, P 178, /85.  Pt satting 85% on RA, placed on nasal cannula, advanced to NRB.  Covid pcr (+), covid serologies (-).   CT angio chest (-) for PE, (+) b/l patchy opacities.  Pt with elevated AST (>5x ULN but <10x) and ALT.   Pt started on remdesivir and dexamethasone.     Acute covid illness:    - Cont remdesivir and dexamethasone.  Monitor daily LFTs and renal function while pt on remdesivir.  LFTs elevated, possibly secondary to viral infection.  Currently ALT < 10x ULN, can cont remdesivir.  LFTs slowly improving.     - Monitor pulse ox, pt on supplemental O2 - now on Bipap    - Monitor inflammatory markers q72    DD:  192 <-- 272 <-- 359 <-- 248 <-- 213 <-- 282 <-- 343  ferritin: 666 <-- 692 <-- 1468 <-- 1636  pct:  0.58  crp: 3.0 <-- 2.2 <-- 10.3 <-- 10.9    - Pt on full dose lovenox for dvt/pe.  CT angio chest (-) for PE.   LE dopplers neg DVT    *  Repeat cxr 3/8 with continued b/l patchy opacities with slight worsening.  No indication for abx at this time.  Pt on VM mask.  Repeat covid swab sent 3/16    [facility] ([facility]  [phone]

## 2021-03-16 NOTE — PROGRESS NOTE ADULT - SUBJECTIVE AND OBJECTIVE BOX
Follow-up Pulm Progress Note  Francisco Ramon MD  [phone]    Sleeping supine on NRB: sats 95%  No wheezing today on exam  TTE 3/11 unremarkable      Vital Signs Last 24 Hrs  T(C): 36.8 (15 Mar 2021 11:13), Max: 36.9 (15 Mar 2021 04:54)  T(F): 98.3 (15 Mar 2021 11:13), Max: 98.5 (15 Mar 2021 04:54)  HR: 99 (15 Mar 2021 13:02) (80 - 130)  BP: 107/76 (15 Mar 2021 13:02) (98/62 - 126/70)  BP(mean): --  RR: 19 (15 Mar 2021 11:13) (18 - 19)  SpO2: 96% (15 Mar 2021 11:13) (90% - 96%)                          12.9   6.05  )-----------( 229      ( 15 Mar 2021 07:24 )             41.1       03-15    141  |  103  |  12  ----------------------------<  72  4.4   |  24  |  0.99    Ca    9.0      15 Mar 2021 07:24  Phos  2.7     03-15  Mg     2.1     03-15    TPro  6.6  /  Alb  2.7<L>  /  TBili  0.4  /  DBili  x   /  AST  29  /  ALT  29  /  AlkPhos  47  03-15      CULTURES:  Culture Results:   No growth to date. (02-27 @ 18:02)  Culture Results:   No growth to date. (02-27 @ 18:02)    Most recent blood culture -- 02-27 @ 18:02   -- -- .Blood Blood 02-27 @ 18:02      Physical Examination:  PULM: No wheeze  CVS: Regular rate and rhythm, no murmurs, rubs, or gallops  ABD: Soft, non-tender  EXT:  No clubbing, cyanosis, or edema    RADIOLOGY REVIEWED  CXR:    CT chest:    PROCEDURE DATE:  02/27/2021        INTERPRETATION:  CLINICAL INFORMATION: Hypoxia, Covid 19 infection, assess for pulmonary embolism    COMPARISON: Chest radiograph from same day    PROCEDURE:  CT Angiography of the Chest.  90 ml of Omnipaque 350 was injected intravenously. 10 ml were discarded.  Sagittal and coronal reformats were performed as well as 3D (MIP) reconstructions.    FINDINGS:    LUNGS AND AIRWAYS: Patent central airways.  Bilateral patchy groundglass and consolidative opacities with peripheral and lower lobe predominance.  PLEURA: No pleural effusion.  MEDIASTINUM AND KASIE: No lymphadenopathy.  VESSELS: No central, main, lobar, or segmental pulmonary embolism. Evaluation of the subsegmental pulmonary arteries is limited/nondiagnostic secondary to respiratory motion.  HEART: Heart size is normal. No pericardial effusion.  CHEST WALL AND LOWER NECK: Prominent right upper paratracheal lymph node may be reactive  VISUALIZED UPPER ABDOMEN: Small hiatal hernia.  BONES: Spinal degenerative changes.    IMPRESSION:  No central, main, lobar, or segmental pulmonary embolism. Evaluation of the subsegmental pulmonary arteries is limited/nondiagnostic secondary to respiratory motion.    Bilateral patchy groundglass and consolidative opacities with peripheral and lower lobe predominance, compatible with patient's known Covid 19 infection. Follow-up to resolution recommended.    TTE:

## 2021-03-16 NOTE — PROGRESS NOTE ADULT - SUBJECTIVE AND OBJECTIVE BOX
Subjective: Patient seen and examined. No new events except as noted.   remains on NRB     REVIEW OF SYSTEMS:    CONSTITUTIONAL:+ weakness, fevers or chills  EYES/ENT: No visual changes;  No vertigo or throat pain   NECK: No pain or stiffness  RESPIRATORY: No cough, wheezing, hemoptysis; + shortness of breath  CARDIOVASCULAR: No chest pain or palpitations  GASTROINTESTINAL: No abdominal or epigastric pain. No nausea, vomiting, or hematemesis; No diarrhea or constipation. No melena or hematochezia.  GENITOURINARY: No dysuria, frequency or hematuria  NEUROLOGICAL: No numbness or weakness  SKIN: No itching, burning, rashes, or lesions   All other review of systems is negative unless indicated above.    MEDICATIONS:  MEDICATIONS  (STANDING):  benzonatate 100 milliGRAM(s) Oral every 8 hours  diltiazem    Tablet 60 milliGRAM(s) Oral three times a day  enoxaparin Injectable 140 milliGRAM(s) SubCutaneous every 12 hours  guaifenesin/dextromethorphan  Syrup 10 milliLiter(s) Oral every 6 hours  sodium chloride 0.65% Nasal 1 Spray(s) Both Nostrils once      PHYSICAL EXAM:  T(C): 37 (03-16-21 @ 04:52), Max: 37 (03-16-21 @ 04:52)  HR: 109 (03-16-21 @ 04:52) (93 - 109)  BP: 127/80 (03-16-21 @ 04:52) (103/68 - 127/80)  RR: 19 (03-16-21 @ 04:52) (19 - 19)  SpO2: 92% (03-16-21 @ 04:52) (92% - 96%)  Wt(kg): --  I&O's Summary    15 Mar 2021 07:01  -  16 Mar 2021 07:00  --------------------------------------------------------  IN: 1520 mL / OUT: 1700 mL / NET: -180 mL    16 Mar 2021 07:01  -  16 Mar 2021 11:33  --------------------------------------------------------  IN: 520 mL / OUT: 400 mL / NET: 120 mL              Appearance: NAD NRB  	  HEENT:   Normal oral mucosa, PERRL, EOMI	  Lymphatic: No lymphadenopathy , no edema  Cardiovascular: Normal S1 S2, No JVD, No murmurs , Peripheral pulses palpable 2+ bilaterally  Respiratory: Lungs clear to auscultation, normal effort 	  Gastrointestinal:  Soft, Non-tender, + BS	  Skin: No rashes, No ecchymoses, No cyanosis, warm to touch  Musculoskeletal: Normal range of motion, normal strength  Psychiatry:  Mood & affect appropriate  Ext: No edema        LABS:    CARDIAC MARKERS:                                11.9   4.98  )-----------( 203      ( 16 Mar 2021 05:39 )             37.2     03-16    143  |  104  |  12  ----------------------------<  84  3.8   |  27  |  0.99    Ca    9.0      16 Mar 2021 05:41  Phos  2.7     03-15  Mg     1.9     03-16    TPro  6.2  /  Alb  2.7<L>  /  TBili  0.4  /  DBili  x   /  AST  21  /  ALT  27  /  AlkPhos  44  03-16    proBNP:   Lipid Profile:   HgA1c:   TSH:             TELEMETRY: 	 AF   ECG:  	  RADIOLOGY:   DIAGNOSTIC TESTING:  [ ] Echocardiogram:  [ ]  Catheterization:  [ ] Stress Test:    OTHER:

## 2021-03-16 NOTE — CHART NOTE - NSCHARTNOTEFT_GEN_A_CORE
Nutrition Follow-up Note  Patient seen for: nutrition follow-up    Source of Information: Pt, EMR. Pt remains on non-rebreather.    Current Diet:  DASH/TLC + Mighty Shake TID + Protein apple juice BID pt also receives food from home.    Subjective Information: Pt reports appetite is improving, eats ~2 meals daily and receives food from home. Flow sheets show % PO intake in recent days which is an improvement from beginning of admission. Reviewed menu ordering procedures with pt.    Objective Information:  - Chart reviewed, events noted.  - GI: no noted N+V, last BM 3/15 x2  - Skin per nursing flow sheets: no noted pressure injuries  - Edema: 1+ generalized  - Weights: Dosing wt 309 pounds (2/27). Pt suspects wt loss since covid-19 illness, although is unsure how much. Pt was in chair at time of visitation, will obtain new weight as able.    03-16 Na143 mmol/L Glu 84 mg/dL K+ 3.8 mmol/L Cr  0.99 mg/dL BUN 12 mg/dL Phos n/a   Alb 2.7 g/dL<L> PAB n/a       Previous Nutrition Diagnosis:   1. inadequate protein energy intake - improving, nutrition supplements offered daily, food brought in from home  2. overweight/obesity    New Nutrition Diagnosis: none    Nutrition Care Plan:  [x ] In progress   [ ] Achieved    Nutrition Interventions:  1) Continue current diet, pt declined to give food preferences at this time.  2) Monitor PO intake  3) Obtain new bed weight.  4) Pt to receive pre-DM diet education when appropriate.    Monitoring and Evaluation:   Continue to monitor Nutritional intake, Tolerance to diet prescription, weights, labs, skin integrity    RD remains available upon request and will follow up per protocol  Dona Escobar RDN, CDN #[phone]

## 2021-03-17 LAB
ALBUMIN SERPL ELPH-MCNC: 2.7 G/DL — LOW (ref 3.3–5)
ALP SERPL-CCNC: 45 U/L — SIGNIFICANT CHANGE UP (ref 40–120)
ALT FLD-CCNC: 25 U/L — SIGNIFICANT CHANGE UP (ref 10–45)
ANION GAP SERPL CALC-SCNC: 11 MMOL/L — SIGNIFICANT CHANGE UP (ref 5–17)
AST SERPL-CCNC: 22 U/L — SIGNIFICANT CHANGE UP (ref 10–40)
BILIRUB DIRECT SERPL-MCNC: <0.1 MG/DL — SIGNIFICANT CHANGE UP (ref 0–0.2)
BILIRUB INDIRECT FLD-MCNC: >0.2 MG/DL — SIGNIFICANT CHANGE UP (ref 0.2–1)
BILIRUB SERPL-MCNC: 0.3 MG/DL — SIGNIFICANT CHANGE UP (ref 0.2–1.2)
BUN SERPL-MCNC: 9 MG/DL — SIGNIFICANT CHANGE UP (ref 7–23)
CALCIUM SERPL-MCNC: 9.1 MG/DL — SIGNIFICANT CHANGE UP (ref 8.4–10.5)
CHLORIDE SERPL-SCNC: 104 MMOL/L — SIGNIFICANT CHANGE UP (ref 96–108)
CO2 SERPL-SCNC: 26 MMOL/L — SIGNIFICANT CHANGE UP (ref 22–31)
CREAT SERPL-MCNC: 0.97 MG/DL — SIGNIFICANT CHANGE UP (ref 0.5–1.3)
CRP SERPL-MCNC: 2.4 MG/DL — HIGH (ref 0–0.4)
D DIMER BLD IA.RAPID-MCNC: 188 NG/ML DDU — SIGNIFICANT CHANGE UP
FERRITIN SERPL-MCNC: 545 NG/ML — HIGH (ref 30–400)
GLUCOSE SERPL-MCNC: 83 MG/DL — SIGNIFICANT CHANGE UP (ref 70–99)
HCT VFR BLD CALC: 38.5 % — LOW (ref 39–50)
HGB BLD-MCNC: 12.4 G/DL — LOW (ref 13–17)
LDH SERPL L TO P-CCNC: 280 U/L — HIGH (ref 50–242)
MAGNESIUM SERPL-MCNC: 1.9 MG/DL — SIGNIFICANT CHANGE UP (ref 1.6–2.6)
MCHC RBC-ENTMCNC: 29.5 PG — SIGNIFICANT CHANGE UP (ref 27–34)
MCHC RBC-ENTMCNC: 32.2 GM/DL — SIGNIFICANT CHANGE UP (ref 32–36)
MCV RBC AUTO: 91.7 FL — SIGNIFICANT CHANGE UP (ref 80–100)
NRBC # BLD: 0 /100 WBCS — SIGNIFICANT CHANGE UP (ref 0–0)
PHOSPHATE SERPL-MCNC: 3.1 MG/DL — SIGNIFICANT CHANGE UP (ref 2.5–4.5)
PLATELET # BLD AUTO: 200 K/UL — SIGNIFICANT CHANGE UP (ref 150–400)
POTASSIUM SERPL-MCNC: 3.6 MMOL/L — SIGNIFICANT CHANGE UP (ref 3.5–5.3)
POTASSIUM SERPL-SCNC: 3.6 MMOL/L — SIGNIFICANT CHANGE UP (ref 3.5–5.3)
PROT SERPL-MCNC: 6.2 G/DL — SIGNIFICANT CHANGE UP (ref 6–8.3)
RBC # BLD: 4.2 M/UL — SIGNIFICANT CHANGE UP (ref 4.2–5.8)
RBC # FLD: 13.3 % — SIGNIFICANT CHANGE UP (ref 10.3–14.5)
SARS-COV-2 RNA SPEC QL NAA+PROBE: DETECTED
SODIUM SERPL-SCNC: 141 MMOL/L — SIGNIFICANT CHANGE UP (ref 135–145)
WBC # BLD: 3.68 K/UL — LOW (ref 3.8–10.5)
WBC # FLD AUTO: 3.68 K/UL — LOW (ref 3.8–10.5)

## 2021-03-17 RX ORDER — METOPROLOL TARTRATE 50 MG
50 TABLET ORAL EVERY 6 HOURS
Refills: 0 | Status: DISCONTINUED | OUTPATIENT
Start: 2021-03-17 | End: 2021-03-26

## 2021-03-17 RX ADMIN — ENOXAPARIN SODIUM 140 MILLIGRAM(S): 100 INJECTION SUBCUTANEOUS at 17:04

## 2021-03-17 RX ADMIN — ENOXAPARIN SODIUM 140 MILLIGRAM(S): 100 INJECTION SUBCUTANEOUS at 05:46

## 2021-03-17 RX ADMIN — Medication 100 MILLIGRAM(S): at 23:07

## 2021-03-17 RX ADMIN — Medication 100 MILLIGRAM(S): at 13:44

## 2021-03-17 RX ADMIN — Medication 100 MILLIGRAM(S): at 05:46

## 2021-03-17 RX ADMIN — Medication 10 MILLILITER(S): at 05:53

## 2021-03-17 RX ADMIN — Medication 10 MILLILITER(S): at 12:48

## 2021-03-17 RX ADMIN — Medication 50 MILLIGRAM(S): at 12:44

## 2021-03-17 RX ADMIN — Medication 25 MILLIGRAM(S): at 05:46

## 2021-03-17 RX ADMIN — Medication 10 MILLILITER(S): at 17:03

## 2021-03-17 RX ADMIN — Medication 50 MILLIGRAM(S): at 18:36

## 2021-03-17 RX ADMIN — Medication 10 MILLILITER(S): at 23:08

## 2021-03-17 NOTE — PROGRESS NOTE ADULT - SUBJECTIVE AND OBJECTIVE BOX
Infectious Diseases progress note:    Subjective: NAD, on VM.  Used bipap last night.  States he is feeling fine, doesn't like the food much.      ROS:  CONSTITUTIONAL:  No fever, chills, rigors  CARDIOVASCULAR:  No chest pain or palpitations  RESPIRATORY:   No SOB, cough, dyspnea on exertion.  No wheezing  GASTROINTESTINAL:  No abd pain, N/V, diarrhea/constipation  EXTREMITIES:  No swelling or joint pain  GENITOURINARY:  No burning on urination, increased frequency or urgency.  No flank pain  NEUROLOGIC:  No HA, visual disturbances  SKIN: No rashes    Allergies    Actifed (Headache)  Sudafed (Headache)    Intolerances        ANTIBIOTICS/RELEVANT:  antimicrobials    immunologic:    OTHER:  acetaminophen   Tablet .. 650 milliGRAM(s) Oral every 6 hours PRN  benzonatate 100 milliGRAM(s) Oral every 8 hours  enoxaparin Injectable 140 milliGRAM(s) SubCutaneous every 12 hours  guaifenesin/dextromethorphan  Syrup 10 milliLiter(s) Oral every 6 hours  metoprolol tartrate 50 milliGRAM(s) Oral every 6 hours  sodium chloride 0.65% Nasal 1 Spray(s) Both Nostrils once      Objective:  Vital Signs Last 24 Hrs  T(C): 37.3 (17 Mar 2021 11:35), Max: 37.3 (17 Mar 2021 11:35)  T(F): 99.1 (17 Mar 2021 11:35), Max: 99.1 (17 Mar 2021 11:35)  HR: 75 (17 Mar 2021 15:33) (75 - 124)  BP: 121/79 (17 Mar 2021 11:35) (107/70 - 121/79)  BP(mean): --  RR: 19 (17 Mar 2021 11:35) (19 - 20)  SpO2: 95% (17 Mar 2021 15:33) (92% - 97%)    PHYSICAL EXAM:  Constitutional:NAD, on VM  Eyes:KRISTEN, EOMI  Ear/Nose/Throat: no thrush, mucositis.  Moist mucous membranes	  Neck:no JVD, no lymphadenopathy, supple  Respiratory: CTA berna  Cardiovascular: S1S2 RRR, no murmurs  Gastrointestinal:soft, nontender,  nondistended (+) BS  Extremities:no e/e/c  Skin:  no rashes, open wounds or ulcerations        LABS:                        12.4   3.68  )-----------( 200      ( 17 Mar 2021 05:51 )             38.5     03-17    141  |  104  |  9   ----------------------------<  83  3.6   |  26  |  0.97    Ca    9.1      17 Mar 2021 05:51  Phos  3.1     03-17  Mg     1.9     03-17    TPro  6.2  /  Alb  2.7<L>  /  TBili  0.3  /  DBili  <0.1  /  AST  22  /  ALT  25  /  AlkPhos  45  03-17          Procalcitonin, Serum: 0.58 (02-27 @ 13:25)                    MICROBIOLOGY:          RADIOLOGY & ADDITIONAL STUDIES:    < from: Xray Chest 1 View- PORTABLE-Routine (Xray Chest 1 View- PORTABLE-Routine .) (03.08.21 @ 15:49) >  Impression:    The heart is slightly enlarged. Airway opacities are seen in both lungs compatible with multifocal pneumonia such as seen in Covid 19. This appears to be slightly worsened when compared to previous study done March 1, 2021 at 5:49 PM.    < end of copied text >

## 2021-03-17 NOTE — PROGRESS NOTE ADULT - ASSESSMENT
ACute hypoxemic respiratory failure  Obesity  Multilobar viral pneumonia due to COVID 19  Atrial Fibrillation: now back in NSR  Refractory hypoxemia: multifactorial - residual COVID, obesity with basilar atelectasis    REC    Continue nocturnal CPAP 8 and prn day if sleeping  Continue ventimask 50%: transition to nasal cannula 3/18  Target sats 88-90% and no higher

## 2021-03-17 NOTE — PROGRESS NOTE ADULT - ASSESSMENT
The patient is a 65y Male, hx of morbid obesity, HTN on lisinopril, complaining of shortness of breath. covid + dx 5d prior. no home O2 requirement, former smoker. has dyspnea at rest and on exertion. has not taken steroids, no hospitalization for covid. denies CP. [facility] (27 Feb 2021 22:29)    ER vital:  T 98.4, P 178, /85.  Pt satting 85% on RA, placed on nasal cannula, advanced to NRB.  Covid pcr (+), covid serologies (-).   CT angio chest (-) for PE, (+) b/l patchy opacities.  Pt with elevated AST (>5x ULN but <10x) and ALT.   Pt started on remdesivir and dexamethasone.     Acute covid illness:    - Cont remdesivir and dexamethasone.  Monitor daily LFTs and renal function while pt on remdesivir.  LFTs elevated, possibly secondary to viral infection.  Currently ALT < 10x ULN, can cont remdesivir.  LFTs slowly improving.     - Monitor pulse ox, pt on supplemental O2 - now on Bipap    - Monitor inflammatory markers q72    DD:  192 <-- 272 <-- 359 <-- 248 <-- 213 <-- 282 <-- 343  ferritin: 666 <-- 692 <-- 1468 <-- 1636  pct:  0.58  crp: 3.0 <-- 2.2 <-- 10.3 <-- 10.9    - Pt on full dose lovenox for dvt/pe.  CT angio chest (-) for PE.   LE dopplers neg DVT    *  Repeat cxr 3/8 with continued b/l patchy opacities with slight worsening.  No indication for abx at this time.  Pt on VM mask.  Repeat covid swab sent 3/16 positive.  pt remains on isolation.     [facility]  [phone]

## 2021-03-17 NOTE — PROGRESS NOTE ADULT - SUBJECTIVE AND OBJECTIVE BOX
Patient is a 65y old  Male who presents with a chief complaint of acute hypoxemic respiratory failure (17 Mar 2021 16:51)      SUBJECTIVE / OVERNIGHT EVENTS: inflammatory markers improving, tolerating 50% Venti mask, tolerating nocturnal CPAP    MEDICATIONS  (STANDING):  benzonatate 100 milliGRAM(s) Oral every 8 hours  enoxaparin Injectable 140 milliGRAM(s) SubCutaneous every 12 hours  guaifenesin/dextromethorphan  Syrup 10 milliLiter(s) Oral every 6 hours  metoprolol tartrate 50 milliGRAM(s) Oral every 6 hours  sodium chloride 0.65% Nasal 1 Spray(s) Both Nostrils once    MEDICATIONS  (PRN):  acetaminophen   Tablet .. 650 milliGRAM(s) Oral every 6 hours PRN Mild Pain (1 - 3)      Vital Signs Last 24 Hrs  T(F): 97.6 (03-17-21 @ 21:00), Max: 99.1 (03-17-21 @ 11:35)  HR: 71 (03-17-21 @ 21:00) (71 - 124)  BP: 120/86 (03-17-21 @ 21:00) (112/83 - 121/79)  RR: 18 (03-17-21 @ 21:00) (18 - 20)  SpO2: 95% (03-17-21 @ 21:00) (92% - 97%)  Telemetry:   CAPILLARY BLOOD GLUCOSE        I&O's Summary    16 Mar 2021 07:01  -  17 Mar 2021 07:00  --------------------------------------------------------  IN: 2340 mL / OUT: 1851 mL / NET: 489 mL    17 Mar 2021 07:01  -  17 Mar 2021 23:01  --------------------------------------------------------  IN: 840 mL / OUT: 250 mL / NET: 590 mL        PHYSICAL EXAM:  GENERAL: NAD, well-developed  HEAD:  Atraumatic, Normocephalic  EYES: EOMI, PERRLA, conjunctiva and sclera clear  NECK: Supple, No JVD  CHEST/LUNG: Clear to auscultation bilaterally; No wheeze  HEART: Regular rate and rhythm; No murmurs, rubs, or gallops  ABDOMEN: Soft, Nontender, Nondistended; Bowel sounds present  EXTREMITIES:  2+ Peripheral Pulses, No clubbing, cyanosis, or edema  PSYCH: AAOx3  NEUROLOGY: non-focal  SKIN: No rashes or lesions    LABS:                        12.4   3.68  )-----------( 200      ( 17 Mar 2021 05:51 )             38.5     03-17    141  |  104  |  9   ----------------------------<  83  3.6   |  26  |  0.97    Ca    9.1      17 Mar 2021 05:51  Phos  3.1     03-17  Mg     1.9     03-17    TPro  6.2  /  Alb  2.7<L>  /  TBili  0.3  /  DBili  <0.1  /  AST  22  /  ALT  25  /  AlkPhos  45  03-17              RADIOLOGY & ADDITIONAL TESTS:    Imaging Personally Reviewed:    Consultant(s) Notes Reviewed:      Care Discussed with Consultants/Other Providers:

## 2021-03-17 NOTE — PROGRESS NOTE ADULT - SUBJECTIVE AND OBJECTIVE BOX
Subjective: Patient seen and examined. No new events except as noted.   AF RVR   REVIEW OF SYSTEMS:    CONSTITUTIONAL: + weakness, fevers or chills  EYES/ENT: No visual changes;  No vertigo or throat pain   NECK: No pain or stiffness  RESPIRATORY: No cough, wheezing, hemoptysis; No shortness of breath  CARDIOVASCULAR: No chest pain or palpitations  GASTROINTESTINAL: No abdominal or epigastric pain. No nausea, vomiting, or hematemesis; No diarrhea or constipation. No melena or hematochezia.  GENITOURINARY: No dysuria, frequency or hematuria  NEUROLOGICAL: No numbness or weakness  SKIN: No itching, burning, rashes, or lesions   All other review of systems is negative unless indicated above.    MEDICATIONS:  MEDICATIONS  (STANDING):  benzonatate 100 milliGRAM(s) Oral every 8 hours  enoxaparin Injectable 140 milliGRAM(s) SubCutaneous every 12 hours  guaifenesin/dextromethorphan  Syrup 10 milliLiter(s) Oral every 6 hours  metoprolol tartrate 25 milliGRAM(s) Oral every 6 hours  sodium chloride 0.65% Nasal 1 Spray(s) Both Nostrils once      PHYSICAL EXAM:  T(C): 37.3 (03-17-21 @ 11:35), Max: 37.3 (03-17-21 @ 11:35)  HR: 89 (03-17-21 @ 11:35) (82 - 124)  BP: 121/79 (03-17-21 @ 11:35) (107/70 - 121/79)  RR: 19 (03-17-21 @ 11:35) (19 - 20)  SpO2: 92% (03-17-21 @ 11:35) (92% - 97%)  Wt(kg): --  I&O's Summary    16 Mar 2021 07:01  -  17 Mar 2021 07:00  --------------------------------------------------------  IN: 2340 mL / OUT: 1851 mL / NET: 489 mL          Appearance: NAD  HEENT:  Dry  oral mucosa, PERRL, EOMI	  Lymphatic: No lymphadenopathy , no edema  Cardiovascular: Normal S1 S2, No JVD, No murmurs , Peripheral pulses palpable 2+ bilaterally  Respiratory: Decreased bs   Gastrointestinal:  Soft, Non-tender, + BS	  Skin: No rashes, No ecchymoses, No cyanosis, warm to touch  Musculoskeletal: Normal range of motion, normal strength  Psychiatry:  Mood & affect appropriate  Ext: No edema      LABS:    CARDIAC MARKERS:                                12.4   3.68  )-----------( 200      ( 17 Mar 2021 05:51 )             38.5     03-17    141  |  104  |  9   ----------------------------<  83  3.6   |  26  |  0.97    Ca    9.1      17 Mar 2021 05:51  Phos  3.1     03-17  Mg     1.9     03-17    TPro  6.2  /  Alb  2.7<L>  /  TBili  0.3  /  DBili  <0.1  /  AST  22  /  ALT  25  /  AlkPhos  45  03-17    proBNP:   Lipid Profile:   HgA1c:   TSH:             TELEMETRY: 	  AF  ECG:  	  RADIOLOGY:   DIAGNOSTIC TESTING:  [ ] Echocardiogram:  [ ]  Catheterization:  [ ] Stress Test:    OTHER:

## 2021-03-17 NOTE — PROGRESS NOTE ADULT - SUBJECTIVE AND OBJECTIVE BOX
Follow-up Pulm Progress Note  Francisco Ramon MD  [phone]    Tolerating nasal CPAP  Patient NOT on NRB! - has been on 50% ventimask with O2 sats in upper 90's: tolerating PT/ambulation with venti-mask      Vital Signs Last 24 Hrs  T(C): 37.3 (17 Mar 2021 11:35), Max: 37.3 (17 Mar 2021 11:35)  T(F): 99.1 (17 Mar 2021 11:35), Max: 99.1 (17 Mar 2021 11:35)  HR: 98 (17 Mar 2021 12:28) (82 - 124)  BP: 121/79 (17 Mar 2021 11:35) (107/70 - 121/79)  BP(mean): --  RR: 19 (17 Mar 2021 11:35) (19 - 20)  SpO2: 95% (17 Mar 2021 12:28) (92% - 97%)                        12.4   3.68  )-----------( 200      ( 17 Mar 2021 05:51 )             38.5     03-17    141  |  104  |  9   ----------------------------<  83  3.6   |  26  |  0.97    Ca    9.1      17 Mar 2021 05:51  Phos  3.1     03-17  Mg     1.9     03-17    TPro  6.2  /  Alb  2.7<L>  /  TBili  0.3  /  DBili  <0.1  /  AST  22  /  ALT  25  /  AlkPhos  45  03-17    CULTURES:  Culture Results:   No growth to date. (02-27 @ 18:02)  Culture Results:   No growth to date. (02-27 @ 18:02)    Most recent blood culture -- 02-27 @ 18:02   -- -- .Blood Blood 02-27 @ 18:02      Physical Examination:  PULM: No wheeze  CVS: Regular rate and rhythm, no murmurs, rubs, or gallops  ABD: Soft, non-tender  EXT:  No clubbing, cyanosis, or edema    RADIOLOGY REVIEWED  CXR:    CT chest:    PROCEDURE DATE:  02/27/2021        INTERPRETATION:  CLINICAL INFORMATION: Hypoxia, Covid 19 infection, assess for pulmonary embolism    COMPARISON: Chest radiograph from same day    PROCEDURE:  CT Angiography of the Chest.  90 ml of Omnipaque 350 was injected intravenously. 10 ml were discarded.  Sagittal and coronal reformats were performed as well as 3D (MIP) reconstructions.    FINDINGS:    LUNGS AND AIRWAYS: Patent central airways.  Bilateral patchy groundglass and consolidative opacities with peripheral and lower lobe predominance.  PLEURA: No pleural effusion.  MEDIASTINUM AND KASIE: No lymphadenopathy.  VESSELS: No central, main, lobar, or segmental pulmonary embolism. Evaluation of the subsegmental pulmonary arteries is limited/nondiagnostic secondary to respiratory motion.  HEART: Heart size is normal. No pericardial effusion.  CHEST WALL AND LOWER NECK: Prominent right upper paratracheal lymph node may be reactive  VISUALIZED UPPER ABDOMEN: Small hiatal hernia.  BONES: Spinal degenerative changes.    IMPRESSION:  No central, main, lobar, or segmental pulmonary embolism. Evaluation of the subsegmental pulmonary arteries is limited/nondiagnostic secondary to respiratory motion.    Bilateral patchy groundglass and consolidative opacities with peripheral and lower lobe predominance, compatible with patient's known Covid 19 infection. Follow-up to resolution recommended.    TTE:

## 2021-03-18 LAB
ALBUMIN SERPL ELPH-MCNC: 2.7 G/DL — LOW (ref 3.3–5)
ALP SERPL-CCNC: 44 U/L — SIGNIFICANT CHANGE UP (ref 40–120)
ALT FLD-CCNC: 25 U/L — SIGNIFICANT CHANGE UP (ref 10–45)
ANION GAP SERPL CALC-SCNC: 8 MMOL/L — SIGNIFICANT CHANGE UP (ref 5–17)
AST SERPL-CCNC: 21 U/L — SIGNIFICANT CHANGE UP (ref 10–40)
BILIRUB SERPL-MCNC: 0.3 MG/DL — SIGNIFICANT CHANGE UP (ref 0.2–1.2)
BUN SERPL-MCNC: 8 MG/DL — SIGNIFICANT CHANGE UP (ref 7–23)
CALCIUM SERPL-MCNC: 9.2 MG/DL — SIGNIFICANT CHANGE UP (ref 8.4–10.5)
CHLORIDE SERPL-SCNC: 103 MMOL/L — SIGNIFICANT CHANGE UP (ref 96–108)
CO2 SERPL-SCNC: 29 MMOL/L — SIGNIFICANT CHANGE UP (ref 22–31)
CREAT SERPL-MCNC: 1.03 MG/DL — SIGNIFICANT CHANGE UP (ref 0.5–1.3)
GLUCOSE SERPL-MCNC: 93 MG/DL — SIGNIFICANT CHANGE UP (ref 70–99)
HCT VFR BLD CALC: 34.8 % — LOW (ref 39–50)
HGB BLD-MCNC: 11.2 G/DL — LOW (ref 13–17)
MCHC RBC-ENTMCNC: 29.7 PG — SIGNIFICANT CHANGE UP (ref 27–34)
MCHC RBC-ENTMCNC: 32.2 GM/DL — SIGNIFICANT CHANGE UP (ref 32–36)
MCV RBC AUTO: 92.3 FL — SIGNIFICANT CHANGE UP (ref 80–100)
NRBC # BLD: 0 /100 WBCS — SIGNIFICANT CHANGE UP (ref 0–0)
PLATELET # BLD AUTO: 190 K/UL — SIGNIFICANT CHANGE UP (ref 150–400)
POTASSIUM SERPL-MCNC: 4.3 MMOL/L — SIGNIFICANT CHANGE UP (ref 3.5–5.3)
POTASSIUM SERPL-SCNC: 4.3 MMOL/L — SIGNIFICANT CHANGE UP (ref 3.5–5.3)
PROT SERPL-MCNC: 6.1 G/DL — SIGNIFICANT CHANGE UP (ref 6–8.3)
RBC # BLD: 3.77 M/UL — LOW (ref 4.2–5.8)
RBC # FLD: 13.2 % — SIGNIFICANT CHANGE UP (ref 10.3–14.5)
SODIUM SERPL-SCNC: 140 MMOL/L — SIGNIFICANT CHANGE UP (ref 135–145)
WBC # BLD: 4.05 K/UL — SIGNIFICANT CHANGE UP (ref 3.8–10.5)
WBC # FLD AUTO: 4.05 K/UL — SIGNIFICANT CHANGE UP (ref 3.8–10.5)

## 2021-03-18 RX ADMIN — Medication 10 MILLILITER(S): at 11:48

## 2021-03-18 RX ADMIN — Medication 10 MILLILITER(S): at 17:01

## 2021-03-18 RX ADMIN — Medication 50 MILLIGRAM(S): at 11:48

## 2021-03-18 RX ADMIN — Medication 50 MILLIGRAM(S): at 17:01

## 2021-03-18 RX ADMIN — ENOXAPARIN SODIUM 140 MILLIGRAM(S): 100 INJECTION SUBCUTANEOUS at 05:35

## 2021-03-18 RX ADMIN — Medication 100 MILLIGRAM(S): at 13:04

## 2021-03-18 RX ADMIN — Medication 100 MILLIGRAM(S): at 21:45

## 2021-03-18 RX ADMIN — Medication 10 MILLILITER(S): at 06:17

## 2021-03-18 RX ADMIN — ENOXAPARIN SODIUM 140 MILLIGRAM(S): 100 INJECTION SUBCUTANEOUS at 17:01

## 2021-03-18 RX ADMIN — Medication 50 MILLIGRAM(S): at 05:34

## 2021-03-18 RX ADMIN — Medication 100 MILLIGRAM(S): at 06:17

## 2021-03-18 NOTE — PROGRESS NOTE ADULT - ASSESSMENT
ACute hypoxemic respiratory failure  Obesity  Multilobar viral pneumonia due to COVID 19  Atrial Fibrillation: now back in NSR  Refractory hypoxemia: multifactorial - residual COVID, obesity with basilar atelectasis    REC    Continue nocturnal CPAP 8 and prn day if sleeping  Reduce ventimask FIO2 to 40% - if sats >88%, transiton to nasal cannula at 4-6 liters

## 2021-03-18 NOTE — PROGRESS NOTE ADULT - SUBJECTIVE AND OBJECTIVE BOX
Patient is a 65y old  Male who presents with a chief complaint of acute hypoxemic respiratory failure (18 Mar 2021 20:03)      SUBJECTIVE / OVERNIGHT EVENTS: sat 40% on Venti mask    MEDICATIONS  (STANDING):  benzonatate 100 milliGRAM(s) Oral every 8 hours  enoxaparin Injectable 140 milliGRAM(s) SubCutaneous every 12 hours  guaifenesin/dextromethorphan  Syrup 10 milliLiter(s) Oral every 6 hours  metoprolol tartrate 50 milliGRAM(s) Oral every 6 hours  sodium chloride 0.65% Nasal 1 Spray(s) Both Nostrils once    MEDICATIONS  (PRN):  acetaminophen   Tablet .. 650 milliGRAM(s) Oral every 6 hours PRN Mild Pain (1 - 3)      Vital Signs Last 24 Hrs  T(F): 98.3 (03-18-21 @ 21:04), Max: 98.5 (03-18-21 @ 11:19)  HR: 76 (03-18-21 @ 21:04) (74 - 84)  BP: 128/87 (03-18-21 @ 21:04) (120/82 - 138/87)  RR: 20 (03-18-21 @ 21:04) (18 - 22)  SpO2: 94% (03-18-21 @ 21:04) (90% - 98%)  Telemetry:   CAPILLARY BLOOD GLUCOSE        I&O's Summary    17 Mar 2021 07:01  -  18 Mar 2021 07:00  --------------------------------------------------------  IN: 840 mL / OUT: 875 mL / NET: -35 mL    18 Mar 2021 07:01  -  18 Mar 2021 22:19  --------------------------------------------------------  IN: 600 mL / OUT: 1350 mL / NET: -750 mL        PHYSICAL EXAM:  GENERAL: NAD, well-developed  HEAD:  Atraumatic, Normocephalic  EYES: EOMI, PERRLA, conjunctiva and sclera clear  NECK: Supple, No JVD  CHEST/LUNG: Clear to auscultation bilaterally; No wheeze  HEART: Regular rate and rhythm; No murmurs, rubs, or gallops  ABDOMEN: Soft, Nontender, Nondistended; Bowel sounds present  EXTREMITIES:  2+ Peripheral Pulses, No clubbing, cyanosis, or edema  PSYCH: AAOx3  NEUROLOGY: non-focal  SKIN: No rashes or lesions    LABS:                        11.2   4.05  )-----------( 190      ( 18 Mar 2021 06:42 )             34.8     03-18    140  |  103  |  8   ----------------------------<  93  4.3   |  29  |  1.03    Ca    9.2      18 Mar 2021 06:40  Phos  3.1     03-17  Mg     1.9     03-17    TPro  6.1  /  Alb  2.7<L>  /  TBili  0.3  /  DBili  x   /  AST  21  /  ALT  25  /  AlkPhos  44  03-18              RADIOLOGY & ADDITIONAL TESTS:    Imaging Personally Reviewed:    Consultant(s) Notes Reviewed:      Care Discussed with Consultants/Other Providers:

## 2021-03-18 NOTE — PROGRESS NOTE ADULT - SUBJECTIVE AND OBJECTIVE BOX
Follow-up Pulm Progress Note  Francisco Ramon MD  [phone]    Oxygen sats in mid to upper 90's on 50% ventimask  Using CPAP      Vital Signs Last 24 Hrs  T(C): 36.9 (18 Mar 2021 11:19), Max: 36.9 (18 Mar 2021 05:48)  T(F): 98.5 (18 Mar 2021 11:19), Max: 98.5 (18 Mar 2021 11:19)  HR: 82 (18 Mar 2021 11:19) (63 - 87)  BP: 138/87 (18 Mar 2021 11:19) (113/87 - 138/87)  BP(mean): --  RR: 21 (18 Mar 2021 11:19) (18 - 21)  SpO2: 90% (18 Mar 2021 11:19) (90% - 98%)                        11.2   4.05  )-----------( 190      ( 18 Mar 2021 06:42 )             34.8     03-18    140  |  103  |  8   ----------------------------<  93  4.3   |  29  |  1.03    Ca    9.2      18 Mar 2021 06:40  Phos  3.1     03-17  Mg     1.9     03-17    TPro  6.1  /  Alb  2.7<L>  /  TBili  0.3  /  DBili  x   /  AST  21  /  ALT  25  /  AlkPhos  44  03-18    CULTURES:  Culture Results:   No growth to date. (02-27 @ 18:02)  Culture Results:   No growth to date. (02-27 @ 18:02)    Most recent blood culture -- 02-27 @ 18:02   -- -- .Blood Blood 02-27 @ 18:02      Physical Examination:  PULM: No wheeze  CVS: Regular rate and rhythm, no murmurs, rubs, or gallops  ABD: Soft, non-tender  EXT:  No clubbing, cyanosis, or edema    RADIOLOGY REVIEWED  CXR:    CT chest:    PROCEDURE DATE:  02/27/2021        INTERPRETATION:  CLINICAL INFORMATION: Hypoxia, Covid 19 infection, assess for pulmonary embolism    COMPARISON: Chest radiograph from same day    PROCEDURE:  CT Angiography of the Chest.  90 ml of Omnipaque 350 was injected intravenously. 10 ml were discarded.  Sagittal and coronal reformats were performed as well as 3D (MIP) reconstructions.    FINDINGS:    LUNGS AND AIRWAYS: Patent central airways.  Bilateral patchy groundglass and consolidative opacities with peripheral and lower lobe predominance.  PLEURA: No pleural effusion.  MEDIASTINUM AND KASIE: No lymphadenopathy.  VESSELS: No central, main, lobar, or segmental pulmonary embolism. Evaluation of the subsegmental pulmonary arteries is limited/nondiagnostic secondary to respiratory motion.  HEART: Heart size is normal. No pericardial effusion.  CHEST WALL AND LOWER NECK: Prominent right upper paratracheal lymph node may be reactive  VISUALIZED UPPER ABDOMEN: Small hiatal hernia.  BONES: Spinal degenerative changes.    IMPRESSION:  No central, main, lobar, or segmental pulmonary embolism. Evaluation of the subsegmental pulmonary arteries is limited/nondiagnostic secondary to respiratory motion.    Bilateral patchy groundglass and consolidative opacities with peripheral and lower lobe predominance, compatible with patient's known Covid 19 infection. Follow-up to resolution recommended.    TTE:

## 2021-03-18 NOTE — PROGRESS NOTE ADULT - SUBJECTIVE AND OBJECTIVE BOX
Subjective: Patient seen and examined. No new events except as noted.   HR stable  Oxygen sats in mid to upper 90's on 50% ventimask  Using CPAP    REVIEW OF SYSTEMS:    CONSTITUTIONAL: + weakness, fevers or chills  EYES/ENT: No visual changes;  No vertigo or throat pain   NECK: No pain or stiffness  RESPIRATORY: No cough, wheezing, hemoptysis; + shortness of breath  CARDIOVASCULAR: No chest pain or palpitations  GASTROINTESTINAL: No abdominal or epigastric pain. No nausea, vomiting, or hematemesis; No diarrhea or constipation. No melena or hematochezia.  GENITOURINARY: No dysuria, frequency or hematuria  NEUROLOGICAL: No numbness or weakness  SKIN: No itching, burning, rashes, or lesions   All other review of systems is negative unless indicated above.    MEDICATIONS:  MEDICATIONS  (STANDING):  benzonatate 100 milliGRAM(s) Oral every 8 hours  enoxaparin Injectable 140 milliGRAM(s) SubCutaneous every 12 hours  guaifenesin/dextromethorphan  Syrup 10 milliLiter(s) Oral every 6 hours  metoprolol tartrate 50 milliGRAM(s) Oral every 6 hours  sodium chloride 0.65% Nasal 1 Spray(s) Both Nostrils once      PHYSICAL EXAM:  T(C): 36.4 (03-18-21 @ 16:20), Max: 36.9 (03-18-21 @ 05:48)  HR: 75 (03-18-21 @ 17:52) (63 - 84)  BP: 121/87 (03-18-21 @ 16:20) (120/82 - 138/87)  RR: 20 (03-18-21 @ 17:52) (18 - 22)  SpO2: 93% (03-18-21 @ 17:52) (90% - 98%)  Wt(kg): --  I&O's Summary    17 Mar 2021 07:01  -  18 Mar 2021 07:00  --------------------------------------------------------  IN: 840 mL / OUT: 875 mL / NET: -35 mL    18 Mar 2021 07:01  -  18 Mar 2021 20:03  --------------------------------------------------------  IN: 600 mL / OUT: 1350 mL / NET: -750 mL          Appearance: NAD  HEENT:  Dry  oral mucosa, PERRL, EOMI	  Lymphatic: No lymphadenopathy , no edema  Cardiovascular: Normal S1 S2, No JVD, No murmurs , Peripheral pulses palpable 2+ bilaterally  Respiratory: Decreased bs   Gastrointestinal:  Soft, Non-tender, + BS	  Skin: No rashes, No ecchymoses, No cyanosis, warm to touch  Musculoskeletal: Normal range of motion, normal strength  Psychiatry:  Mood & affect appropriate  Ext: No edema      LABS:    CARDIAC MARKERS:                                11.2   4.05  )-----------( 190      ( 18 Mar 2021 06:42 )             34.8     03-18    140  |  103  |  8   ----------------------------<  93  4.3   |  29  |  1.03    Ca    9.2      18 Mar 2021 06:40  Phos  3.1     03-17  Mg     1.9     03-17    TPro  6.1  /  Alb  2.7<L>  /  TBili  0.3  /  DBili  x   /  AST  21  /  ALT  25  /  AlkPhos  44  03-18    proBNP:   Lipid Profile:   HgA1c:   TSH:             TELEMETRY: SR 	    ECG:  	  RADIOLOGY:   DIAGNOSTIC TESTING:  [ ] Echocardiogram:  [ ]  Catheterization:  [ ] Stress Test:    OTHER:

## 2021-03-19 LAB
ALBUMIN SERPL ELPH-MCNC: 2.7 G/DL — LOW (ref 3.3–5)
ALP SERPL-CCNC: 44 U/L — SIGNIFICANT CHANGE UP (ref 40–120)
ALT FLD-CCNC: 24 U/L — SIGNIFICANT CHANGE UP (ref 10–45)
ANION GAP SERPL CALC-SCNC: 9 MMOL/L — SIGNIFICANT CHANGE UP (ref 5–17)
AST SERPL-CCNC: 21 U/L — SIGNIFICANT CHANGE UP (ref 10–40)
BILIRUB SERPL-MCNC: 0.3 MG/DL — SIGNIFICANT CHANGE UP (ref 0.2–1.2)
BUN SERPL-MCNC: 8 MG/DL — SIGNIFICANT CHANGE UP (ref 7–23)
CALCIUM SERPL-MCNC: 9 MG/DL — SIGNIFICANT CHANGE UP (ref 8.4–10.5)
CHLORIDE SERPL-SCNC: 106 MMOL/L — SIGNIFICANT CHANGE UP (ref 96–108)
CO2 SERPL-SCNC: 28 MMOL/L — SIGNIFICANT CHANGE UP (ref 22–31)
CREAT SERPL-MCNC: 1.08 MG/DL — SIGNIFICANT CHANGE UP (ref 0.5–1.3)
CRP SERPL-MCNC: 1.28 MG/DL — HIGH (ref 0–0.4)
D DIMER BLD IA.RAPID-MCNC: 191 NG/ML DDU — SIGNIFICANT CHANGE UP
FERRITIN SERPL-MCNC: 478 NG/ML — HIGH (ref 30–400)
GLUCOSE SERPL-MCNC: 79 MG/DL — SIGNIFICANT CHANGE UP (ref 70–99)
HCT VFR BLD CALC: 34.5 % — LOW (ref 39–50)
HGB BLD-MCNC: 11 G/DL — LOW (ref 13–17)
MAGNESIUM SERPL-MCNC: 1.9 MG/DL — SIGNIFICANT CHANGE UP (ref 1.6–2.6)
MCHC RBC-ENTMCNC: 29.3 PG — SIGNIFICANT CHANGE UP (ref 27–34)
MCHC RBC-ENTMCNC: 31.9 GM/DL — LOW (ref 32–36)
MCV RBC AUTO: 92 FL — SIGNIFICANT CHANGE UP (ref 80–100)
NRBC # BLD: 0 /100 WBCS — SIGNIFICANT CHANGE UP (ref 0–0)
PHOSPHATE SERPL-MCNC: 3.2 MG/DL — SIGNIFICANT CHANGE UP (ref 2.5–4.5)
PLATELET # BLD AUTO: 195 K/UL — SIGNIFICANT CHANGE UP (ref 150–400)
POTASSIUM SERPL-MCNC: 4.3 MMOL/L — SIGNIFICANT CHANGE UP (ref 3.5–5.3)
POTASSIUM SERPL-SCNC: 4.3 MMOL/L — SIGNIFICANT CHANGE UP (ref 3.5–5.3)
PROT SERPL-MCNC: 6 G/DL — SIGNIFICANT CHANGE UP (ref 6–8.3)
RBC # BLD: 3.75 M/UL — LOW (ref 4.2–5.8)
RBC # FLD: 13.2 % — SIGNIFICANT CHANGE UP (ref 10.3–14.5)
SODIUM SERPL-SCNC: 143 MMOL/L — SIGNIFICANT CHANGE UP (ref 135–145)
WBC # BLD: 4.06 K/UL — SIGNIFICANT CHANGE UP (ref 3.8–10.5)
WBC # FLD AUTO: 4.06 K/UL — SIGNIFICANT CHANGE UP (ref 3.8–10.5)

## 2021-03-19 RX ADMIN — ENOXAPARIN SODIUM 140 MILLIGRAM(S): 100 INJECTION SUBCUTANEOUS at 05:49

## 2021-03-19 RX ADMIN — Medication 10 MILLILITER(S): at 05:49

## 2021-03-19 RX ADMIN — Medication 50 MILLIGRAM(S): at 17:21

## 2021-03-19 RX ADMIN — Medication 50 MILLIGRAM(S): at 05:49

## 2021-03-19 RX ADMIN — Medication 10 MILLILITER(S): at 17:21

## 2021-03-19 RX ADMIN — Medication 100 MILLIGRAM(S): at 05:49

## 2021-03-19 RX ADMIN — ENOXAPARIN SODIUM 140 MILLIGRAM(S): 100 INJECTION SUBCUTANEOUS at 17:21

## 2021-03-19 RX ADMIN — Medication 10 MILLILITER(S): at 12:50

## 2021-03-19 RX ADMIN — Medication 50 MILLIGRAM(S): at 12:50

## 2021-03-19 RX ADMIN — Medication 100 MILLIGRAM(S): at 13:35

## 2021-03-19 RX ADMIN — Medication 100 MILLIGRAM(S): at 22:03

## 2021-03-19 RX ADMIN — Medication 50 MILLIGRAM(S): at 00:19

## 2021-03-19 RX ADMIN — Medication 10 MILLILITER(S): at 00:19

## 2021-03-19 NOTE — PROGRESS NOTE ADULT - SUBJECTIVE AND OBJECTIVE BOX
Infectious Diseases progress note:    Subjective:     ROS:  CONSTITUTIONAL:  No fever, chills, rigors  CARDIOVASCULAR:  No chest pain or palpitations  RESPIRATORY:   No SOB, cough, dyspnea on exertion.  No wheezing  GASTROINTESTINAL:  No abd pain, N/V, diarrhea/constipation  EXTREMITIES:  No swelling or joint pain  GENITOURINARY:  No burning on urination, increased frequency or urgency.  No flank pain  NEUROLOGIC:  No HA, visual disturbances  SKIN: No rashes    Allergies    Actifed (Headache)  Sudafed (Headache)    Intolerances        ANTIBIOTICS/RELEVANT:  antimicrobials    immunologic:    OTHER:  acetaminophen   Tablet .. 650 milliGRAM(s) Oral every 6 hours PRN  benzonatate 100 milliGRAM(s) Oral every 8 hours  enoxaparin Injectable 140 milliGRAM(s) SubCutaneous every 12 hours  guaifenesin/dextromethorphan  Syrup 10 milliLiter(s) Oral every 6 hours  metoprolol tartrate 50 milliGRAM(s) Oral every 6 hours  sodium chloride 0.65% Nasal 1 Spray(s) Both Nostrils once      Objective:  Vital Signs Last 24 Hrs  T(C): 36.8 (19 Mar 2021 11:52), Max: 37 (19 Mar 2021 05:02)  T(F): 98.2 (19 Mar 2021 11:52), Max: 98.6 (19 Mar 2021 05:02)  HR: 75 (19 Mar 2021 16:03) (72 - 105)  BP: 112/77 (19 Mar 2021 12:53) (110/71 - 147/88)  BP(mean): --  RR: 20 (19 Mar 2021 16:02) (18 - 24)  SpO2: 95% (19 Mar 2021 16:03) (90% - 99%)    PHYSICAL EXAM:  Constitutional:NAD  Eyes:KRISTEN, EOMI  Ear/Nose/Throat: no thrush, mucositis.  Moist mucous membranes	  Neck:no JVD, no lymphadenopathy, supple  Respiratory: CTA berna  Cardiovascular: S1S2 RRR, no murmurs  Gastrointestinal:soft, nontender,  nondistended (+) BS  Extremities:no e/e/c  Skin:  no rashes, open wounds or ulcerations        LABS:                        11.0   4.06  )-----------( 195      ( 19 Mar 2021 05:38 )             34.5     03-19    143  |  106  |  8   ----------------------------<  79  4.3   |  28  |  1.08    Ca    9.0      19 Mar 2021 05:37  Phos  3.2     03-19  Mg     1.9     03-19    TPro  6.0  /  Alb  2.7<L>  /  TBili  0.3  /  DBili  x   /  AST  21  /  ALT  24  /  AlkPhos  44  03-19          Procalcitonin, Serum: 0.58 (02-27 @ 13:25)                    MICROBIOLOGY:          RADIOLOGY & ADDITIONAL STUDIES:     Infectious Diseases progress note:    Subjective:  Sitting comfortably, on VM mask.  Afebrile.     ROS:  CONSTITUTIONAL:  No fever, chills, rigors  CARDIOVASCULAR:  No chest pain or palpitations  RESPIRATORY:   No SOB, cough, dyspnea on exertion.  No wheezing  GASTROINTESTINAL:  No abd pain, N/V, diarrhea/constipation  EXTREMITIES:  No swelling or joint pain  GENITOURINARY:  No burning on urination, increased frequency or urgency.  No flank pain  NEUROLOGIC:  No HA, visual disturbances  SKIN: No rashes    Allergies    Actifed (Headache)  Sudafed (Headache)    Intolerances        ANTIBIOTICS/RELEVANT:  antimicrobials    immunologic:    OTHER:  acetaminophen   Tablet .. 650 milliGRAM(s) Oral every 6 hours PRN  benzonatate 100 milliGRAM(s) Oral every 8 hours  enoxaparin Injectable 140 milliGRAM(s) SubCutaneous every 12 hours  guaifenesin/dextromethorphan  Syrup 10 milliLiter(s) Oral every 6 hours  metoprolol tartrate 50 milliGRAM(s) Oral every 6 hours  sodium chloride 0.65% Nasal 1 Spray(s) Both Nostrils once      Objective:  Vital Signs Last 24 Hrs  T(C): 36.8 (19 Mar 2021 11:52), Max: 37 (19 Mar 2021 05:02)  T(F): 98.2 (19 Mar 2021 11:52), Max: 98.6 (19 Mar 2021 05:02)  HR: 75 (19 Mar 2021 16:03) (72 - 105)  BP: 112/77 (19 Mar 2021 12:53) (110/71 - 147/88)  BP(mean): --  RR: 20 (19 Mar 2021 16:02) (18 - 24)  SpO2: 95% (19 Mar 2021 16:03) (90% - 99%)    PHYSICAL EXAM:  Constitutional:NAD  Eyes:KRISTEN, EOMI  Ear/Nose/Throat: no thrush, mucositis.  Moist mucous membranes	  Neck:no JVD, no lymphadenopathy, supple  Respiratory: CTA berna  Cardiovascular: S1S2 RRR, no murmurs  Gastrointestinal:soft, nontender,  nondistended (+) BS  Extremities:no e/e/c  Skin:  no rashes, open wounds or ulcerations        LABS:                        11.0   4.06  )-----------( 195      ( 19 Mar 2021 05:38 )             34.5     03-19    143  |  106  |  8   ----------------------------<  79  4.3   |  28  |  1.08    Ca    9.0      19 Mar 2021 05:37  Phos  3.2     03-19  Mg     1.9     03-19    TPro  6.0  /  Alb  2.7<L>  /  TBili  0.3  /  DBili  x   /  AST  21  /  ALT  24  /  AlkPhos  44  03-19          Procalcitonin, Serum: 0.58 (02-27 @ 13:25)        MICROBIOLOGY:          RADIOLOGY & ADDITIONAL STUDIES:    < from: Xray Chest 1 View- PORTABLE-Routine (Xray Chest 1 View- PORTABLE-Routine .) (03.08.21 @ 15:49) >      INTERPRETATION:  A single chest x-ray was obtained on March 8, 2021.    Indication: Severe hypoxic.    Impression:    The heart is slightly enlarged. Airway opacities are seen in both lungs compatible with multifocal pneumonia such as seen in Covid 19. This appears to be slightly worsened when compared to previous study done March 1, 2021 at 5:49 PM.    < end of copied text >

## 2021-03-19 NOTE — PROGRESS NOTE ADULT - ASSESSMENT
The patient is a 65y Male, hx of morbid obesity, HTN on lisinopril, complaining of shortness of breath. covid + dx 5d prior. no home O2 requirement, former smoker. has dyspnea at rest and on exertion. has not taken steroids, no hospitalization for covid. denies CP. [facility] (27 Feb 2021 22:29)    ER vital:  T 98.4, P 178, /85.  Pt satting 85% on RA, placed on nasal cannula, advanced to NRB.  Covid pcr (+), covid serologies (-).   CT angio chest (-) for PE, (+) b/l patchy opacities.  Pt with elevated AST (>5x ULN but <10x) and ALT.   Pt started on remdesivir and dexamethasone.     Acute covid illness:    - Cont remdesivir and dexamethasone.  Monitor daily LFTs and renal function while pt on remdesivir.  LFTs elevated, possibly secondary to viral infection.  Currently ALT < 10x ULN, can cont remdesivir.  LFTs slowly improving.     - Monitor pulse ox, pt on supplemental O2 - now on Bipap    - Monitor inflammatory markers q72    DD:  192 <-- 272 <-- 359 <-- 248 <-- 213 <-- 282 <-- 343  ferritin: 666 <-- 692 <-- 1468 <-- 1636  pct:  0.58  crp: 1.28 <-- 3.0 <-- 2.2 <-- 10.3 <-- 10.9    - Pt on full dose lovenox for dvt/pe.  CT angio chest (-) for PE.   LE dopplers neg DVT    *  Repeat cxr 3/8 with continued b/l patchy opacities with slight worsening.  No indication for abx at this time.  Pt on VM mask.  Repeat covid swab sent 3/16 positive.  VM reduced to 35%.  pt remains on isolation.     Wandy Quinonesi  [phone]

## 2021-03-19 NOTE — PROGRESS NOTE ADULT - SUBJECTIVE AND OBJECTIVE BOX
Follow-up Pulm Progress Note  Francisco Ramon MD  [phone]    Stable resp status: OOB in chair  Ventimask reduced to 35% (reportedly desaturated on nasal cannula)      Vital Signs Last 24 Hrs  T(C): 36.8 (19 Mar 2021 11:52), Max: 37 (19 Mar 2021 05:02)  T(F): 98.2 (19 Mar 2021 11:52), Max: 98.6 (19 Mar 2021 05:02)  HR: 73 (19 Mar 2021 12:53) (72 - 105)  BP: 112/77 (19 Mar 2021 12:53) (110/71 - 147/88)  BP(mean): --  RR: 20 (19 Mar 2021 11:57) (18 - 24)  SpO2: 92% (19 Mar 2021 12:53) (90% - 99%)                          11.0   4.06  )-----------( 195      ( 19 Mar 2021 05:38 )             34.5       03-19    143  |  106  |  8   ----------------------------<  79  4.3   |  28  |  1.08    Ca    9.0      19 Mar 2021 05:37  Phos  3.2     03-19  Mg     1.9     03-19    TPro  6.0  /  Alb  2.7<L>  /  TBili  0.3  /  DBili  x   /  AST  21  /  ALT  24  /  AlkPhos  44  03-19      CULTURES:  Culture Results:   No growth to date. (02-27 @ 18:02)  Culture Results:   No growth to date. (02-27 @ 18:02)    Most recent blood culture -- 02-27 @ 18:02   -- -- .Blood Blood 02-27 @ 18:02      Physical Examination:  PULM: No wheeze  CVS: Regular rate and rhythm, no murmurs, rubs, or gallops  ABD: Soft, non-tender  EXT:  No clubbing, cyanosis, or edema    RADIOLOGY REVIEWED  CXR:    CT chest:    PROCEDURE DATE:  02/27/2021        INTERPRETATION:  CLINICAL INFORMATION: Hypoxia, Covid 19 infection, assess for pulmonary embolism    COMPARISON: Chest radiograph from same day    PROCEDURE:  CT Angiography of the Chest.  90 ml of Omnipaque 350 was injected intravenously. 10 ml were discarded.  Sagittal and coronal reformats were performed as well as 3D (MIP) reconstructions.    FINDINGS:    LUNGS AND AIRWAYS: Patent central airways.  Bilateral patchy groundglass and consolidative opacities with peripheral and lower lobe predominance.  PLEURA: No pleural effusion.  MEDIASTINUM AND KASIE: No lymphadenopathy.  VESSELS: No central, main, lobar, or segmental pulmonary embolism. Evaluation of the subsegmental pulmonary arteries is limited/nondiagnostic secondary to respiratory motion.  HEART: Heart size is normal. No pericardial effusion.  CHEST WALL AND LOWER NECK: Prominent right upper paratracheal lymph node may be reactive  VISUALIZED UPPER ABDOMEN: Small hiatal hernia.  BONES: Spinal degenerative changes.    IMPRESSION:  No central, main, lobar, or segmental pulmonary embolism. Evaluation of the subsegmental pulmonary arteries is limited/nondiagnostic secondary to respiratory motion.    Bilateral patchy groundglass and consolidative opacities with peripheral and lower lobe predominance, compatible with patient's known Covid 19 infection. Follow-up to resolution recommended.    TTE:

## 2021-03-19 NOTE — PROGRESS NOTE ADULT - SUBJECTIVE AND OBJECTIVE BOX
Subjective: Patient seen and examined. No new events except as noted.   remains on Ventimask   CPAP overnight     REVIEW OF SYSTEMS:    CONSTITUTIONAL: + weakness, fevers or chills  EYES/ENT: No visual changes;  No vertigo or throat pain   NECK: No pain or stiffness  RESPIRATORY: No cough, wheezing, hemoptysis; No shortness of breath  CARDIOVASCULAR: No chest pain or palpitations  GASTROINTESTINAL: No abdominal or epigastric pain. No nausea, vomiting, or hematemesis; No diarrhea or constipation. No melena or hematochezia.  GENITOURINARY: No dysuria, frequency or hematuria  NEUROLOGICAL: No numbness or weakness  SKIN: No itching, burning, rashes, or lesions   All other review of systems is negative unless indicated above.    MEDICATIONS:  MEDICATIONS  (STANDING):  benzonatate 100 milliGRAM(s) Oral every 8 hours  enoxaparin Injectable 140 milliGRAM(s) SubCutaneous every 12 hours  guaifenesin/dextromethorphan  Syrup 10 milliLiter(s) Oral every 6 hours  metoprolol tartrate 50 milliGRAM(s) Oral every 6 hours  sodium chloride 0.65% Nasal 1 Spray(s) Both Nostrils once      PHYSICAL EXAM:  T(C): 37 (03-19-21 @ 05:02), Max: 37 (03-19-21 @ 05:02)  HR: 74 (03-19-21 @ 05:02) (72 - 105)  BP: 147/88 (03-19-21 @ 05:02) (121/87 - 147/88)  RR: 18 (03-19-21 @ 05:02) (18 - 24)  SpO2: 96% (03-19-21 @ 05:02) (90% - 99%)  Wt(kg): --  I&O's Summary    18 Mar 2021 07:01  -  19 Mar 2021 07:00  --------------------------------------------------------  IN: 600 mL / OUT: 1650 mL / NET: -1050 mL          Appearance: NAD	  HEENT:   Dry  oral mucosa, PERRL, EOMI	  Lymphatic: No lymphadenopathy , no edema  Cardiovascular: Normal S1 S2, No JVD, No murmurs , Peripheral pulses palpable 2+ bilaterally  Respiratory: Decreased bs	  Gastrointestinal:  Soft, Non-tender, + BS	  Skin: No rashes, No ecchymoses, No cyanosis, warm to touch  Musculoskeletal: Normal range of motion, normal strength  Psychiatry:  Mood & affect appropriate  Ext: No edema      LABS:    CARDIAC MARKERS:                                11.0   4.06  )-----------( 195      ( 19 Mar 2021 05:38 )             34.5     03-19    143  |  106  |  8   ----------------------------<  79  4.3   |  28  |  1.08    Ca    9.0      19 Mar 2021 05:37  Phos  3.2     03-19  Mg     1.9     03-19    TPro  6.0  /  Alb  2.7<L>  /  TBili  0.3  /  DBili  x   /  AST  21  /  ALT  24  /  AlkPhos  44  03-19    proBNP:   Lipid Profile:   HgA1c:   TSH:             TELEMETRY: 	SR     ECG:  	  RADIOLOGY:   DIAGNOSTIC TESTING:  [ ] Echocardiogram:  [ ]  Catheterization:  [ ] Stress Test:    OTHER:

## 2021-03-19 NOTE — PROGRESS NOTE ADULT - SUBJECTIVE AND OBJECTIVE BOX
Patient is a 65y old  Male who presents with a chief complaint of acute hypoxemic respiratory failure (19 Mar 2021 16:21)      SUBJECTIVE / OVERNIGHT EVENTS: on venti mask 35%    MEDICATIONS  (STANDING):  benzonatate 100 milliGRAM(s) Oral every 8 hours  enoxaparin Injectable 140 milliGRAM(s) SubCutaneous every 12 hours  guaifenesin/dextromethorphan  Syrup 10 milliLiter(s) Oral every 6 hours  metoprolol tartrate 50 milliGRAM(s) Oral every 6 hours  sodium chloride 0.65% Nasal 1 Spray(s) Both Nostrils once    MEDICATIONS  (PRN):  acetaminophen   Tablet .. 650 milliGRAM(s) Oral every 6 hours PRN Mild Pain (1 - 3)      Vital Signs Last 24 Hrs  T(F): 98.2 (03-19-21 @ 11:52), Max: 98.6 (03-19-21 @ 05:02)  HR: 75 (03-19-21 @ 16:03) (72 - 105)  BP: 113/70 (03-19-21 @ 17:32) (110/71 - 147/88)  RR: 20 (03-19-21 @ 17:32) (18 - 24)  SpO2: 92% (03-19-21 @ 17:32) (90% - 99%)  Telemetry:   CAPILLARY BLOOD GLUCOSE        I&O's Summary    18 Mar 2021 07:01  -  19 Mar 2021 07:00  --------------------------------------------------------  IN: 600 mL / OUT: 1650 mL / NET: -1050 mL        PHYSICAL EXAM:  GENERAL: NAD, well-developed  HEAD:  Atraumatic, Normocephalic  EYES: EOMI, PERRLA, conjunctiva and sclera clear  NECK: Supple, No JVD  CHEST/LUNG: Clear to auscultation bilaterally; No wheeze  HEART: Regular rate and rhythm; No murmurs, rubs, or gallops  ABDOMEN: Soft, Nontender, Nondistended; Bowel sounds present  EXTREMITIES:  2+ Peripheral Pulses, No clubbing, cyanosis, or edema  PSYCH: AAOx3  NEUROLOGY: non-focal  SKIN: No rashes or lesions    LABS:                        11.0   4.06  )-----------( 195      ( 19 Mar 2021 05:38 )             34.5     03-19    143  |  106  |  8   ----------------------------<  79  4.3   |  28  |  1.08    Ca    9.0      19 Mar 2021 05:37  Phos  3.2     03-19  Mg     1.9     03-19    TPro  6.0  /  Alb  2.7<L>  /  TBili  0.3  /  DBili  x   /  AST  21  /  ALT  24  /  AlkPhos  44  03-19              RADIOLOGY & ADDITIONAL TESTS:    Imaging Personally Reviewed:    Consultant(s) Notes Reviewed:      Care Discussed with Consultants/Other Providers:

## 2021-03-19 NOTE — PROGRESS NOTE ADULT - ASSESSMENT
ACute hypoxemic respiratory failure  Obesity  Multilobar viral pneumonia due to COVID 19  Atrial Fibrillation: now back in NSR  Refractory hypoxemia: multifactorial - residual COVID, obesity with basilar atelectasis    REC    Continue nocturnal CPAP 8 and prn day if sleeping  Continue ventimask and transition to nasal cannula as tolerated

## 2021-03-20 LAB
ALBUMIN SERPL ELPH-MCNC: 2.6 G/DL — LOW (ref 3.3–5)
ALP SERPL-CCNC: 43 U/L — SIGNIFICANT CHANGE UP (ref 40–120)
ALT FLD-CCNC: 23 U/L — SIGNIFICANT CHANGE UP (ref 10–45)
ANION GAP SERPL CALC-SCNC: 11 MMOL/L — SIGNIFICANT CHANGE UP (ref 5–17)
AST SERPL-CCNC: 20 U/L — SIGNIFICANT CHANGE UP (ref 10–40)
BILIRUB SERPL-MCNC: 0.3 MG/DL — SIGNIFICANT CHANGE UP (ref 0.2–1.2)
BUN SERPL-MCNC: 10 MG/DL — SIGNIFICANT CHANGE UP (ref 7–23)
CALCIUM SERPL-MCNC: 8.9 MG/DL — SIGNIFICANT CHANGE UP (ref 8.4–10.5)
CHLORIDE SERPL-SCNC: 105 MMOL/L — SIGNIFICANT CHANGE UP (ref 96–108)
CO2 SERPL-SCNC: 26 MMOL/L — SIGNIFICANT CHANGE UP (ref 22–31)
CREAT SERPL-MCNC: 1.02 MG/DL — SIGNIFICANT CHANGE UP (ref 0.5–1.3)
GLUCOSE SERPL-MCNC: 80 MG/DL — SIGNIFICANT CHANGE UP (ref 70–99)
HCT VFR BLD CALC: 33.7 % — LOW (ref 39–50)
HGB BLD-MCNC: 10.9 G/DL — LOW (ref 13–17)
MAGNESIUM SERPL-MCNC: 1.8 MG/DL — SIGNIFICANT CHANGE UP (ref 1.6–2.6)
MCHC RBC-ENTMCNC: 29.6 PG — SIGNIFICANT CHANGE UP (ref 27–34)
MCHC RBC-ENTMCNC: 32.3 GM/DL — SIGNIFICANT CHANGE UP (ref 32–36)
MCV RBC AUTO: 91.6 FL — SIGNIFICANT CHANGE UP (ref 80–100)
NRBC # BLD: 0 /100 WBCS — SIGNIFICANT CHANGE UP (ref 0–0)
PHOSPHATE SERPL-MCNC: 3.1 MG/DL — SIGNIFICANT CHANGE UP (ref 2.5–4.5)
PLATELET # BLD AUTO: 192 K/UL — SIGNIFICANT CHANGE UP (ref 150–400)
POTASSIUM SERPL-MCNC: 3.5 MMOL/L — SIGNIFICANT CHANGE UP (ref 3.5–5.3)
POTASSIUM SERPL-SCNC: 3.5 MMOL/L — SIGNIFICANT CHANGE UP (ref 3.5–5.3)
PROT SERPL-MCNC: 5.8 G/DL — LOW (ref 6–8.3)
RBC # BLD: 3.68 M/UL — LOW (ref 4.2–5.8)
RBC # FLD: 13.2 % — SIGNIFICANT CHANGE UP (ref 10.3–14.5)
SODIUM SERPL-SCNC: 142 MMOL/L — SIGNIFICANT CHANGE UP (ref 135–145)
WBC # BLD: 4.07 K/UL — SIGNIFICANT CHANGE UP (ref 3.8–10.5)
WBC # FLD AUTO: 4.07 K/UL — SIGNIFICANT CHANGE UP (ref 3.8–10.5)

## 2021-03-20 RX ADMIN — Medication 100 MILLIGRAM(S): at 13:09

## 2021-03-20 RX ADMIN — Medication 50 MILLIGRAM(S): at 12:36

## 2021-03-20 RX ADMIN — Medication 50 MILLIGRAM(S): at 23:04

## 2021-03-20 RX ADMIN — ENOXAPARIN SODIUM 140 MILLIGRAM(S): 100 INJECTION SUBCUTANEOUS at 17:20

## 2021-03-20 RX ADMIN — Medication 10 MILLILITER(S): at 17:21

## 2021-03-20 RX ADMIN — Medication 10 MILLILITER(S): at 05:24

## 2021-03-20 RX ADMIN — Medication 50 MILLIGRAM(S): at 05:24

## 2021-03-20 RX ADMIN — Medication 10 MILLILITER(S): at 22:16

## 2021-03-20 RX ADMIN — Medication 10 MILLILITER(S): at 12:36

## 2021-03-20 RX ADMIN — ENOXAPARIN SODIUM 140 MILLIGRAM(S): 100 INJECTION SUBCUTANEOUS at 05:24

## 2021-03-20 RX ADMIN — Medication 10 MILLILITER(S): at 00:06

## 2021-03-20 RX ADMIN — Medication 100 MILLIGRAM(S): at 22:15

## 2021-03-20 RX ADMIN — Medication 100 MILLIGRAM(S): at 05:23

## 2021-03-20 RX ADMIN — Medication 50 MILLIGRAM(S): at 00:06

## 2021-03-20 RX ADMIN — Medication 50 MILLIGRAM(S): at 17:21

## 2021-03-20 NOTE — PROGRESS NOTE ADULT - SUBJECTIVE AND OBJECTIVE BOX
Patient is a 65y old  Male who presents with a chief complaint of acute hypoxemic respiratory failure (20 Mar 2021 13:47)      SUBJECTIVE / OVERNIGHT EVENTS: tolerating 6L NC    MEDICATIONS  (STANDING):  benzonatate 100 milliGRAM(s) Oral every 8 hours  enoxaparin Injectable 140 milliGRAM(s) SubCutaneous every 12 hours  guaifenesin/dextromethorphan  Syrup 10 milliLiter(s) Oral every 6 hours  metoprolol tartrate 50 milliGRAM(s) Oral every 6 hours  sodium chloride 0.65% Nasal 1 Spray(s) Both Nostrils once    MEDICATIONS  (PRN):  acetaminophen   Tablet .. 650 milliGRAM(s) Oral every 6 hours PRN Mild Pain (1 - 3)      Vital Signs Last 24 Hrs  T(F): 98.2 (03-20-21 @ 20:41), Max: 98.4 (03-20-21 @ 04:53)  HR: 65 (03-20-21 @ 20:41) (65 - 87)  BP: 112/75 (03-20-21 @ 20:41) (112/75 - 148/80)  RR: 18 (03-20-21 @ 20:41) (17 - 19)  SpO2: 96% (03-20-21 @ 20:41) (91% - 97%)  Telemetry:   CAPILLARY BLOOD GLUCOSE        I&O's Summary    19 Mar 2021 07:01  -  20 Mar 2021 07:00  --------------------------------------------------------  IN: 480 mL / OUT: 600 mL / NET: -120 mL    20 Mar 2021 07:01  -  20 Mar 2021 22:56  --------------------------------------------------------  IN: 1380 mL / OUT: 450 mL / NET: 930 mL        PHYSICAL EXAM:  GENERAL: NAD, well-developed  HEAD:  Atraumatic, Normocephalic  EYES: EOMI, PERRLA, conjunctiva and sclera clear  NECK: Supple, No JVD  CHEST/LUNG: Clear to auscultation bilaterally; No wheeze  HEART: Regular rate and rhythm; No murmurs, rubs, or gallops  ABDOMEN: Soft, Nontender, Nondistended; Bowel sounds present  EXTREMITIES:  2+ Peripheral Pulses, No clubbing, cyanosis, or edema  PSYCH: AAOx3  NEUROLOGY: non-focal  SKIN: No rashes or lesions    LABS:                        10.9   4.07  )-----------( 192      ( 20 Mar 2021 05:48 )             33.7     03-20    142  |  105  |  10  ----------------------------<  80  3.5   |  26  |  1.02    Ca    8.9      20 Mar 2021 05:48  Phos  3.1     03-20  Mg     1.8     03-20    TPro  5.8<L>  /  Alb  2.6<L>  /  TBili  0.3  /  DBili  x   /  AST  20  /  ALT  23  /  AlkPhos  43  03-20              RADIOLOGY & ADDITIONAL TESTS:    Imaging Personally Reviewed:    Consultant(s) Notes Reviewed:      Care Discussed with Consultants/Other Providers:

## 2021-03-20 NOTE — PROGRESS NOTE ADULT - ASSESSMENT
ACute hypoxemic respiratory failure  Obesity  Multilobar viral pneumonia due to COVID 19  Atrial Fibrillation: now back in NSR  Refractory hypoxemia: multifactorial - residual COVID, obesity with basilar atelectasis    REC    Continue nocturnal CPAP 8 and prn day if sleeping  Taper nasal oxygen: target sat 88-90%

## 2021-03-20 NOTE — PROGRESS NOTE ADULT - SUBJECTIVE AND OBJECTIVE BOX
Subjective: Patient seen and examined. No new events except as noted.     REVIEW OF SYSTEMS:    CONSTITUTIONAL: No weakness, fevers or chills  EYES/ENT: No visual changes;  No vertigo or throat pain   NECK: No pain or stiffness  RESPIRATORY: No cough, wheezing, hemoptysis; No shortness of breath  CARDIOVASCULAR: No chest pain or palpitations  GASTROINTESTINAL: No abdominal or epigastric pain. No nausea, vomiting, or hematemesis; No diarrhea or constipation. No melena or hematochezia.  GENITOURINARY: No dysuria, frequency or hematuria  NEUROLOGICAL: No numbness or weakness  SKIN: No itching, burning, rashes, or lesions   All other review of systems is negative unless indicated above.    MEDICATIONS:  MEDICATIONS  (STANDING):  benzonatate 100 milliGRAM(s) Oral every 8 hours  enoxaparin Injectable 140 milliGRAM(s) SubCutaneous every 12 hours  guaifenesin/dextromethorphan  Syrup 10 milliLiter(s) Oral every 6 hours  metoprolol tartrate 50 milliGRAM(s) Oral every 6 hours  sodium chloride 0.65% Nasal 1 Spray(s) Both Nostrils once      PHYSICAL EXAM:  T(C): 36.8 (03-20-21 @ 20:41), Max: 36.9 (03-20-21 @ 04:53)  HR: 65 (03-20-21 @ 20:41) (65 - 87)  BP: 112/75 (03-20-21 @ 20:41) (112/75 - 148/80)  RR: 18 (03-20-21 @ 20:41) (17 - 19)  SpO2: 96% (03-20-21 @ 20:41) (91% - 97%)  Wt(kg): --  I&O's Summary    19 Mar 2021 07:01  -  20 Mar 2021 07:00  --------------------------------------------------------  IN: 480 mL / OUT: 600 mL / NET: -120 mL    20 Mar 2021 07:01  -  20 Mar 2021 22:58  --------------------------------------------------------  IN: 1380 mL / OUT: 450 mL / NET: 930 mL          Appearance: NAD	  HEENT:   Normal oral mucosa, PERRL, EOMI	  Lymphatic: No lymphadenopathy , no edema  Cardiovascular: Normal S1 S2, No JVD, No murmurs , Peripheral pulses palpable 2+ bilaterally  Respiratory: Lungs clear to auscultation, normal effort 	  Gastrointestinal:  Soft, Non-tender, + BS	  Skin: No rashes, No ecchymoses, No cyanosis, warm to touch  Musculoskeletal: Normal range of motion, normal strength  Psychiatry:  Mood & affect appropriate  Ext: No edema      LABS:    CARDIAC MARKERS:                                10.9   4.07  )-----------( 192      ( 20 Mar 2021 05:48 )             33.7     03-20    142  |  105  |  10  ----------------------------<  80  3.5   |  26  |  1.02    Ca    8.9      20 Mar 2021 05:48  Phos  3.1     03-20  Mg     1.8     03-20    TPro  5.8<L>  /  Alb  2.6<L>  /  TBili  0.3  /  DBili  x   /  AST  20  /  ALT  23  /  AlkPhos  43  03-20    proBNP:   Lipid Profile:   HgA1c:   TSH:             TELEMETRY: 	    ECG:  	  RADIOLOGY:   DIAGNOSTIC TESTING:  [ ] Echocardiogram:  [ ]  Catheterization:  [ ] Stress Test:    OTHER:

## 2021-03-20 NOTE — PROGRESS NOTE ADULT - SUBJECTIVE AND OBJECTIVE BOX
Follow-up Pulm Progress Note  Francisco Ramon MD  [phone]    Tolerating nasal cannula today, albeit at 8 llters/min  Occ transient desaturation, mostly > 90%  No new resp complaints      Vital Signs Last 24 Hrs  T(C): 36.8 (20 Mar 2021 11:33), Max: 36.9 (20 Mar 2021 04:53)  T(F): 98.3 (20 Mar 2021 11:33), Max: 98.4 (20 Mar 2021 04:53)  HR: 87 (20 Mar 2021 12:08) (68 - 87)  BP: 148/80 (20 Mar 2021 11:33) (113/70 - 148/80)  BP(mean): --  RR: 17 (20 Mar 2021 11:33) (17 - 20)  SpO2: 96% (20 Mar 2021 12:08) (90% - 97%)                          10.9   4.07  )-----------( 192      ( 20 Mar 2021 05:48 )             33.7       03-20    142  |  105  |  10  ----------------------------<  80  3.5   |  26  |  1.02    Ca    8.9      20 Mar 2021 05:48  Phos  3.1     03-20  Mg     1.8     03-20    TPro  5.8<L>  /  Alb  2.6<L>  /  TBili  0.3  /  DBili  x   /  AST  20  /  ALT  23  /  AlkPhos  43  03-20      CULTURES:  Culture Results:   No growth to date. (02-27 @ 18:02)  Culture Results:   No growth to date. (02-27 @ 18:02)    Most recent blood culture -- 02-27 @ 18:02   -- -- .Blood Blood 02-27 @ 18:02      Physical Examination:  PULM: No wheeze  CVS: Regular rate and rhythm, no murmurs, rubs, or gallops  ABD: Soft, non-tender  EXT:  No clubbing, cyanosis, or edema    RADIOLOGY REVIEWED  CXR:    CT chest:    PROCEDURE DATE:  02/27/2021        INTERPRETATION:  CLINICAL INFORMATION: Hypoxia, Covid 19 infection, assess for pulmonary embolism    COMPARISON: Chest radiograph from same day    PROCEDURE:  CT Angiography of the Chest.  90 ml of Omnipaque 350 was injected intravenously. 10 ml were discarded.  Sagittal and coronal reformats were performed as well as 3D (MIP) reconstructions.    FINDINGS:    LUNGS AND AIRWAYS: Patent central airways.  Bilateral patchy groundglass and consolidative opacities with peripheral and lower lobe predominance.  PLEURA: No pleural effusion.  MEDIASTINUM AND KASIE: No lymphadenopathy.  VESSELS: No central, main, lobar, or segmental pulmonary embolism. Evaluation of the subsegmental pulmonary arteries is limited/nondiagnostic secondary to respiratory motion.  HEART: Heart size is normal. No pericardial effusion.  CHEST WALL AND LOWER NECK: Prominent right upper paratracheal lymph node may be reactive  VISUALIZED UPPER ABDOMEN: Small hiatal hernia.  BONES: Spinal degenerative changes.    IMPRESSION:  No central, main, lobar, or segmental pulmonary embolism. Evaluation of the subsegmental pulmonary arteries is limited/nondiagnostic secondary to respiratory motion.    Bilateral patchy groundglass and consolidative opacities with peripheral and lower lobe predominance, compatible with patient's known Covid 19 infection. Follow-up to resolution recommended.    TTE:

## 2021-03-20 NOTE — PROGRESS NOTE ADULT - ASSESSMENT
65 with covid-19, hypoxic resp failure Interpolation Flap Text: A decision was made to reconstruct the defect utilizing an interpolation axial flap and a staged reconstruction.  A telfa template was made of the defect.  This telfa template was then used to outline the interpolation flap.  The donor area for the pedicle flap was then injected with anesthesia.  The flap was excised through the skin and subcutaneous tissue down to the layer of the underlying musculature.  The interpolation flap was carefully excised within this deep plane to maintain its blood supply.  The edges of the donor site were undermined.   The donor site was closed in a primary fashion.  The pedicle was then rotated into position and sutured.  Once the tube was sutured into place, adequate blood supply was confirmed with blanching and refill.  The pedicle was then wrapped with xeroform gauze and dressed appropriately with a telfa and gauze bandage to ensure continued blood supply and protect the attached pedicle.

## 2021-03-21 LAB
ANION GAP SERPL CALC-SCNC: 9 MMOL/L — SIGNIFICANT CHANGE UP (ref 5–17)
BUN SERPL-MCNC: 10 MG/DL — SIGNIFICANT CHANGE UP (ref 7–23)
CALCIUM SERPL-MCNC: 8.6 MG/DL — SIGNIFICANT CHANGE UP (ref 8.4–10.5)
CHLORIDE SERPL-SCNC: 106 MMOL/L — SIGNIFICANT CHANGE UP (ref 96–108)
CO2 SERPL-SCNC: 30 MMOL/L — SIGNIFICANT CHANGE UP (ref 22–31)
CREAT SERPL-MCNC: 1.03 MG/DL — SIGNIFICANT CHANGE UP (ref 0.5–1.3)
CRP SERPL-MCNC: 0.81 MG/DL — HIGH (ref 0–0.4)
D DIMER BLD IA.RAPID-MCNC: 162 NG/ML DDU — SIGNIFICANT CHANGE UP
FERRITIN SERPL-MCNC: 407 NG/ML — HIGH (ref 30–400)
GLUCOSE SERPL-MCNC: 78 MG/DL — SIGNIFICANT CHANGE UP (ref 70–99)
MAGNESIUM SERPL-MCNC: 1.9 MG/DL — SIGNIFICANT CHANGE UP (ref 1.6–2.6)
POTASSIUM SERPL-MCNC: 4.1 MMOL/L — SIGNIFICANT CHANGE UP (ref 3.5–5.3)
POTASSIUM SERPL-SCNC: 4.1 MMOL/L — SIGNIFICANT CHANGE UP (ref 3.5–5.3)
SODIUM SERPL-SCNC: 145 MMOL/L — SIGNIFICANT CHANGE UP (ref 135–145)

## 2021-03-21 RX ADMIN — Medication 10 MILLILITER(S): at 11:27

## 2021-03-21 RX ADMIN — Medication 100 MILLIGRAM(S): at 05:49

## 2021-03-21 RX ADMIN — Medication 50 MILLIGRAM(S): at 05:49

## 2021-03-21 RX ADMIN — Medication 50 MILLIGRAM(S): at 11:26

## 2021-03-21 RX ADMIN — Medication 650 MILLIGRAM(S): at 15:00

## 2021-03-21 RX ADMIN — ENOXAPARIN SODIUM 140 MILLIGRAM(S): 100 INJECTION SUBCUTANEOUS at 05:49

## 2021-03-21 RX ADMIN — Medication 50 MILLIGRAM(S): at 17:28

## 2021-03-21 RX ADMIN — ENOXAPARIN SODIUM 140 MILLIGRAM(S): 100 INJECTION SUBCUTANEOUS at 17:28

## 2021-03-21 RX ADMIN — Medication 100 MILLIGRAM(S): at 13:27

## 2021-03-21 RX ADMIN — Medication 650 MILLIGRAM(S): at 14:04

## 2021-03-21 RX ADMIN — Medication 100 MILLIGRAM(S): at 22:21

## 2021-03-21 RX ADMIN — Medication 10 MILLILITER(S): at 17:28

## 2021-03-21 RX ADMIN — Medication 10 MILLILITER(S): at 05:48

## 2021-03-21 NOTE — PROGRESS NOTE ADULT - SUBJECTIVE AND OBJECTIVE BOX
Subjective: Patient seen and examined. No new events except as noted.   on nasal cannula   CPAP overnight     REVIEW OF SYSTEMS:    CONSTITUTIONAL: No weakness, fevers or chills  EYES/ENT: No visual changes;  No vertigo or throat pain   NECK: No pain or stiffness  RESPIRATORY: No cough, wheezing, hemoptysis; No shortness of breath  CARDIOVASCULAR: No chest pain or palpitations  GASTROINTESTINAL: No abdominal or epigastric pain. No nausea, vomiting, or hematemesis; No diarrhea or constipation. No melena or hematochezia.  GENITOURINARY: No dysuria, frequency or hematuria  NEUROLOGICAL: No numbness or weakness  SKIN: No itching, burning, rashes, or lesions   All other review of systems is negative unless indicated above.    MEDICATIONS:  MEDICATIONS  (STANDING):  benzonatate 100 milliGRAM(s) Oral every 8 hours  enoxaparin Injectable 140 milliGRAM(s) SubCutaneous every 12 hours  guaifenesin/dextromethorphan  Syrup 10 milliLiter(s) Oral every 6 hours  metoprolol tartrate 50 milliGRAM(s) Oral every 6 hours  sodium chloride 0.65% Nasal 1 Spray(s) Both Nostrils once      PHYSICAL EXAM:  T(C): 37.1 (03-21-21 @ 11:11), Max: 37.1 (03-21-21 @ 11:11)  HR: 73 (03-21-21 @ 11:11) (65 - 87)  BP: 127/78 (03-21-21 @ 11:11) (112/75 - 141/91)  RR: 17 (03-21-21 @ 11:11) (17 - 18)  SpO2: 90% (03-21-21 @ 11:11) (90% - 98%)  Wt(kg): --  I&O's Summary    20 Mar 2021 07:01  -  21 Mar 2021 07:00  --------------------------------------------------------  IN: 1380 mL / OUT: 1150 mL / NET: 230 mL          Appearance: NAD  HEENT:   Normal oral mucosa, PERRL, EOMI	  Lymphatic: No lymphadenopathy , no edema  Cardiovascular: Normal S1 S2, No JVD, No murmurs , Peripheral pulses palpable 2+ bilaterally  Respiratory: Lungs clear to auscultation, normal effort 	  Gastrointestinal:  Soft, Non-tender, + BS	  Skin: No rashes, No ecchymoses, No cyanosis, warm to touch  Musculoskeletal: Normal range of motion, normal strength  Psychiatry:  Mood & affect appropriate  Ext: No edema      LABS:    CARDIAC MARKERS:                                10.9   4.07  )-----------( 192      ( 20 Mar 2021 05:48 )             33.7     03-21    145  |  106  |  10  ----------------------------<  78  4.1   |  30  |  1.03    Ca    8.6      21 Mar 2021 06:47  Phos  3.1     03-20  Mg     1.9     03-21    TPro  5.8<L>  /  Alb  2.6<L>  /  TBili  0.3  /  DBili  x   /  AST  20  /  ALT  23  /  AlkPhos  43  03-20    proBNP:   Lipid Profile:   HgA1c:   TSH:             TELEMETRY: 	 SR    ECG:  	  RADIOLOGY:   DIAGNOSTIC TESTING:  [ ] Echocardiogram:  [ ]  Catheterization:  [ ] Stress Test:    OTHER:

## 2021-03-21 NOTE — PROGRESS NOTE ADULT - SUBJECTIVE AND OBJECTIVE BOX
Patient is a 65y old  Male who presents with a chief complaint of acute hypoxemic respiratory failure (21 Mar 2021 11:45)      SUBJECTIVE / OVERNIGHT EVENTS: pulse ox 93% on 6 L O2 NC, CPAP at hs    MEDICATIONS  (STANDING):  benzonatate 100 milliGRAM(s) Oral every 8 hours  enoxaparin Injectable 140 milliGRAM(s) SubCutaneous every 12 hours  guaifenesin/dextromethorphan  Syrup 10 milliLiter(s) Oral every 6 hours  metoprolol tartrate 50 milliGRAM(s) Oral every 6 hours  sodium chloride 0.65% Nasal 1 Spray(s) Both Nostrils once    MEDICATIONS  (PRN):  acetaminophen   Tablet .. 650 milliGRAM(s) Oral every 6 hours PRN Mild Pain (1 - 3)      Vital Signs Last 24 Hrs  T(F): 98.5 (03-21-21 @ 22:10), Max: 98.8 (03-21-21 @ 11:11)  HR: 60 (03-21-21 @ 22:10) (60 - 79)  BP: 122/83 (03-21-21 @ 22:10) (110/71 - 141/91)  RR: 17 (03-21-21 @ 22:10) (16 - 18)  SpO2: 93% (03-21-21 @ 22:10) (90% - 98%)  Telemetry:   CAPILLARY BLOOD GLUCOSE        I&O's Summary    20 Mar 2021 07:01  -  21 Mar 2021 07:00  --------------------------------------------------------  IN: 1380 mL / OUT: 1150 mL / NET: 230 mL    21 Mar 2021 07:01  -  21 Mar 2021 22:15  --------------------------------------------------------  IN: 1080 mL / OUT: 550 mL / NET: 530 mL        PHYSICAL EXAM:  GENERAL: NAD, well-developed  HEAD:  Atraumatic, Normocephalic  EYES: EOMI, PERRLA, conjunctiva and sclera clear  NECK: Supple, No JVD  CHEST/LUNG: Clear to auscultation bilaterally; No wheeze  HEART: Regular rate and rhythm; No murmurs, rubs, or gallops  ABDOMEN: Soft, Nontender, Nondistended; Bowel sounds present  EXTREMITIES:  2+ Peripheral Pulses, No clubbing, cyanosis, or edema  PSYCH: AAOx3  NEUROLOGY: non-focal  SKIN: No rashes or lesions    LABS:                        10.9   4.07  )-----------( 192      ( 20 Mar 2021 05:48 )             33.7     03-21    145  |  106  |  10  ----------------------------<  78  4.1   |  30  |  1.03    Ca    8.6      21 Mar 2021 06:47  Phos  3.1     03-20  Mg     1.9     03-21    TPro  5.8<L>  /  Alb  2.6<L>  /  TBili  0.3  /  DBili  x   /  AST  20  /  ALT  23  /  AlkPhos  43  03-20              RADIOLOGY & ADDITIONAL TESTS:    Imaging Personally Reviewed:    Consultant(s) Notes Reviewed:      Care Discussed with Consultants/Other Providers:

## 2021-03-22 LAB
ALBUMIN SERPL ELPH-MCNC: 2.7 G/DL — LOW (ref 3.3–5)
ALP SERPL-CCNC: 45 U/L — SIGNIFICANT CHANGE UP (ref 40–120)
ALT FLD-CCNC: 22 U/L — SIGNIFICANT CHANGE UP (ref 10–45)
ANION GAP SERPL CALC-SCNC: 11 MMOL/L — SIGNIFICANT CHANGE UP (ref 5–17)
AST SERPL-CCNC: 20 U/L — SIGNIFICANT CHANGE UP (ref 10–40)
BILIRUB SERPL-MCNC: 0.3 MG/DL — SIGNIFICANT CHANGE UP (ref 0.2–1.2)
BUN SERPL-MCNC: 10 MG/DL — SIGNIFICANT CHANGE UP (ref 7–23)
CALCIUM SERPL-MCNC: 8.6 MG/DL — SIGNIFICANT CHANGE UP (ref 8.4–10.5)
CHLORIDE SERPL-SCNC: 104 MMOL/L — SIGNIFICANT CHANGE UP (ref 96–108)
CO2 SERPL-SCNC: 27 MMOL/L — SIGNIFICANT CHANGE UP (ref 22–31)
CREAT SERPL-MCNC: 1.01 MG/DL — SIGNIFICANT CHANGE UP (ref 0.5–1.3)
GLUCOSE SERPL-MCNC: 77 MG/DL — SIGNIFICANT CHANGE UP (ref 70–99)
HCT VFR BLD CALC: 33.9 % — LOW (ref 39–50)
HGB BLD-MCNC: 10.7 G/DL — LOW (ref 13–17)
MAGNESIUM SERPL-MCNC: 1.9 MG/DL — SIGNIFICANT CHANGE UP (ref 1.6–2.6)
MCHC RBC-ENTMCNC: 29.2 PG — SIGNIFICANT CHANGE UP (ref 27–34)
MCHC RBC-ENTMCNC: 31.6 GM/DL — LOW (ref 32–36)
MCV RBC AUTO: 92.4 FL — SIGNIFICANT CHANGE UP (ref 80–100)
NRBC # BLD: 0 /100 WBCS — SIGNIFICANT CHANGE UP (ref 0–0)
PHOSPHATE SERPL-MCNC: 3.4 MG/DL — SIGNIFICANT CHANGE UP (ref 2.5–4.5)
PLATELET # BLD AUTO: 183 K/UL — SIGNIFICANT CHANGE UP (ref 150–400)
POTASSIUM SERPL-MCNC: 3.6 MMOL/L — SIGNIFICANT CHANGE UP (ref 3.5–5.3)
POTASSIUM SERPL-SCNC: 3.6 MMOL/L — SIGNIFICANT CHANGE UP (ref 3.5–5.3)
PROT SERPL-MCNC: 6.1 G/DL — SIGNIFICANT CHANGE UP (ref 6–8.3)
RBC # BLD: 3.67 M/UL — LOW (ref 4.2–5.8)
RBC # FLD: 13.4 % — SIGNIFICANT CHANGE UP (ref 10.3–14.5)
SODIUM SERPL-SCNC: 142 MMOL/L — SIGNIFICANT CHANGE UP (ref 135–145)
WBC # BLD: 3.57 K/UL — LOW (ref 3.8–10.5)
WBC # FLD AUTO: 3.57 K/UL — LOW (ref 3.8–10.5)

## 2021-03-22 PROCEDURE — 71250 CT THORAX DX C-: CPT | Mod: 26

## 2021-03-22 RX ORDER — ENOXAPARIN SODIUM 100 MG/ML
70 INJECTION SUBCUTANEOUS EVERY 12 HOURS
Refills: 0 | Status: DISCONTINUED | OUTPATIENT
Start: 2021-03-22 | End: 2021-03-22

## 2021-03-22 RX ORDER — APIXABAN 2.5 MG/1
5 TABLET, FILM COATED ORAL EVERY 12 HOURS
Refills: 0 | Status: DISCONTINUED | OUTPATIENT
Start: 2021-03-22 | End: 2021-04-05

## 2021-03-22 RX ADMIN — Medication 1 SPRAY(S): at 21:23

## 2021-03-22 RX ADMIN — Medication 10 MILLILITER(S): at 00:16

## 2021-03-22 RX ADMIN — ENOXAPARIN SODIUM 140 MILLIGRAM(S): 100 INJECTION SUBCUTANEOUS at 05:44

## 2021-03-22 RX ADMIN — Medication 50 MILLIGRAM(S): at 05:44

## 2021-03-22 RX ADMIN — Medication 100 MILLIGRAM(S): at 21:23

## 2021-03-22 RX ADMIN — APIXABAN 5 MILLIGRAM(S): 2.5 TABLET, FILM COATED ORAL at 17:32

## 2021-03-22 RX ADMIN — Medication 100 MILLIGRAM(S): at 05:44

## 2021-03-22 RX ADMIN — Medication 50 MILLIGRAM(S): at 00:17

## 2021-03-22 RX ADMIN — Medication 50 MILLIGRAM(S): at 23:18

## 2021-03-22 RX ADMIN — Medication 50 MILLIGRAM(S): at 17:32

## 2021-03-22 RX ADMIN — Medication 100 MILLIGRAM(S): at 14:55

## 2021-03-22 RX ADMIN — Medication 10 MILLILITER(S): at 23:18

## 2021-03-22 RX ADMIN — Medication 10 MILLILITER(S): at 11:23

## 2021-03-22 RX ADMIN — Medication 10 MILLILITER(S): at 05:44

## 2021-03-22 RX ADMIN — Medication 10 MILLILITER(S): at 17:32

## 2021-03-22 RX ADMIN — Medication 50 MILLIGRAM(S): at 11:23

## 2021-03-22 NOTE — PROGRESS NOTE ADULT - NSHPATTENDINGPLANDISCUSS_GEN_ALL_CORE
ptn, ID, pulm, card

## 2021-03-22 NOTE — PROGRESS NOTE ADULT - SUBJECTIVE AND OBJECTIVE BOX
Subjective: Patient seen and examined. No new events except as noted.   CPAP overnight     REVIEW OF SYSTEMS:    CONSTITUTIONAL: + weakness, fevers or chills  EYES/ENT: No visual changes;  No vertigo or throat pain   NECK: No pain or stiffness  RESPIRATORY: No cough, wheezing, hemoptysis; No shortness of breath  CARDIOVASCULAR: No chest pain or palpitations  GASTROINTESTINAL: No abdominal or epigastric pain. No nausea, vomiting, or hematemesis; No diarrhea or constipation. No melena or hematochezia.  GENITOURINARY: No dysuria, frequency or hematuria  NEUROLOGICAL: No numbness or weakness  SKIN: No itching, burning, rashes, or lesions   All other review of systems is negative unless indicated above.    MEDICATIONS:  MEDICATIONS  (STANDING):  benzonatate 100 milliGRAM(s) Oral every 8 hours  enoxaparin Injectable 140 milliGRAM(s) SubCutaneous every 12 hours  guaifenesin/dextromethorphan  Syrup 10 milliLiter(s) Oral every 6 hours  metoprolol tartrate 50 milliGRAM(s) Oral every 6 hours  sodium chloride 0.65% Nasal 1 Spray(s) Both Nostrils once      PHYSICAL EXAM:  T(C): 36.9 (03-22-21 @ 04:23), Max: 36.9 (03-21-21 @ 22:10)  HR: 74 (03-22-21 @ 09:10) (60 - 79)  BP: 136/83 (03-22-21 @ 04:23) (110/71 - 136/83)  RR: 18 (03-22-21 @ 07:46) (16 - 18)  SpO2: 89% (03-22-21 @ 09:10) (89% - 99%)  Wt(kg): --  I&O's Summary    21 Mar 2021 07:01  -  22 Mar 2021 07:00  --------------------------------------------------------  IN: 1080 mL / OUT: 1250 mL / NET: -170 mL    22 Mar 2021 07:01  -  22 Mar 2021 11:37  --------------------------------------------------------  IN: 240 mL / OUT: 0 mL / NET: 240 mL          Appearance: NAD   HEENT:   Normal oral mucosa, PERRL, EOMI	  Lymphatic: No lymphadenopathy , no edema  Cardiovascular: Normal S1 S2, No JVD, No murmurs , Peripheral pulses palpable 2+ bilaterally  Respiratory: Lungs clear to auscultation, normal effort 	  Gastrointestinal:  Soft, Non-tender, + BS	  Skin: No rashes, No ecchymoses, No cyanosis, warm to touch  Musculoskeletal: Normal range of motion, normal strength  Psychiatry:  Mood & affect appropriate  Ext: No edema      LABS:    CARDIAC MARKERS:                                10.7   3.57  )-----------( 183      ( 22 Mar 2021 06:36 )             33.9     03-22    142  |  104  |  10  ----------------------------<  77  3.6   |  27  |  1.01    Ca    8.6      22 Mar 2021 06:34  Phos  3.4     03-22  Mg     1.9     03-22    TPro  6.1  /  Alb  2.7<L>  /  TBili  0.3  /  DBili  x   /  AST  20  /  ALT  22  /  AlkPhos  45  03-22    proBNP:   Lipid Profile:   HgA1c:   TSH:             TELEMETRY: 	  SR   ECG:  	  RADIOLOGY:   DIAGNOSTIC TESTING:  [ ] Echocardiogram:  [ ]  Catheterization:  [ ] Stress Test:    OTHER:

## 2021-03-22 NOTE — PROGRESS NOTE ADULT - ASSESSMENT
The patient is a 65y Male, hx of morbid obesity, HTN on lisinopril, complaining of shortness of breath. covid + dx 5d prior. no home O2 requirement, former smoker. has dyspnea at rest and on exertion. has not taken steroids, no hospitalization for covid. denies CP. [facility] (27 Feb 2021 22:29)    ER vital:  T 98.4, P 178, /85.  Pt satting 85% on RA, placed on nasal cannula, advanced to NRB.  Covid pcr (+), covid serologies (-).   CT angio chest (-) for PE, (+) b/l patchy opacities.  Pt with elevated AST (>5x ULN but <10x) and ALT.   Pt started on remdesivir and dexamethasone.     Acute covid illness:    - Cont remdesivir and dexamethasone.  Monitor daily LFTs and renal function while pt on remdesivir.  LFTs elevated, possibly secondary to viral infection.  Currently ALT < 10x ULN, can cont remdesivir.  LFTs slowly improving.     - Monitor pulse ox, pt on supplemental O2 - now on Bipap    - Monitor inflammatory markers q72    DD:  192 <-- 272 <-- 359 <-- 248 <-- 213 <-- 282 <-- 343  ferritin: 666 <-- 692 <-- 1468 <-- 1636  pct:  0.58  crp: 1.28 <-- 3.0 <-- 2.2 <-- 10.3 <-- 10.9    - Pt on full dose lovenox for dvt/pe.  CT angio chest (-) for PE.   LE dopplers neg DVT    *  Repeat cxr 3/8 with continued b/l patchy opacities with slight worsening.  No indication for abx at this time.  Pt on VM mask.  Repeat covid swab sent 3/16 positive.      * Oxygenation improving, now on 4L nc.      Wandy Luke  [phone]

## 2021-03-22 NOTE — PROGRESS NOTE ADULT - SUBJECTIVE AND OBJECTIVE BOX
Follow-up Pulm Progress Note  Francisco Ramon MD  [phone]    Nasal cannula tapered to 4 liters today  Comfortable OOB in chair  D-dimer normal    Vital Signs Last 24 Hrs  T(C): 37.4 (22 Mar 2021 11:39), Max: 37.4 (22 Mar 2021 11:39)  T(F): 99.3 (22 Mar 2021 11:39), Max: 99.3 (22 Mar 2021 11:39)  HR: 75 (22 Mar 2021 11:45) (60 - 79)  BP: 123/82 (22 Mar 2021 11:39) (110/71 - 136/83)  BP(mean): --  RR: 20 (22 Mar 2021 11:45) (16 - 20)  SpO2: 94% (22 Mar 2021 11:45) (89% - 99%)                        10.7   3.57  )-----------( 183      ( 22 Mar 2021 06:36 )             33.9       03-22    142  |  104  |  10  ----------------------------<  77  3.6   |  27  |  1.01    Ca    8.6      22 Mar 2021 06:34  Phos  3.4     03-22  Mg     1.9     03-22    TPro  6.1  /  Alb  2.7<L>  /  TBili  0.3  /  DBili  x   /  AST  20  /  ALT  22  /  AlkPhos  45  03-22    CULTURES:  Culture Results:   No growth to date. (02-27 @ 18:02)  Culture Results:   No growth to date. (02-27 @ 18:02)    Most recent blood culture -- 02-27 @ 18:02   -- -- .Blood Blood 02-27 @ 18:02      Physical Examination:  PULM: No wheeze  CVS: Regular rate and rhythm, no murmurs, rubs, or gallops  ABD: Soft, non-tender  EXT:  No clubbing, cyanosis, or edema    RADIOLOGY REVIEWED  CXR:    CT chest:    PROCEDURE DATE:  02/27/2021        INTERPRETATION:  CLINICAL INFORMATION: Hypoxia, Covid 19 infection, assess for pulmonary embolism    COMPARISON: Chest radiograph from same day    PROCEDURE:  CT Angiography of the Chest.  90 ml of Omnipaque 350 was injected intravenously. 10 ml were discarded.  Sagittal and coronal reformats were performed as well as 3D (MIP) reconstructions.    FINDINGS:    LUNGS AND AIRWAYS: Patent central airways.  Bilateral patchy groundglass and consolidative opacities with peripheral and lower lobe predominance.  PLEURA: No pleural effusion.  MEDIASTINUM AND KASIE: No lymphadenopathy.  VESSELS: No central, main, lobar, or segmental pulmonary embolism. Evaluation of the subsegmental pulmonary arteries is limited/nondiagnostic secondary to respiratory motion.  HEART: Heart size is normal. No pericardial effusion.  CHEST WALL AND LOWER NECK: Prominent right upper paratracheal lymph node may be reactive  VISUALIZED UPPER ABDOMEN: Small hiatal hernia.  BONES: Spinal degenerative changes.    IMPRESSION:  No central, main, lobar, or segmental pulmonary embolism. Evaluation of the subsegmental pulmonary arteries is limited/nondiagnostic secondary to respiratory motion.    Bilateral patchy groundglass and consolidative opacities with peripheral and lower lobe predominance, compatible with patient's known Covid 19 infection. Follow-up to resolution recommended.    TTE:

## 2021-03-22 NOTE — PROGRESS NOTE ADULT - ASSESSMENT
ACute hypoxemic respiratory failure  Obesity  Multilobar viral pneumonia due to COVID 19  Atrial Fibrillation: now back in NSR  Refractory hypoxemia: multifactorial - residual COVID, obesity with basilar atelectasis  CTA 2/27 negative PE and LE dopplers negative 3/3    REC    Continue nocturnal CPAP 8 and prn day if sleeping  Taper nasal oxygen: target sat 88-90%  Consider reduction Lovenox to "mid dose" eg 70 q 12  CT nc chest ordered

## 2021-03-22 NOTE — PROGRESS NOTE ADULT - MINUTES
120
35
120
35
35
120
35
35
120
35
120
35
120
120
35
120
35
120
35
120

## 2021-03-22 NOTE — PROGRESS NOTE ADULT - ATTENDING COMMENTS
Advanced care planning was discussed with patient and family.  Advanced care planning forms were reviewed and discussed as appropriate.  Differential diagnosis and plan of care discussed with patient after the evaluation.   Pain assessed and judicious use of narcotics when appropriate was discussed.  Importance of Fall prevention discussed.  Counseling on Smoking and Alcohol cessation was offered when appropriate.  Counseling on Diet, exercise, and medication compliance was done.
Given pneumonia, we will try to avoid excessive drug interactions, so for now, will continue with diltiazem.
Advanced care planning was discussed with patient and family.  Advanced care planning forms were reviewed and discussed as appropriate.  Differential diagnosis and plan of care discussed with patient after the evaluation.   Pain assessed and judicious use of narcotics when appropriate was discussed.  Importance of Fall prevention discussed.  Counseling on Smoking and Alcohol cessation was offered when appropriate.  Counseling on Diet, exercise, and medication compliance was done.

## 2021-03-22 NOTE — PROGRESS NOTE ADULT - SUBJECTIVE AND OBJECTIVE BOX
Patient is a 65y old  Male who presents with a chief complaint of acute hypoxemic respiratory failure (22 Mar 2021 20:45)      SUBJECTIVE / OVERNIGHT EVENTS: cytokine storm resolved, switch AC from Lovenox to Eliquis today. on [id number]    MEDICATIONS  (STANDING):  apixaban 5 milliGRAM(s) Oral every 12 hours  benzonatate 100 milliGRAM(s) Oral every 8 hours  guaifenesin/dextromethorphan  Syrup 10 milliLiter(s) Oral every 6 hours  metoprolol tartrate 50 milliGRAM(s) Oral every 6 hours  sodium chloride 0.65% Nasal 1 Spray(s) Both Nostrils once    MEDICATIONS  (PRN):  acetaminophen   Tablet .. 650 milliGRAM(s) Oral every 6 hours PRN Mild Pain (1 - 3)      Vital Signs Last 24 Hrs  T(F): 98.8 (03-22-21 @ 20:38), Max: 99.3 (03-22-21 @ 11:39)  HR: 68 (03-22-21 @ 20:38) (60 - 80)  BP: 124/81 (03-22-21 @ 20:38) (122/83 - 136/83)  RR: 19 (03-22-21 @ 20:38) (17 - 20)  SpO2: 96% (03-22-21 @ 20:38) (89% - 99%)  Telemetry:   CAPILLARY BLOOD GLUCOSE        I&O's Summary    21 Mar 2021 07:01  -  22 Mar 2021 07:00  --------------------------------------------------------  IN: 1080 mL / OUT: 1250 mL / NET: -170 mL    22 Mar 2021 07:01  -  22 Mar 2021 21:07  --------------------------------------------------------  IN: 900 mL / OUT: 0 mL / NET: 900 mL        PHYSICAL EXAM:  GENERAL: NAD, well-developed  HEAD:  Atraumatic, Normocephalic  EYES: EOMI, PERRLA, conjunctiva and sclera clear  NECK: Supple, No JVD  CHEST/LUNG: Clear to auscultation bilaterally; No wheeze  HEART: Regular rate and rhythm; No murmurs, rubs, or gallops  ABDOMEN: Soft, Nontender, Nondistended; Bowel sounds present  EXTREMITIES:  2+ Peripheral Pulses, No clubbing, cyanosis, or edema  PSYCH: AAOx3  NEUROLOGY: non-focal  SKIN: No rashes or lesions    LABS:                        10.7   3.57  )-----------( 183      ( 22 Mar 2021 06:36 )             33.9     03-22    142  |  104  |  10  ----------------------------<  77  3.6   |  27  |  1.01    Ca    8.6      22 Mar 2021 06:34  Phos  3.4     03-22  Mg     1.9     03-22    TPro  6.1  /  Alb  2.7<L>  /  TBili  0.3  /  DBili  x   /  AST  20  /  ALT  22  /  AlkPhos  45  03-22              RADIOLOGY & ADDITIONAL TESTS:    Imaging Personally Reviewed:    Consultant(s) Notes Reviewed:      Care Discussed with Consultants/Other Providers:

## 2021-03-22 NOTE — PROGRESS NOTE ADULT - SUBJECTIVE AND OBJECTIVE BOX
Infectious Diseases progress note:    Subjective:  Pt seen earlier today.  NAD, feels well.   Satting adequately on 4L nasal cannula    ROS:  CONSTITUTIONAL:  No fever, chills, rigors  CARDIOVASCULAR:  No chest pain or palpitations  RESPIRATORY:   No SOB, cough, dyspnea on exertion.  No wheezing  GASTROINTESTINAL:  No abd pain, N/V, diarrhea/constipation  EXTREMITIES:  No swelling or joint pain  GENITOURINARY:  No burning on urination, increased frequency or urgency.  No flank pain  NEUROLOGIC:  No HA, visual disturbances  SKIN: No rashes    Allergies    Actifed (Headache)  Sudafed (Headache)    Intolerances        ANTIBIOTICS/RELEVANT:  antimicrobials    immunologic:    OTHER:  acetaminophen   Tablet .. 650 milliGRAM(s) Oral every 6 hours PRN  apixaban 5 milliGRAM(s) Oral every 12 hours  benzonatate 100 milliGRAM(s) Oral every 8 hours  guaifenesin/dextromethorphan  Syrup 10 milliLiter(s) Oral every 6 hours  metoprolol tartrate 50 milliGRAM(s) Oral every 6 hours  sodium chloride 0.65% Nasal 1 Spray(s) Both Nostrils once      Objective:  Vital Signs Last 24 Hrs  T(C): 37.4 (22 Mar 2021 11:39), Max: 37.4 (22 Mar 2021 11:39)  T(F): 99.3 (22 Mar 2021 11:39), Max: 99.3 (22 Mar 2021 11:39)  HR: 80 (22 Mar 2021 15:15) (60 - 80)  BP: 123/82 (22 Mar 2021 11:39) (122/83 - 136/83)  BP(mean): --  RR: 20 (22 Mar 2021 11:45) (17 - 20)  SpO2: 93% (22 Mar 2021 15:15) (89% - 99%)    PHYSICAL EXAM:  Constitutional:NAD  Eyes:KRISTEN, EOMI  Ear/Nose/Throat: no thrush, mucositis.  Moist mucous membranes	  Neck:no JVD, no lymphadenopathy, supple  Respiratory: CTA berna  Cardiovascular: S1S2 RRR, no murmurs  Gastrointestinal:soft, nontender,  nondistended (+) BS  Extremities:no e/e/c  Skin:  no rashes, open wounds or ulcerations        LABS:                        10.7   3.57  )-----------( 183      ( 22 Mar 2021 06:36 )             33.9     03-22    142  |  104  |  10  ----------------------------<  77  3.6   |  27  |  1.01    Ca    8.6      22 Mar 2021 06:34  Phos  3.4     03-22  Mg     1.9     03-22    TPro  6.1  /  Alb  2.7<L>  /  TBili  0.3  /  DBili  x   /  AST  20  /  ALT  22  /  AlkPhos  45  03-22          Procalcitonin, Serum: 0.58 (02-27 @ 13:25)                    MICROBIOLOGY:          RADIOLOGY & ADDITIONAL STUDIES:

## 2021-03-23 LAB
ALBUMIN SERPL ELPH-MCNC: 2.7 G/DL — LOW (ref 3.3–5)
ALP SERPL-CCNC: 47 U/L — SIGNIFICANT CHANGE UP (ref 40–120)
ALT FLD-CCNC: 25 U/L — SIGNIFICANT CHANGE UP (ref 10–45)
ANION GAP SERPL CALC-SCNC: 13 MMOL/L — SIGNIFICANT CHANGE UP (ref 5–17)
AST SERPL-CCNC: 22 U/L — SIGNIFICANT CHANGE UP (ref 10–40)
BILIRUB DIRECT SERPL-MCNC: <0.1 MG/DL — SIGNIFICANT CHANGE UP (ref 0–0.2)
BILIRUB INDIRECT FLD-MCNC: >0.2 MG/DL — SIGNIFICANT CHANGE UP (ref 0.2–1)
BILIRUB SERPL-MCNC: 0.3 MG/DL — SIGNIFICANT CHANGE UP (ref 0.2–1.2)
BUN SERPL-MCNC: 8 MG/DL — SIGNIFICANT CHANGE UP (ref 7–23)
CALCIUM SERPL-MCNC: 8.6 MG/DL — SIGNIFICANT CHANGE UP (ref 8.4–10.5)
CHLORIDE SERPL-SCNC: 103 MMOL/L — SIGNIFICANT CHANGE UP (ref 96–108)
CO2 SERPL-SCNC: 26 MMOL/L — SIGNIFICANT CHANGE UP (ref 22–31)
CREAT SERPL-MCNC: 0.89 MG/DL — SIGNIFICANT CHANGE UP (ref 0.5–1.3)
CRP SERPL-MCNC: 0.66 MG/DL — HIGH (ref 0–0.4)
D DIMER BLD IA.RAPID-MCNC: 155 NG/ML DDU — SIGNIFICANT CHANGE UP
FERRITIN SERPL-MCNC: 416 NG/ML — HIGH (ref 30–400)
GLUCOSE SERPL-MCNC: 69 MG/DL — LOW (ref 70–99)
HCT VFR BLD CALC: 35.2 % — LOW (ref 39–50)
HGB BLD-MCNC: 11.3 G/DL — LOW (ref 13–17)
MCHC RBC-ENTMCNC: 29.7 PG — SIGNIFICANT CHANGE UP (ref 27–34)
MCHC RBC-ENTMCNC: 32.1 GM/DL — SIGNIFICANT CHANGE UP (ref 32–36)
MCV RBC AUTO: 92.4 FL — SIGNIFICANT CHANGE UP (ref 80–100)
NRBC # BLD: 0 /100 WBCS — SIGNIFICANT CHANGE UP (ref 0–0)
PLATELET # BLD AUTO: 113 K/UL — LOW (ref 150–400)
POTASSIUM SERPL-MCNC: 3.8 MMOL/L — SIGNIFICANT CHANGE UP (ref 3.5–5.3)
POTASSIUM SERPL-SCNC: 3.8 MMOL/L — SIGNIFICANT CHANGE UP (ref 3.5–5.3)
PROT SERPL-MCNC: 6.1 G/DL — SIGNIFICANT CHANGE UP (ref 6–8.3)
RBC # BLD: 3.81 M/UL — LOW (ref 4.2–5.8)
RBC # FLD: 13.7 % — SIGNIFICANT CHANGE UP (ref 10.3–14.5)
SODIUM SERPL-SCNC: 142 MMOL/L — SIGNIFICANT CHANGE UP (ref 135–145)
WBC # BLD: 3.92 K/UL — SIGNIFICANT CHANGE UP (ref 3.8–10.5)
WBC # FLD AUTO: 3.92 K/UL — SIGNIFICANT CHANGE UP (ref 3.8–10.5)

## 2021-03-23 RX ADMIN — Medication 10 MILLILITER(S): at 17:23

## 2021-03-23 RX ADMIN — Medication 50 MILLIGRAM(S): at 11:28

## 2021-03-23 RX ADMIN — Medication 50 MILLIGRAM(S): at 23:11

## 2021-03-23 RX ADMIN — Medication 50 MILLIGRAM(S): at 05:23

## 2021-03-23 RX ADMIN — Medication 10 MILLILITER(S): at 05:22

## 2021-03-23 RX ADMIN — APIXABAN 5 MILLIGRAM(S): 2.5 TABLET, FILM COATED ORAL at 17:23

## 2021-03-23 RX ADMIN — Medication 50 MILLIGRAM(S): at 17:23

## 2021-03-23 RX ADMIN — Medication 10 MILLILITER(S): at 23:11

## 2021-03-23 RX ADMIN — Medication 100 MILLIGRAM(S): at 13:32

## 2021-03-23 RX ADMIN — Medication 100 MILLIGRAM(S): at 21:10

## 2021-03-23 RX ADMIN — Medication 10 MILLILITER(S): at 11:28

## 2021-03-23 RX ADMIN — Medication 100 MILLIGRAM(S): at 05:23

## 2021-03-23 RX ADMIN — APIXABAN 5 MILLIGRAM(S): 2.5 TABLET, FILM COATED ORAL at 05:23

## 2021-03-23 NOTE — PROGRESS NOTE ADULT - SUBJECTIVE AND OBJECTIVE BOX
Infectious Diseases progress note:    Subjective: NAD, afebrile.  Satting adequately on 4L nc    ROS:  CONSTITUTIONAL:  No fever, chills, rigors  CARDIOVASCULAR:  No chest pain or palpitations  RESPIRATORY:   No SOB, cough, dyspnea on exertion.  No wheezing  GASTROINTESTINAL:  No abd pain, N/V, diarrhea/constipation  EXTREMITIES:  No swelling or joint pain  GENITOURINARY:  No burning on urination, increased frequency or urgency.  No flank pain  NEUROLOGIC:  No HA, visual disturbances  SKIN: No rashes    Allergies    Actifed (Headache)  Sudafed (Headache)    Intolerances        ANTIBIOTICS/RELEVANT:  antimicrobials    immunologic:    OTHER:  acetaminophen   Tablet .. 650 milliGRAM(s) Oral every 6 hours PRN  apixaban 5 milliGRAM(s) Oral every 12 hours  benzonatate 100 milliGRAM(s) Oral every 8 hours  guaifenesin/dextromethorphan  Syrup 10 milliLiter(s) Oral every 6 hours  metoprolol tartrate 50 milliGRAM(s) Oral every 6 hours      Objective:  Vital Signs Last 24 Hrs  T(C): 36.9 (23 Mar 2021 11:39), Max: 37.1 (22 Mar 2021 20:38)  T(F): 98.4 (23 Mar 2021 11:39), Max: 98.8 (22 Mar 2021 20:38)  HR: 75 (23 Mar 2021 11:39) (60 - 83)  BP: 123/79 (23 Mar 2021 11:39) (123/79 - 132/83)  BP(mean): --  RR: 20 (23 Mar 2021 11:39) (18 - 20)  SpO2: 91% (23 Mar 2021 11:39) (91% - 96%)    PHYSICAL EXAM:  Constitutional:NAD  Eyes:KRISTEN, EOMI  Ear/Nose/Throat: no thrush, mucositis.  Moist mucous membranes	  Neck:no JVD, no lymphadenopathy, supple  Respiratory: CTA berna  Cardiovascular: S1S2 RRR, no murmurs  Gastrointestinal:soft, nontender,  nondistended (+) BS  Extremities:no e/e/c  Skin:  no rashes, open wounds or ulcerations        LABS:                        11.3   3.92  )-----------( 113      ( 23 Mar 2021 07:16 )             35.2     03-23    142  |  103  |  8   ----------------------------<  69<L>  3.8   |  26  |  0.89    Ca    8.6      23 Mar 2021 07:13  Phos  3.4     03-22  Mg     1.9     03-22    TPro  6.1  /  Alb  2.7<L>  /  TBili  0.3  /  DBili  <0.1  /  AST  22  /  ALT  25  /  AlkPhos  47  03-23          Procalcitonin, Serum: 0.58 (02-27 @ 13:25)        MICROBIOLOGY:      Culture - Blood (02.27.21 @ 18:02)   Specimen Source: .Blood Blood   Culture Results:   No Growth Final     RADIOLOGY & ADDITIONAL STUDIES:    < from: CT Chest No Cont (03.22.21 @ 21:10) >  IMPRESSION: Patchy opacities are noted within both lungs consistent with the patient's known history of covid pneumonia. Some of the opacities have decreased while several new opacities are noted within both lungs.    < end of copied text >

## 2021-03-23 NOTE — PROGRESS NOTE ADULT - SUBJECTIVE AND OBJECTIVE BOX
Follow-up Pulm Progress Note  Francisco Ramon MD  [phone]    Tolerating nasal cannula at 4 liters  CT Chest with patchy multi-lobar opacities c/w COVID pneumonia  Stable resp status      Vital Signs Last 24 Hrs  T(C): 36.9 (23 Mar 2021 11:39), Max: 37.1 (22 Mar 2021 20:38)  T(F): 98.4 (23 Mar 2021 11:39), Max: 98.8 (22 Mar 2021 20:38)  HR: 75 (23 Mar 2021 11:39) (60 - 83)  BP: 123/79 (23 Mar 2021 11:39) (123/79 - 132/83)  BP(mean): --  RR: 20 (23 Mar 2021 11:39) (18 - 20)  SpO2: 91% (23 Mar 2021 11:39) (91% - 96%)                              11.3   3.92  )-----------( 113      ( 23 Mar 2021 07:16 )             35.2       03-23    142  |  103  |  8   ----------------------------<  69<L>  3.8   |  26  |  0.89    Ca    8.6      23 Mar 2021 07:13  Phos  3.4     03-22  Mg     1.9     03-22    TPro  6.1  /  Alb  2.7<L>  /  TBili  0.3  /  DBili  <0.1  /  AST  22  /  ALT  25  /  AlkPhos  47  03-23    Physical Examination:  PULM: No wheeze  CVS: Regular rate and rhythm, no murmurs, rubs, or gallops  ABD: Soft, non-tender  EXT:  No clubbing, cyanosis, or edema    RADIOLOGY REVIEWED  CXR:    CT chest:    PROCEDURE DATE:  02/27/2021        INTERPRETATION:  CLINICAL INFORMATION: Hypoxia, Covid 19 infection, assess for pulmonary embolism    COMPARISON: Chest radiograph from same day    PROCEDURE:  CT Angiography of the Chest.  90 ml of Omnipaque 350 was injected intravenously. 10 ml were discarded.  Sagittal and coronal reformats were performed as well as 3D (MIP) reconstructions.    FINDINGS:    LUNGS AND AIRWAYS: Patent central airways.  Bilateral patchy groundglass and consolidative opacities with peripheral and lower lobe predominance.  PLEURA: No pleural effusion.  MEDIASTINUM AND KASIE: No lymphadenopathy.  VESSELS: No central, main, lobar, or segmental pulmonary embolism. Evaluation of the subsegmental pulmonary arteries is limited/nondiagnostic secondary to respiratory motion.  HEART: Heart size is normal. No pericardial effusion.  CHEST WALL AND LOWER NECK: Prominent right upper paratracheal lymph node may be reactive  VISUALIZED UPPER ABDOMEN: Small hiatal hernia.  BONES: Spinal degenerative changes.    IMPRESSION:  No central, main, lobar, or segmental pulmonary embolism. Evaluation of the subsegmental pulmonary arteries is limited/nondiagnostic secondary to respiratory motion.    Bilateral patchy groundglass and consolidative opacities with peripheral and lower lobe predominance, compatible with patient's known Covid 19 infection. Follow-up to resolution recommended.    TTE:

## 2021-03-23 NOTE — PROVIDER CONTACT NOTE (OTHER) - SITUATION
Pt HR 150s-190s upon ambulation to bathroom around 1am.  Pt given time to rest, -150s at rest and trending up as per tele. PA notified.
Patient diagnosed with Covid >21 days ago. Asymptomatic. No further isolation required.
Pt nioted with 3, 8 then 4 beats wct on tele
Pt noted on tele with HR sustaining in the 140's
Afib HR 150s
Patient back into Afib.
pt convert from A-fib to SR.
Pt alternating between NS 60-90 and afib 100-120s
Pt converted from NSR to A-fib on tele
Pt refusing CPAP machine for night-time
as per Pulmonologist patient should not be on non rebreather,RN can try High flow nasal cannula
pt converted from afib to sinus on tele

## 2021-03-23 NOTE — PROVIDER CONTACT NOTE (OTHER) - REASON
Pt alternating between NS 60-90 and afib 100-120s
Afib HR 150s
Discontinuation of Isolation for COVID patient
-150
Pt. on Tele, A Fib SC=149 & POX on 50% VM=88%.
Patient back into Afib
pt converted from afib to sinus on tele
Pt converted from NSR to A-fib on tele
Pt refusing CPAP machine for night-time
patient refusing to wear high flow nasal cannul;a
pt convert from A-fib to SR.
hr sustaining in the 140's
wct noted on tele

## 2021-03-23 NOTE — PROGRESS NOTE ADULT - ASSESSMENT
ACute hypoxemic respiratory failure  Obesity  Multilobar viral pneumonia due to COVID 19  Atrial Fibrillation: now back in NSR  Refractory hypoxemia: multifactorial - residual COVID, obesity with basilar atelectasis  CTA 2/27 negative PE and LE dopplers negative 3/3    REC    Continue nocturnal CPAP 8 and prn day if sleeping  Taper nasal oxygen: target sat 88-90%  AC for AF per primar team  DC planning with home O2

## 2021-03-23 NOTE — PROVIDER CONTACT NOTE (OTHER) - DATE AND TIME:
02-Mar-2021 05:04
05-Mar-2021 12:45
10-Mar-2021 19:28
23-Mar-2021 11:33
01-Mar-2021 21:50
12-Mar-2021 22:48
13-Mar-2021 02:38
28-Feb-2021 15:00
15-Mar-2021 01:30
10-Mar-2021 15:25
11-Mar-2021 02:03
01-Mar-2021 23:15
14-Mar-2021 17:00

## 2021-03-23 NOTE — PROGRESS NOTE ADULT - SUBJECTIVE AND OBJECTIVE BOX
Subjective: Patient seen and examined. No new events except as noted.   CPAP overnight   resting comfortably     REVIEW OF SYSTEMS:    CONSTITUTIONAL: =weakness, fevers or chills  EYES/ENT: No visual changes;  No vertigo or throat pain   NECK: No pain or stiffness  RESPIRATORY: No cough, wheezing, hemoptysis; No shortness of breath  CARDIOVASCULAR: No chest pain or palpitations  GASTROINTESTINAL: No abdominal or epigastric pain. No nausea, vomiting, or hematemesis; No diarrhea or constipation. No melena or hematochezia.  GENITOURINARY: No dysuria, frequency or hematuria  NEUROLOGICAL: No numbness or weakness  SKIN: No itching, burning, rashes, or lesions   All other review of systems is negative unless indicated above.    MEDICATIONS:  MEDICATIONS  (STANDING):  apixaban 5 milliGRAM(s) Oral every 12 hours  benzonatate 100 milliGRAM(s) Oral every 8 hours  guaifenesin/dextromethorphan  Syrup 10 milliLiter(s) Oral every 6 hours  metoprolol tartrate 50 milliGRAM(s) Oral every 6 hours      PHYSICAL EXAM:  T(C): 36.2 (03-23-21 @ 05:12), Max: 37.4 (03-22-21 @ 11:39)  HR: 77 (03-23-21 @ 09:10) (60 - 83)  BP: 128/82 (03-23-21 @ 05:12) (123/82 - 132/83)  RR: 18 (03-23-21 @ 07:15) (18 - 20)  SpO2: 92% (03-23-21 @ 09:10) (89% - 96%)  Wt(kg): --  I&O's Summary    22 Mar 2021 07:01  -  23 Mar 2021 07:00  --------------------------------------------------------  IN: 960 mL / OUT: 0 mL / NET: 960 mL          Appearance: NAD	  HEENT:   Normal oral mucosa, PERRL, EOMI	  Lymphatic: No lymphadenopathy , no edema  Cardiovascular: Normal S1 S2, No JVD, No murmurs , Peripheral pulses palpable 2+ bilaterally  Respiratory: Lungs clear to auscultation, normal effort 	  Gastrointestinal:  Soft, Non-tender, + BS	  Skin: No rashes, No ecchymoses, No cyanosis, warm to touch  Musculoskeletal: Normal range of motion, normal strength  Psychiatry:  Mood & affect appropriate  Ext: No edema      LABS:    CARDIAC MARKERS:                                11.3   3.92  )-----------( 113      ( 23 Mar 2021 07:16 )             35.2     03-23    142  |  103  |  8   ----------------------------<  69<L>  3.8   |  26  |  0.89    Ca    8.6      23 Mar 2021 07:13  Phos  3.4     03-22  Mg     1.9     03-22    TPro  6.1  /  Alb  2.7<L>  /  TBili  0.3  /  DBili  <0.1  /  AST  22  /  ALT  25  /  AlkPhos  47  03-23    proBNP:   Lipid Profile:   HgA1c:   TSH:             TELEMETRY: 	SR     ECG:  	  RADIOLOGY:   DIAGNOSTIC TESTING:  [ ] Echocardiogram:  [ ]  Catheterization:  [ ] Stress Test:    OTHER:

## 2021-03-23 NOTE — PROVIDER CONTACT NOTE (OTHER) - NAME OF MD/NP/PA/DO NOTIFIED:
Calista David, NP
FLORENCE Oneill
Hua BAUMANN
Judi BAUMANN
Judi BAUMANN
Unit leadership and bed board.
Arie,ANP
Judi BAUMANN
PASTOR Sheppard
PASTOR Sheppard
Beth HENRIQUEZ
PASTOR Luna
PASTOR Sequeira

## 2021-03-23 NOTE — PROGRESS NOTE ADULT - ASSESSMENT
The patient is a 65y Male, hx of morbid obesity, HTN on lisinopril, complaining of shortness of breath. covid + dx 5d prior. no home O2 requirement, former smoker. has dyspnea at rest and on exertion. has not taken steroids, no hospitalization for covid. denies CP. [facility] (27 Feb 2021 22:29)    ER vital:  T 98.4, P 178, /85.  Pt satting 85% on RA, placed on nasal cannula, advanced to NRB.  Covid pcr (+), covid serologies (-).   CT angio chest (-) for PE, (+) b/l patchy opacities.  Pt with elevated AST (>5x ULN but <10x) and ALT.   Pt started on remdesivir and dexamethasone.     Acute covid illness:    - Cont remdesivir and dexamethasone.  Monitor daily LFTs and renal function while pt on remdesivir.  LFTs elevated, possibly secondary to viral infection.  Currently ALT < 10x ULN, can cont remdesivir.  LFTs slowly improving.     - Monitor pulse ox, pt on supplemental O2 - now on Bipap    - Monitor inflammatory markers q72    DD:  192 <-- 272 <-- 359 <-- 248 <-- 213 <-- 282 <-- 343  ferritin: 666 <-- 692 <-- 1468 <-- 1636  pct:  0.58  crp: 1.28 <-- 3.0 <-- 2.2 <-- 10.3 <-- 10.9    - Pt on full dose lovenox for dvt/pe.  CT angio chest (-) for PE.   LE dopplers neg DVT    *  Repeat cxr 3/8 with continued b/l patchy opacities with slight worsening.  No indication for abx at this time.  Pt on VM mask.  Repeat covid swab sent 3/16 positive.      * Oxygenation improving, now on 4L nc.  Repeat cxr 3/22 with b/l patchy opacities c/w covid pna    Wandy Luke  [phone]

## 2021-03-23 NOTE — PROGRESS NOTE ADULT - SUBJECTIVE AND OBJECTIVE BOX
Patient is a 65y old  Male who presents with a chief complaint of acute hypoxemic respiratory failure (23 Mar 2021 11:05)      SUBJECTIVE / OVERNIGHT EVENTS: stable on 4 l O2NC    MEDICATIONS  (STANDING):  apixaban 5 milliGRAM(s) Oral every 12 hours  benzonatate 100 milliGRAM(s) Oral every 8 hours  guaifenesin/dextromethorphan  Syrup 10 milliLiter(s) Oral every 6 hours  metoprolol tartrate 50 milliGRAM(s) Oral every 6 hours    MEDICATIONS  (PRN):  acetaminophen   Tablet .. 650 milliGRAM(s) Oral every 6 hours PRN Mild Pain (1 - 3)      Vital Signs Last 24 Hrs  T(F): 98.4 (03-23-21 @ 11:39), Max: 98.8 (03-22-21 @ 20:38)  HR: 75 (03-23-21 @ 11:39) (60 - 83)  BP: 123/79 (03-23-21 @ 11:39) (123/79 - 132/83)  RR: 20 (03-23-21 @ 11:39) (18 - 20)  SpO2: 91% (03-23-21 @ 11:39) (91% - 96%)  Telemetry:   CAPILLARY BLOOD GLUCOSE        I&O's Summary    22 Mar 2021 07:01  -  23 Mar 2021 07:00  --------------------------------------------------------  IN: 960 mL / OUT: 0 mL / NET: 960 mL    23 Mar 2021 07:01  -  23 Mar 2021 13:40  --------------------------------------------------------  IN: 700 mL / OUT: 900 mL / NET: -200 mL        PHYSICAL EXAM:  GENERAL: NAD, well-developed  HEAD:  Atraumatic, Normocephalic  EYES: EOMI, PERRLA, conjunctiva and sclera clear  NECK: Supple, No JVD  CHEST/LUNG: Clear to auscultation bilaterally; No wheeze  HEART: Regular rate and rhythm; No murmurs, rubs, or gallops  ABDOMEN: Soft, Nontender, Nondistended; Bowel sounds present  EXTREMITIES:  2+ Peripheral Pulses, No clubbing, cyanosis, or edema  PSYCH: AAOx3  NEUROLOGY: non-focal  SKIN: No rashes or lesions    LABS:                        11.3   3.92  )-----------( 113      ( 23 Mar 2021 07:16 )             35.2     03-23    142  |  103  |  8   ----------------------------<  69<L>  3.8   |  26  |  0.89    Ca    8.6      23 Mar 2021 07:13  Phos  3.4     03-22  Mg     1.9     03-22    TPro  6.1  /  Alb  2.7<L>  /  TBili  0.3  /  DBili  <0.1  /  AST  22  /  ALT  25  /  AlkPhos  47  03-23              RADIOLOGY & ADDITIONAL TESTS:    Imaging Personally Reviewed:    Consultant(s) Notes Reviewed:      Care Discussed with Consultants/Other Providers:

## 2021-03-24 LAB
ALBUMIN SERPL ELPH-MCNC: 3.1 G/DL — LOW (ref 3.3–5)
ALP SERPL-CCNC: 49 U/L — SIGNIFICANT CHANGE UP (ref 40–120)
ALT FLD-CCNC: 22 U/L — SIGNIFICANT CHANGE UP (ref 10–45)
ANION GAP SERPL CALC-SCNC: 10 MMOL/L — SIGNIFICANT CHANGE UP (ref 5–17)
AST SERPL-CCNC: 19 U/L — SIGNIFICANT CHANGE UP (ref 10–40)
BILIRUB SERPL-MCNC: 0.2 MG/DL — SIGNIFICANT CHANGE UP (ref 0.2–1.2)
BUN SERPL-MCNC: 8 MG/DL — SIGNIFICANT CHANGE UP (ref 7–23)
CALCIUM SERPL-MCNC: 8.9 MG/DL — SIGNIFICANT CHANGE UP (ref 8.4–10.5)
CHLORIDE SERPL-SCNC: 103 MMOL/L — SIGNIFICANT CHANGE UP (ref 96–108)
CO2 SERPL-SCNC: 30 MMOL/L — SIGNIFICANT CHANGE UP (ref 22–31)
CREAT SERPL-MCNC: 0.98 MG/DL — SIGNIFICANT CHANGE UP (ref 0.5–1.3)
GLUCOSE SERPL-MCNC: 82 MG/DL — SIGNIFICANT CHANGE UP (ref 70–99)
HCT VFR BLD CALC: 34.5 % — LOW (ref 39–50)
HGB BLD-MCNC: 11.1 G/DL — LOW (ref 13–17)
MAGNESIUM SERPL-MCNC: 1.9 MG/DL — SIGNIFICANT CHANGE UP (ref 1.6–2.6)
MCHC RBC-ENTMCNC: 29.8 PG — SIGNIFICANT CHANGE UP (ref 27–34)
MCHC RBC-ENTMCNC: 32.2 GM/DL — SIGNIFICANT CHANGE UP (ref 32–36)
MCV RBC AUTO: 92.7 FL — SIGNIFICANT CHANGE UP (ref 80–100)
NRBC # BLD: 0 /100 WBCS — SIGNIFICANT CHANGE UP (ref 0–0)
PHOSPHATE SERPL-MCNC: 3.5 MG/DL — SIGNIFICANT CHANGE UP (ref 2.5–4.5)
PLATELET # BLD AUTO: 221 K/UL — SIGNIFICANT CHANGE UP (ref 150–400)
POTASSIUM SERPL-MCNC: 3.6 MMOL/L — SIGNIFICANT CHANGE UP (ref 3.5–5.3)
POTASSIUM SERPL-SCNC: 3.6 MMOL/L — SIGNIFICANT CHANGE UP (ref 3.5–5.3)
PROT SERPL-MCNC: 6.2 G/DL — SIGNIFICANT CHANGE UP (ref 6–8.3)
RBC # BLD: 3.72 M/UL — LOW (ref 4.2–5.8)
RBC # FLD: 13.7 % — SIGNIFICANT CHANGE UP (ref 10.3–14.5)
SODIUM SERPL-SCNC: 143 MMOL/L — SIGNIFICANT CHANGE UP (ref 135–145)
WBC # BLD: 3.65 K/UL — LOW (ref 3.8–10.5)
WBC # FLD AUTO: 3.65 K/UL — LOW (ref 3.8–10.5)

## 2021-03-24 RX ADMIN — Medication 50 MILLIGRAM(S): at 11:29

## 2021-03-24 RX ADMIN — APIXABAN 5 MILLIGRAM(S): 2.5 TABLET, FILM COATED ORAL at 17:14

## 2021-03-24 RX ADMIN — Medication 100 MILLIGRAM(S): at 05:06

## 2021-03-24 RX ADMIN — Medication 100 MILLIGRAM(S): at 15:01

## 2021-03-24 RX ADMIN — Medication 100 MILLIGRAM(S): at 23:27

## 2021-03-24 RX ADMIN — APIXABAN 5 MILLIGRAM(S): 2.5 TABLET, FILM COATED ORAL at 05:06

## 2021-03-24 RX ADMIN — Medication 50 MILLIGRAM(S): at 17:15

## 2021-03-24 RX ADMIN — Medication 50 MILLIGRAM(S): at 05:06

## 2021-03-24 RX ADMIN — Medication 10 MILLILITER(S): at 23:37

## 2021-03-24 RX ADMIN — Medication 10 MILLILITER(S): at 17:15

## 2021-03-24 RX ADMIN — Medication 10 MILLILITER(S): at 11:28

## 2021-03-24 RX ADMIN — Medication 10 MILLILITER(S): at 05:06

## 2021-03-24 NOTE — PROGRESS NOTE ADULT - SUBJECTIVE AND OBJECTIVE BOX
Infectious Diseases progress note:    Subjective: Resting comfortably, satting adequately on 4L nc    ROS:  CONSTITUTIONAL:  No fever, chills, rigors  CARDIOVASCULAR:  No chest pain or palpitations  RESPIRATORY:   No SOB, cough, dyspnea on exertion.  No wheezing  GASTROINTESTINAL:  No abd pain, N/V, diarrhea/constipation  EXTREMITIES:  No swelling or joint pain  GENITOURINARY:  No burning on urination, increased frequency or urgency.  No flank pain  NEUROLOGIC:  No HA, visual disturbances  SKIN: No rashes    Allergies    Actifed (Headache)  Sudafed (Headache)    Intolerances        ANTIBIOTICS/RELEVANT:  antimicrobials    immunologic:    OTHER:  acetaminophen   Tablet .. 650 milliGRAM(s) Oral every 6 hours PRN  apixaban 5 milliGRAM(s) Oral every 12 hours  benzonatate 100 milliGRAM(s) Oral every 8 hours  guaifenesin/dextromethorphan  Syrup 10 milliLiter(s) Oral every 6 hours  metoprolol tartrate 50 milliGRAM(s) Oral every 6 hours      Objective:  Vital Signs Last 24 Hrs  T(C): 36.5 (24 Mar 2021 11:20), Max: 37 (23 Mar 2021 21:22)  T(F): 97.7 (24 Mar 2021 11:20), Max: 98.6 (23 Mar 2021 21:22)  HR: 78 (24 Mar 2021 15:33) (58 - 78)  BP: 124/78 (24 Mar 2021 11:20) (119/80 - 134/82)  BP(mean): --  RR: 20 (24 Mar 2021 12:34) (18 - 20)  SpO2: 95% (24 Mar 2021 15:33) (90% - 100%)    PHYSICAL EXAM:  Constitutional:NAD  Eyes:KRISTEN, EOMI  Ear/Nose/Throat: no thrush, mucositis.  Moist mucous membranes	  Neck:no JVD, no lymphadenopathy, supple  Respiratory: CTA berna  Cardiovascular: S1S2 RRR, no murmurs  Gastrointestinal:soft, nontender,  nondistended (+) BS  Extremities:no e/e/c  Skin:  no rashes, open wounds or ulcerations        LABS:                        11.1   3.65  )-----------( 221      ( 24 Mar 2021 06:34 )             34.5     03-24    143  |  103  |  8   ----------------------------<  82  3.6   |  30  |  0.98    Ca    8.9      24 Mar 2021 06:34  Phos  3.5     03-24  Mg     1.9     03-24    TPro  6.2  /  Alb  3.1<L>  /  TBili  0.2  /  DBili  x   /  AST  19  /  ALT  22  /  AlkPhos  49  03-24          Procalcitonin, Serum: 0.58 (02-27 @ 13:25)                    MICROBIOLOGY:          RADIOLOGY & ADDITIONAL STUDIES:

## 2021-03-24 NOTE — PROGRESS NOTE ADULT - ASSESSMENT
ACute hypoxemic respiratory failure  Obesity  Multilobar viral pneumonia due to COVID 19  Atrial Fibrillation: now back in NSR  Refractory hypoxemia: multifactorial - residual COVID, obesity with basilar atelectasis  CTA 2/27 negative PE and LE dopplers negative 3/3    REC    Continue nocturnal CPAP 8 and prn day if sleeping  Taper nasal oxygen: target sat 88-90%: goal of 3 liters  Exertional hyoxemia likely to persist for near future  AC for AF per primar team  DC planning with home O2

## 2021-03-24 NOTE — PROGRESS NOTE ADULT - ASSESSMENT
The patient is a 65y Male, hx of morbid obesity, HTN on lisinopril, complaining of shortness of breath. covid + dx 5d prior. no home O2 requirement, former smoker. has dyspnea at rest and on exertion. has not taken steroids, no hospitalization for covid. denies CP. [facility] (27 Feb 2021 22:29)    ER vital:  T 98.4, P 178, /85.  Pt satting 85% on RA, placed on nasal cannula, advanced to NRB.  Covid pcr (+), covid serologies (-).   CT angio chest (-) for PE, (+) b/l patchy opacities.  Pt with elevated AST (>5x ULN but <10x) and ALT.   Pt started on remdesivir and dexamethasone.     Acute covid illness:    - Cont remdesivir and dexamethasone.  Monitor daily LFTs and renal function while pt on remdesivir.  LFTs elevated, possibly secondary to viral infection.  Currently ALT < 10x ULN, can cont remdesivir.  LFTs slowly improving.   Pt completed remdesivir and dexa.     - Monitor pulse ox, pt on supplemental O2 - transitioned to 4L nc    - Monitor inflammatory markers q72    DD:  192 <-- 272 <-- 359 <-- 248 <-- 213 <-- 282 <-- 343  ferritin: 666 <-- 692 <-- 1468 <-- 1636  pct:  0.58  crp: 1.28 <-- 3.0 <-- 2.2 <-- 10.3 <-- 10.9    - Pt now on apixiban for dvt ppx.  CT angio chest (-) for PE.   LE dopplers neg DVT    *  Repeat cxr 3/8 with continued b/l patchy opacities with slight worsening.  No indication for abx at this time.  Pt on VM mask.  Repeat covid swab sent 3/16 positive.      * Oxygenation improving, now on 4L nc.  Repeat cxr 3/22 with b/l patchy opacities c/w covid pna    Wandy Luke  [phone]

## 2021-03-24 NOTE — PROGRESS NOTE ADULT - SUBJECTIVE AND OBJECTIVE BOX
Follow-up Pulm Progress Note  Francisco Ramon MD  [phone]    Remains stable with sustained tolerance of 4 liters nasal cannula  Desautrates to mid 80's ambulating in room  CT with multilobar mixed opacities with some traction bronchiectasis    Vital Signs Last 24 Hrs  T(C): 36.5 (24 Mar 2021 11:20), Max: 37 (23 Mar 2021 21:22)  T(F): 97.7 (24 Mar 2021 11:20), Max: 98.6 (23 Mar 2021 21:22)  HR: 65 (24 Mar 2021 11:20) (58 - 73)  BP: 124/78 (24 Mar 2021 11:20) (119/80 - 134/82)  BP(mean): --  RR: 20 (24 Mar 2021 12:34) (18 - 20)  SpO2: 91% (24 Mar 2021 12:34) (90% - 100%)                          11.1   3.65  )-----------( 221      ( 24 Mar 2021 06:34 )             34.5     03-24    143  |  103  |  8   ----------------------------<  82  3.6   |  30  |  0.98    Ca    8.9      24 Mar 2021 06:34  Phos  3.5     03-24  Mg     1.9     03-24    TPro  6.2  /  Alb  3.1<L>  /  TBili  0.2  /  DBili  x   /  AST  19  /  ALT  22  /  AlkPhos  49  03-24    Physical Examination:  PULM: No wheeze  CVS: Regular rate and rhythm, no murmurs, rubs, or gallops  ABD: Soft, non-tender  EXT:  No clubbing, cyanosis, or edema    RADIOLOGY REVIEWED  CXR:    CT chest:    PROCEDURE DATE:  02/27/2021        INTERPRETATION:  CLINICAL INFORMATION: Hypoxia, Covid 19 infection, assess for pulmonary embolism    COMPARISON: Chest radiograph from same day    PROCEDURE:  CT Angiography of the Chest.  90 ml of Omnipaque 350 was injected intravenously. 10 ml were discarded.  Sagittal and coronal reformats were performed as well as 3D (MIP) reconstructions.    FINDINGS:    LUNGS AND AIRWAYS: Patent central airways.  Bilateral patchy groundglass and consolidative opacities with peripheral and lower lobe predominance.  PLEURA: No pleural effusion.  MEDIASTINUM AND KASIE: No lymphadenopathy.  VESSELS: No central, main, lobar, or segmental pulmonary embolism. Evaluation of the subsegmental pulmonary arteries is limited/nondiagnostic secondary to respiratory motion.  HEART: Heart size is normal. No pericardial effusion.  CHEST WALL AND LOWER NECK: Prominent right upper paratracheal lymph node may be reactive  VISUALIZED UPPER ABDOMEN: Small hiatal hernia.  BONES: Spinal degenerative changes.    IMPRESSION:  No central, main, lobar, or segmental pulmonary embolism. Evaluation of the subsegmental pulmonary arteries is limited/nondiagnostic secondary to respiratory motion.    Bilateral patchy groundglass and consolidative opacities with peripheral and lower lobe predominance, compatible with patient's known Covid 19 infection. Follow-up to resolution recommended.    TTE:

## 2021-03-25 LAB
ALBUMIN SERPL ELPH-MCNC: 2.9 G/DL — LOW (ref 3.3–5)
ALP SERPL-CCNC: 48 U/L — SIGNIFICANT CHANGE UP (ref 40–120)
ALT FLD-CCNC: 20 U/L — SIGNIFICANT CHANGE UP (ref 10–45)
ANION GAP SERPL CALC-SCNC: 10 MMOL/L — SIGNIFICANT CHANGE UP (ref 5–17)
AST SERPL-CCNC: 16 U/L — SIGNIFICANT CHANGE UP (ref 10–40)
BILIRUB SERPL-MCNC: 0.3 MG/DL — SIGNIFICANT CHANGE UP (ref 0.2–1.2)
BUN SERPL-MCNC: 11 MG/DL — SIGNIFICANT CHANGE UP (ref 7–23)
CALCIUM SERPL-MCNC: 8.6 MG/DL — SIGNIFICANT CHANGE UP (ref 8.4–10.5)
CHLORIDE SERPL-SCNC: 104 MMOL/L — SIGNIFICANT CHANGE UP (ref 96–108)
CO2 SERPL-SCNC: 28 MMOL/L — SIGNIFICANT CHANGE UP (ref 22–31)
CREAT SERPL-MCNC: 1.04 MG/DL — SIGNIFICANT CHANGE UP (ref 0.5–1.3)
CRP SERPL-MCNC: 6 MG/L — HIGH (ref 0–4)
D DIMER BLD IA.RAPID-MCNC: 167 NG/ML DDU — SIGNIFICANT CHANGE UP
FERRITIN SERPL-MCNC: 358 NG/ML — SIGNIFICANT CHANGE UP (ref 30–400)
GLUCOSE SERPL-MCNC: 88 MG/DL — SIGNIFICANT CHANGE UP (ref 70–99)
HCT VFR BLD CALC: 34.7 % — LOW (ref 39–50)
HGB BLD-MCNC: 11.1 G/DL — LOW (ref 13–17)
MCHC RBC-ENTMCNC: 29.9 PG — SIGNIFICANT CHANGE UP (ref 27–34)
MCHC RBC-ENTMCNC: 32 GM/DL — SIGNIFICANT CHANGE UP (ref 32–36)
MCV RBC AUTO: 93.5 FL — SIGNIFICANT CHANGE UP (ref 80–100)
NRBC # BLD: 0 /100 WBCS — SIGNIFICANT CHANGE UP (ref 0–0)
PLATELET # BLD AUTO: 248 K/UL — SIGNIFICANT CHANGE UP (ref 150–400)
POTASSIUM SERPL-MCNC: 3.7 MMOL/L — SIGNIFICANT CHANGE UP (ref 3.5–5.3)
POTASSIUM SERPL-SCNC: 3.7 MMOL/L — SIGNIFICANT CHANGE UP (ref 3.5–5.3)
PROT SERPL-MCNC: 6.2 G/DL — SIGNIFICANT CHANGE UP (ref 6–8.3)
RBC # BLD: 3.71 M/UL — LOW (ref 4.2–5.8)
RBC # FLD: 13.6 % — SIGNIFICANT CHANGE UP (ref 10.3–14.5)
SODIUM SERPL-SCNC: 142 MMOL/L — SIGNIFICANT CHANGE UP (ref 135–145)
WBC # BLD: 4.39 K/UL — SIGNIFICANT CHANGE UP (ref 3.8–10.5)
WBC # FLD AUTO: 4.39 K/UL — SIGNIFICANT CHANGE UP (ref 3.8–10.5)

## 2021-03-25 RX ADMIN — Medication 10 MILLILITER(S): at 23:50

## 2021-03-25 RX ADMIN — Medication 50 MILLIGRAM(S): at 18:43

## 2021-03-25 RX ADMIN — Medication 100 MILLIGRAM(S): at 21:47

## 2021-03-25 RX ADMIN — Medication 10 MILLILITER(S): at 05:25

## 2021-03-25 RX ADMIN — Medication 50 MILLIGRAM(S): at 13:46

## 2021-03-25 RX ADMIN — Medication 100 MILLIGRAM(S): at 06:25

## 2021-03-25 RX ADMIN — Medication 10 MILLILITER(S): at 13:45

## 2021-03-25 RX ADMIN — Medication 100 MILLIGRAM(S): at 13:46

## 2021-03-25 RX ADMIN — APIXABAN 5 MILLIGRAM(S): 2.5 TABLET, FILM COATED ORAL at 06:25

## 2021-03-25 RX ADMIN — Medication 10 MILLILITER(S): at 18:43

## 2021-03-25 RX ADMIN — Medication 50 MILLIGRAM(S): at 23:51

## 2021-03-25 RX ADMIN — APIXABAN 5 MILLIGRAM(S): 2.5 TABLET, FILM COATED ORAL at 18:43

## 2021-03-25 RX ADMIN — Medication 50 MILLIGRAM(S): at 06:26

## 2021-03-25 NOTE — PROGRESS NOTE ADULT - SUBJECTIVE AND OBJECTIVE BOX
Infectious Diseases progress note:    Subjective: NAD, satting adequately on 4L    ROS:  CONSTITUTIONAL:  No fever, chills, rigors  CARDIOVASCULAR:  No chest pain or palpitations  RESPIRATORY:   No SOB, cough, dyspnea on exertion.  No wheezing  GASTROINTESTINAL:  No abd pain, N/V, diarrhea/constipation  EXTREMITIES:  No swelling or joint pain  GENITOURINARY:  No burning on urination, increased frequency or urgency.  No flank pain  NEUROLOGIC:  No HA, visual disturbances  SKIN: No rashes    Allergies    Actifed (Headache)  Sudafed (Headache)    Intolerances        ANTIBIOTICS/RELEVANT:  antimicrobials    immunologic:    OTHER:  acetaminophen   Tablet .. 650 milliGRAM(s) Oral every 6 hours PRN  apixaban 5 milliGRAM(s) Oral every 12 hours  benzonatate 100 milliGRAM(s) Oral every 8 hours  guaifenesin/dextromethorphan  Syrup 10 milliLiter(s) Oral every 6 hours  metoprolol tartrate 50 milliGRAM(s) Oral every 6 hours      Objective:  Vital Signs Last 24 Hrs  T(C): 36.8 (25 Mar 2021 20:39), Max: 36.8 (25 Mar 2021 04:41)  T(F): 98.3 (25 Mar 2021 20:39), Max: 98.3 (25 Mar 2021 20:39)  HR: 70 (25 Mar 2021 23:50) (48 - 76)  BP: 126/89 (25 Mar 2021 23:50) (120/80 - 157/84)  BP(mean): --  RR: 20 (25 Mar 2021 20:39) (18 - 20)  SpO2: 96% (25 Mar 2021 22:19) (82% - 100%)    PHYSICAL EXAM:  Constitutional:NAD  Eyes:KRISTEN, EOMI  Ear/Nose/Throat: no thrush, mucositis.  Moist mucous membranes	  Neck:no JVD, no lymphadenopathy, supple  Respiratory: CTA berna  Cardiovascular: S1S2 RRR, no murmurs  Gastrointestinal:soft, nontender,  nondistended (+) BS  Extremities:no e/e/c  Skin:  no rashes, open wounds or ulcerations        LABS:                        11.1   4.39  )-----------( 248      ( 25 Mar 2021 05:06 )             34.7     03-25    142  |  104  |  11  ----------------------------<  88  3.7   |  28  |  1.04    Ca    8.6      25 Mar 2021 05:06  Phos  3.5     03-24  Mg     1.9     03-24    TPro  6.2  /  Alb  2.9<L>  /  TBili  0.3  /  DBili  x   /  AST  16  /  ALT  20  /  AlkPhos  48  03-25          Procalcitonin, Serum: 0.58 (02-27 @ 13:25)                    MICROBIOLOGY:          RADIOLOGY & ADDITIONAL STUDIES:

## 2021-03-25 NOTE — PROGRESS NOTE ADULT - SUBJECTIVE AND OBJECTIVE BOX
Follow-up Pulm Progress Note  Francisco Ramon MD  [phone]    Stable, OOB in good spirits  On 4 liters, rest sats in low 90's with transient desaturation with activity    Vital Signs Last 24 Hrs  T(C): 36.6 (25 Mar 2021 11:06), Max: 37 (24 Mar 2021 20:20)  T(F): 97.8 (25 Mar 2021 11:06), Max: 98.6 (24 Mar 2021 20:20)  HR: 68 (25 Mar 2021 11:06) (48 - 78)  BP: 138/74 (25 Mar 2021 11:06) (125/79 - 157/84)  BP(mean): --  RR: 18 (25 Mar 2021 11:06) (18 - 20)  SpO2: 90% (25 Mar 2021 13:22) (82% - 100%)                         11.1   4.39  )-----------( 248      ( 25 Mar 2021 05:06 )             34.7     03-25    142  |  104  |  11  ----------------------------<  88  3.7   |  28  |  1.04    Ca    8.6      25 Mar 2021 05:06  Phos  3.5     03-24  Mg     1.9     03-24    TPro  6.2  /  Alb  2.9<L>  /  TBili  0.3  /  DBili  x   /  AST  16  /  ALT  20  /  AlkPhos  48  03-25    Physical Examination:  PULM: No wheeze  CVS: Regular rate and rhythm, no murmurs, rubs, or gallops  ABD: Soft, non-tender  EXT:  No clubbing, cyanosis, or edema    RADIOLOGY REVIEWED  CXR:    CT chest:    PROCEDURE DATE:  02/27/2021        INTERPRETATION:  CLINICAL INFORMATION: Hypoxia, Covid 19 infection, assess for pulmonary embolism    COMPARISON: Chest radiograph from same day    PROCEDURE:  CT Angiography of the Chest.  90 ml of Omnipaque 350 was injected intravenously. 10 ml were discarded.  Sagittal and coronal reformats were performed as well as 3D (MIP) reconstructions.    FINDINGS:    LUNGS AND AIRWAYS: Patent central airways.  Bilateral patchy groundglass and consolidative opacities with peripheral and lower lobe predominance.  PLEURA: No pleural effusion.  MEDIASTINUM AND KASIE: No lymphadenopathy.  VESSELS: No central, main, lobar, or segmental pulmonary embolism. Evaluation of the subsegmental pulmonary arteries is limited/nondiagnostic secondary to respiratory motion.  HEART: Heart size is normal. No pericardial effusion.  CHEST WALL AND LOWER NECK: Prominent right upper paratracheal lymph node may be reactive  VISUALIZED UPPER ABDOMEN: Small hiatal hernia.  BONES: Spinal degenerative changes.    IMPRESSION:  No central, main, lobar, or segmental pulmonary embolism. Evaluation of the subsegmental pulmonary arteries is limited/nondiagnostic secondary to respiratory motion.    Bilateral patchy groundglass and consolidative opacities with peripheral and lower lobe predominance, compatible with patient's known Covid 19 infection. Follow-up to resolution recommended.    TTE:

## 2021-03-25 NOTE — PROGRESS NOTE ADULT - ASSESSMENT
ACute hypoxemic respiratory failure  Obesity  Multilobar viral pneumonia due to COVID 19  Atrial Fibrillation: now back in NSR  Refractory hypoxemia: multifactorial - residual COVID, obesity with basilar atelectasis  CTA 2/27 negative PE and LE dopplers negative 3/3    REC    Continue nocturnal CPAP 8 and prn day if sleeping  Trial nasal cannula at 3 liters

## 2021-03-25 NOTE — PROGRESS NOTE ADULT - ASSESSMENT
The patient is a 65y Male, hx of morbid obesity, HTN on lisinopril, complaining of shortness of breath. covid + dx 5d prior. no home O2 requirement, former smoker. has dyspnea at rest and on exertion. has not taken steroids, no hospitalization for covid. denies CP. [facility]. (27 Feb 2021 22:29)    ER vital:  T 98.4, P 178, /85.  Pt satting 85% on RA, placed on nasal cannula, advanced to NRB.  Covid pcr (+), covid serologies (-).   CT angio chest (-) for PE, (+) b/l patchy opacities.  Pt with elevated AST (>5x ULN but <10x) and ALT.   Pt started on remdesivir and dexamethasone.     Acute covid illness:    - Cont remdesivir and dexamethasone.  Monitor daily LFTs and renal function while pt on remdesivir.  LFTs elevated, possibly secondary to viral infection.  Currently ALT < 10x ULN, can cont remdesivir.  LFTs slowly improving.   Pt completed remdesivir and dexa.     - Monitor pulse ox, pt on supplemental O2 - transitioned to 4L nc    - Monitor inflammatory markers q72    DD:  192 <-- 272 <-- 359 <-- 248 <-- 213 <-- 282 <-- 343  ferritin: 666 <-- 692 <-- 1468 <-- 1636  pct:  0.58  crp: 1.28 <-- 3.0 <-- 2.2 <-- 10.3 <-- 10.9    - Pt now on apixiban for dvt ppx.  CT angio chest (-) for PE.   LE dopplers neg DVT    *  Repeat cxr 3/8 with continued b/l patchy opacities with slight worsening.  No indication for abx at this time.  Pt on VM mask.  Repeat covid swab sent 3/16 positive.      * Oxygenation improving, now on 4L nc.  Repeat cxr 3/22 with b/l patchy opacities c/w covid pna.   Cont present managment.  Wean O2 as tolerated.     Wandy Luke  [phone]

## 2021-03-25 NOTE — PROGRESS NOTE ADULT - SUBJECTIVE AND OBJECTIVE BOX
Patient is a 65y old  Male who presents with a chief complaint of acute hypoxemic respiratory failure (25 Mar 2021 15:56)      SUBJECTIVE / OVERNIGHT EVENTS:    Events noted.  CONSTITUTIONAL: No fever,  or fatigue  RESPIRATORY: No cough, wheezing,  On supp O2  CARDIOVASCULAR: No chest pain, palpitations, dizziness, or leg swelling  GASTROINTESTINAL: No abdominal or epigastric pain.   NEUROLOGICAL: No headaches,     MEDICATIONS  (STANDING):  apixaban 5 milliGRAM(s) Oral every 12 hours  benzonatate 100 milliGRAM(s) Oral every 8 hours  guaifenesin/dextromethorphan  Syrup 10 milliLiter(s) Oral every 6 hours  metoprolol tartrate 50 milliGRAM(s) Oral every 6 hours    MEDICATIONS  (PRN):  acetaminophen   Tablet .. 650 milliGRAM(s) Oral every 6 hours PRN Mild Pain (1 - 3)        CAPILLARY BLOOD GLUCOSE        I&O's Summary    24 Mar 2021 07:01  -  25 Mar 2021 07:00  --------------------------------------------------------  IN: 720 mL / OUT: 1200 mL / NET: -480 mL    25 Mar 2021 07:01  -  25 Mar 2021 23:19  --------------------------------------------------------  IN: 720 mL / OUT: 1000 mL / NET: -280 mL        T(C): 36.8 (03-25-21 @ 20:39), Max: 36.8 (03-25-21 @ 04:41)  HR: 63 (03-25-21 @ 22:19) (48 - 76)  BP: 143/88 (03-25-21 @ 20:39) (120/80 - 157/84)  RR: 20 (03-25-21 @ 20:39) (18 - 20)  SpO2: 96% (03-25-21 @ 22:19) (82% - 100%)    PHYSICAL EXAM:  GENERAL: NAD  NECK: Supple, No JVD  CHEST/LUNG: Clear to auscultation bilaterally; No wheezing.  HEART: Regular rate and rhythm; No murmurs, rubs, or gallops  ABDOMEN: Soft, Nontender, Nondistended; Bowel sounds present  EXTREMITIES:   No edema  NEUROLOGY: AAO X 3      LABS:                        11.1   4.39  )-----------( 248      ( 25 Mar 2021 05:06 )             34.7     03-25    142  |  104  |  11  ----------------------------<  88  3.7   |  28  |  1.04    Ca    8.6      25 Mar 2021 05:06  Phos  3.5     03-24  Mg     1.9     03-24    TPro  6.2  /  Alb  2.9<L>  /  TBili  0.3  /  DBili  x   /  AST  16  /  ALT  20  /  AlkPhos  48  03-25            CAPILLARY BLOOD GLUCOSE            RADIOLOGY & ADDITIONAL TESTS:    Imaging Personally Reviewed:    Consultant(s) Notes Reviewed:      Care Discussed with Consultants/Other Providers:    Rosendo Bhatti MD, CMD, FACP    [address] [place] [address]  Office Tel: [phone]  Cell: [phone]

## 2021-03-25 NOTE — PROGRESS NOTE ADULT - SUBJECTIVE AND OBJECTIVE BOX
Subjective: Patient seen and examined. No new events except as noted.     REVIEW OF SYSTEMS:    CONSTITUTIONAL:+ weakness, fevers or chills  EYES/ENT: No visual changes;  No vertigo or throat pain   NECK: No pain or stiffness  RESPIRATORY: No cough, wheezing, hemoptysis; No shortness of breath  CARDIOVASCULAR: No chest pain or palpitations  GASTROINTESTINAL: No abdominal or epigastric pain. No nausea, vomiting, or hematemesis; No diarrhea or constipation. No melena or hematochezia.  GENITOURINARY: No dysuria, frequency or hematuria  NEUROLOGICAL: No numbness or weakness  SKIN: No itching, burning, rashes, or lesions   All other review of systems is negative unless indicated above.    MEDICATIONS:  MEDICATIONS  (STANDING):  apixaban 5 milliGRAM(s) Oral every 12 hours  benzonatate 100 milliGRAM(s) Oral every 8 hours  guaifenesin/dextromethorphan  Syrup 10 milliLiter(s) Oral every 6 hours  metoprolol tartrate 50 milliGRAM(s) Oral every 6 hours      PHYSICAL EXAM:  T(C): 36.6 (03-25-21 @ 11:06), Max: 37 (03-24-21 @ 20:20)  HR: 62 (03-25-21 @ 15:33) (48 - 76)  BP: 138/74 (03-25-21 @ 11:06) (125/79 - 157/84)  RR: 18 (03-25-21 @ 11:06) (18 - 20)  SpO2: 95% (03-25-21 @ 15:33) (82% - 100%)  Wt(kg): --  I&O's Summary    24 Mar 2021 07:01  -  25 Mar 2021 07:00  --------------------------------------------------------  IN: 720 mL / OUT: 1200 mL / NET: -480 mL    25 Mar 2021 07:01  -  25 Mar 2021 15:56  --------------------------------------------------------  IN: 240 mL / OUT: 800 mL / NET: -560 mL          Appearance: Normal	  HEENT:   Normal oral mucosa, PERRL, EOMI	  Lymphatic: No lymphadenopathy , no edema  Cardiovascular: Normal S1 S2, No JVD, No murmurs , Peripheral pulses palpable 2+ bilaterally  Respiratory: Lungs clear to auscultation, normal effort 	  Gastrointestinal:  Soft, Non-tender, + BS	  Skin: No rashes, No ecchymoses, No cyanosis, warm to touch  Musculoskeletal: Normal range of motion, normal strength  Psychiatry:  Mood & affect appropriate  Ext: No edema      LABS:    CARDIAC MARKERS:                                11.1   4.39  )-----------( 248      ( 25 Mar 2021 05:06 )             34.7     03-25    142  |  104  |  11  ----------------------------<  88  3.7   |  28  |  1.04    Ca    8.6      25 Mar 2021 05:06  Phos  3.5     03-24  Mg     1.9     03-24    TPro  6.2  /  Alb  2.9<L>  /  TBili  0.3  /  DBili  x   /  AST  16  /  ALT  20  /  AlkPhos  48  03-25    proBNP:   Lipid Profile:   HgA1c:   TSH:             TELEMETRY: 	  SR   ECG:  	SR   RADIOLOGY:   DIAGNOSTIC TESTING:  [ ] Echocardiogram:  [ ]  Catheterization:  [ ] Stress Test:    OTHER:

## 2021-03-25 NOTE — CHART NOTE - NSCHARTNOTEFT_GEN_A_CORE
Nutrition Follow Up Note  Patient seen for: nutrition follow-up.    Chart reviewed, events noted.    Source: Pt, EMR    Diet : DASH/TLC     Patient reports: Good PO intake, has been eating 3 meals/day, enjoys food from home. No nausea/vomiting.    PO intake : >75%    GI:  Regular BMs BID per pt    Pertinent Medications: MEDICATIONS  (STANDING):  apixaban 5 milliGRAM(s) Oral every 12 hours  benzonatate 100 milliGRAM(s) Oral every 8 hours  guaifenesin/dextromethorphan  Syrup 10 milliLiter(s) Oral every 6 hours  metoprolol tartrate 50 milliGRAM(s) Oral every 6 hours    MEDICATIONS  (PRN):  acetaminophen   Tablet .. 650 milliGRAM(s) Oral every 6 hours PRN Mild Pain (1 - 3)    Pertinent Labs: 03-25 @ 05:06: Na 142, BUN 11, Cr 1.04, BG 88, K+ 3.7, Phos --, Mg --, Alk Phos 48, ALT/SGPT 20, AST/SGOT 16, HbA1c --    Finger Sticks:      Skin per nursing documentation: no pressure injuries   Edema: 1+ berna. feet    Estimated Needs:   [ x] no change since previous assessment  [ ] recalculated:     Previous Nutrition Dx:   1. inadequate protein energy intake - resolved.  2. overweight/obesity- ongoing.    Nutrition Care Plan:  [ ] In Progress  [x ] Achieved    Education: Provided pt with verbal pre-DM education for Ac1 6.2%. Pt was receptive. Revealed diet is high in sweets.    Recommended Interventions:   1) Continue DASH diet, no need for Consistent Carbohydrate restriction antonio this time, BG well-controlled.   2) Continue to monitor PO intake  3) Pt aware RD remains available.    Monitoring and Evaluation:   Continue to monitor Nutritional intake, Tolerance to diet prescription, weights, labs, skin integrity    Dona Escobar RD, CDN  Pager [phone]  RD remains available upon request and will follow up per protocol

## 2021-03-26 LAB
ANION GAP SERPL CALC-SCNC: 11 MMOL/L — SIGNIFICANT CHANGE UP (ref 5–17)
BUN SERPL-MCNC: 11 MG/DL — SIGNIFICANT CHANGE UP (ref 7–23)
CALCIUM SERPL-MCNC: 8.8 MG/DL — SIGNIFICANT CHANGE UP (ref 8.4–10.5)
CHLORIDE SERPL-SCNC: 102 MMOL/L — SIGNIFICANT CHANGE UP (ref 96–108)
CO2 SERPL-SCNC: 27 MMOL/L — SIGNIFICANT CHANGE UP (ref 22–31)
CREAT SERPL-MCNC: 1.02 MG/DL — SIGNIFICANT CHANGE UP (ref 0.5–1.3)
GLUCOSE SERPL-MCNC: 77 MG/DL — SIGNIFICANT CHANGE UP (ref 70–99)
HCT VFR BLD CALC: 34.5 % — LOW (ref 39–50)
HGB BLD-MCNC: 10.7 G/DL — LOW (ref 13–17)
MCHC RBC-ENTMCNC: 29.2 PG — SIGNIFICANT CHANGE UP (ref 27–34)
MCHC RBC-ENTMCNC: 31 GM/DL — LOW (ref 32–36)
MCV RBC AUTO: 94 FL — SIGNIFICANT CHANGE UP (ref 80–100)
NRBC # BLD: 0 /100 WBCS — SIGNIFICANT CHANGE UP (ref 0–0)
PLATELET # BLD AUTO: 287 K/UL — SIGNIFICANT CHANGE UP (ref 150–400)
POTASSIUM SERPL-MCNC: 4 MMOL/L — SIGNIFICANT CHANGE UP (ref 3.5–5.3)
POTASSIUM SERPL-SCNC: 4 MMOL/L — SIGNIFICANT CHANGE UP (ref 3.5–5.3)
RBC # BLD: 3.67 M/UL — LOW (ref 4.2–5.8)
RBC # FLD: 13.8 % — SIGNIFICANT CHANGE UP (ref 10.3–14.5)
SODIUM SERPL-SCNC: 140 MMOL/L — SIGNIFICANT CHANGE UP (ref 135–145)
WBC # BLD: 4.29 K/UL — SIGNIFICANT CHANGE UP (ref 3.8–10.5)
WBC # FLD AUTO: 4.29 K/UL — SIGNIFICANT CHANGE UP (ref 3.8–10.5)

## 2021-03-26 RX ORDER — METOPROLOL TARTRATE 50 MG
100 TABLET ORAL
Refills: 0 | Status: DISCONTINUED | OUTPATIENT
Start: 2021-03-26 | End: 2021-04-05

## 2021-03-26 RX ADMIN — APIXABAN 5 MILLIGRAM(S): 2.5 TABLET, FILM COATED ORAL at 05:47

## 2021-03-26 RX ADMIN — Medication 100 MILLIGRAM(S): at 05:47

## 2021-03-26 RX ADMIN — Medication 10 MILLILITER(S): at 18:23

## 2021-03-26 RX ADMIN — APIXABAN 5 MILLIGRAM(S): 2.5 TABLET, FILM COATED ORAL at 18:23

## 2021-03-26 RX ADMIN — Medication 10 MILLILITER(S): at 05:46

## 2021-03-26 RX ADMIN — Medication 100 MILLIGRAM(S): at 21:14

## 2021-03-26 RX ADMIN — Medication 10 MILLILITER(S): at 23:46

## 2021-03-26 RX ADMIN — Medication 100 MILLIGRAM(S): at 18:23

## 2021-03-26 RX ADMIN — Medication 50 MILLIGRAM(S): at 05:46

## 2021-03-26 RX ADMIN — Medication 100 MILLIGRAM(S): at 19:09

## 2021-03-26 NOTE — PROGRESS NOTE ADULT - SUBJECTIVE AND OBJECTIVE BOX
Subjective: Patient seen and examined. No new events except as noted.     REVIEW OF SYSTEMS:    CONSTITUTIONAL:+ weakness, fevers or chills  EYES/ENT: No visual changes;  No vertigo or throat pain   NECK: No pain or stiffness  RESPIRATORY: No cough, wheezing, hemoptysis; No shortness of breath  CARDIOVASCULAR: No chest pain or palpitations  GASTROINTESTINAL: No abdominal or epigastric pain. No nausea, vomiting, or hematemesis; No diarrhea or constipation. No melena or hematochezia.  GENITOURINARY: No dysuria, frequency or hematuria  NEUROLOGICAL: No numbness or weakness  SKIN: No itching, burning, rashes, or lesions   All other review of systems is negative unless indicated above.    MEDICATIONS:  MEDICATIONS  (STANDING):  apixaban 5 milliGRAM(s) Oral every 12 hours  benzonatate 100 milliGRAM(s) Oral every 8 hours  guaifenesin/dextromethorphan  Syrup 10 milliLiter(s) Oral every 6 hours  metoprolol tartrate 50 milliGRAM(s) Oral every 6 hours      PHYSICAL EXAM:  T(C): 36.8 (03-26-21 @ 04:50), Max: 36.8 (03-25-21 @ 20:39)  HR: 71 (03-26-21 @ 08:18) (60 - 71)  BP: 135/82 (03-26-21 @ 04:50) (120/80 - 143/88)  RR: 20 (03-26-21 @ 04:50) (18 - 20)  SpO2: 90% (03-26-21 @ 08:18) (82% - 97%)  Wt(kg): --  I&O's Summary    25 Mar 2021 07:01  -  26 Mar 2021 07:00  --------------------------------------------------------  IN: 960 mL / OUT: 1000 mL / NET: -40 mL          Appearance: Normal	  HEENT:   Normal oral mucosa, PERRL, EOMI	  Lymphatic: No lymphadenopathy , no edema  Cardiovascular: Normal S1 S2, No JVD, No murmurs , Peripheral pulses palpable 2+ bilaterally  Respiratory: Decreased bs   Gastrointestinal:  Soft, Non-tender, + BS	  Skin: No rashes, No ecchymoses, No cyanosis, warm to touch  Musculoskeletal: Normal range of motion, normal strength  Psychiatry:  Mood & affect appropriate  Ext: No edema      LABS:    CARDIAC MARKERS:                                10.7   4.29  )-----------( 287      ( 26 Mar 2021 07:15 )             34.5     03-26    140  |  102  |  11  ----------------------------<  77  4.0   |  27  |  1.02    Ca    8.8      26 Mar 2021 07:13    TPro  6.2  /  Alb  2.9<L>  /  TBili  0.3  /  DBili  x   /  AST  16  /  ALT  20  /  AlkPhos  48  03-25    proBNP:   Lipid Profile:   HgA1c:   TSH:             TELEMETRY: 	    ECG:  	  RADIOLOGY:   DIAGNOSTIC TESTING:  [ ] Echocardiogram:  [ ]  Catheterization:  [ ] Stress Test:    OTHER:

## 2021-03-26 NOTE — PROGRESS NOTE ADULT - SUBJECTIVE AND OBJECTIVE BOX
Infectious Diseases progress note:    Subjective:  NAD, sitting comfortably in chair, in good spirits.  On 3L nc    ROS:  CONSTITUTIONAL:  No fever, chills, rigors  CARDIOVASCULAR:  No chest pain or palpitations  RESPIRATORY:   No SOB, cough, dyspnea on exertion.  No wheezing  GASTROINTESTINAL:  No abd pain, N/V, diarrhea/constipation  EXTREMITIES:  No swelling or joint pain  GENITOURINARY:  No burning on urination, increased frequency or urgency.  No flank pain  NEUROLOGIC:  No HA, visual disturbances  SKIN: No rashes    Allergies    Actifed (Headache)  Sudafed (Headache)    Intolerances        ANTIBIOTICS/RELEVANT:  antimicrobials    immunologic:    OTHER:  acetaminophen   Tablet .. 650 milliGRAM(s) Oral every 6 hours PRN  apixaban 5 milliGRAM(s) Oral every 12 hours  benzonatate 100 milliGRAM(s) Oral every 8 hours  guaifenesin/dextromethorphan  Syrup 10 milliLiter(s) Oral every 6 hours  metoprolol tartrate 100 milliGRAM(s) Oral two times a day      Objective:  Vital Signs Last 24 Hrs  T(C): 36.9 (26 Mar 2021 20:51), Max: 36.9 (26 Mar 2021 12:19)  T(F): 98.5 (26 Mar 2021 20:51), Max: 98.5 (26 Mar 2021 12:19)  HR: 52 (26 Mar 2021 21:42) (52 - 71)  BP: 147/85 (26 Mar 2021 20:51) (118/73 - 147/85)  BP(mean): --  RR: 18 (26 Mar 2021 20:51) (18 - 20)  SpO2: 98% (26 Mar 2021 21:42) (90% - 98%)    PHYSICAL EXAM:  Constitutional:NAD  Eyes:KRISTEN, EOMI  Ear/Nose/Throat: no thrush, mucositis.  Moist mucous membranes	  Neck:no JVD, no lymphadenopathy, supple  Respiratory: CTA berna  Cardiovascular: S1S2 RRR, no murmurs  Gastrointestinal:soft, nontender,  nondistended (+) BS  Extremities:no e/e/c  Skin:  no rashes, open wounds or ulcerations        LABS:                        10.7   4.29  )-----------( 287      ( 26 Mar 2021 07:15 )             34.5     03-26    140  |  102  |  11  ----------------------------<  77  4.0   |  27  |  1.02    Ca    8.8      26 Mar 2021 07:13    TPro  6.2  /  Alb  2.9<L>  /  TBili  0.3  /  DBili  x   /  AST  16  /  ALT  20  /  AlkPhos  48  03-25          Procalcitonin, Serum: 0.58 (02-27 @ 13:25)                    MICROBIOLOGY:          RADIOLOGY & ADDITIONAL STUDIES:

## 2021-03-26 NOTE — PROGRESS NOTE ADULT - ASSESSMENT
The patient is a 65y Male, hx of morbid obesity, HTN on lisinopril, complaining of shortness of breath. covid + dx 5d prior. no home O2 requirement, former smoker. has dyspnea at rest and on exertion. has not taken steroids, no hospitalization for covid. denies CP. [facility] (27 Feb 2021 22:29)    ER vital:  T 98.4, P 178, /85.  Pt satting 85% on RA, placed on nasal cannula, advanced to NRB.  Covid pcr (+), covid serologies (-).   CT angio chest (-) for PE, (+) b/l patchy opacities.  Pt with elevated AST (>5x ULN but <10x) and ALT.   Pt started on remdesivir and dexamethasone.     Acute covid illness:    - Cont remdesivir and dexamethasone.  Monitor daily LFTs and renal function while pt on remdesivir.  LFTs elevated, possibly secondary to viral infection.  Currently ALT < 10x ULN, can cont remdesivir.  LFTs slowly improving.   Pt completed remdesivir and dexa.     - Monitor pulse ox, pt on supplemental O2 - transitioned to 4L nc    - Monitor inflammatory markers q72    DD:  192 <-- 272 <-- 359 <-- 248 <-- 213 <-- 282 <-- 343  ferritin: 666 <-- 692 <-- 1468 <-- 1636  pct:  0.58  crp: 1.28 <-- 3.0 <-- 2.2 <-- 10.3 <-- 10.9    - Pt now on apixiban for dvt ppx.  CT angio chest (-) for PE.   LE dopplers neg DVT    *  Repeat cxr 3/8 with continued b/l patchy opacities with slight worsening.  No indication for abx at this time.  Pt on VM mask.  Repeat covid swab sent 3/16 positive.      * Oxygenation improving, now on 4L nc.  Repeat cxr 3/22 with b/l patchy opacities c/w covid pna.   Cont present managment.  Wean O2 as tolerated.     Wandy Luke  [phone]

## 2021-03-26 NOTE — PROGRESS NOTE ADULT - ASSESSMENT
ACute hypoxemic respiratory failure  Obesity  Multilobar viral pneumonia due to COVID 19  Atrial Fibrillation: now back in NSR  Refractory hypoxemia: multifactorial - residual COVID, obesity with basilar atelectasis  CTA 2/27 negative PE and LE dopplers negative 3/3    REC    Continue nocturnal CPAP 8 and prn day if sleeping  Transfer to non covid floor pending availability  Continue 4 liters nasal cannula  DC planning

## 2021-03-26 NOTE — PROGRESS NOTE ADULT - SUBJECTIVE AND OBJECTIVE BOX
Follow-up Pulm Progress Note  Francisco Ramon MD  [phone]    Good spiritis  Requirng 4 liters nasal cannula with ongoing desaturaion during activity  No longer in isolation      Vital Signs Last 24 Hrs  T(C): 36.9 (26 Mar 2021 12:19), Max: 36.9 (26 Mar 2021 12:19)  T(F): 98.5 (26 Mar 2021 12:19), Max: 98.5 (26 Mar 2021 12:19)  HR: 64 (26 Mar 2021 12:19) (60 - 71)  BP: 118/73 (26 Mar 2021 12:19) (118/73 - 143/88)  BP(mean): --  RR: 18 (26 Mar 2021 12:19) (18 - 20)  SpO2: 90% (26 Mar 2021 12:19) (82% - 97%)                          10.7   4.29  )-----------( 287      ( 26 Mar 2021 07:15 )             34.5       03-26    140  |  102  |  11  ----------------------------<  77  4.0   |  27  |  1.02    Ca    8.8      26 Mar 2021 07:13    TPro  6.2  /  Alb  2.9<L>  /  TBili  0.3  /  DBili  x   /  AST  16  /  ALT  20  /  AlkPhos  48  03-25    Physical Examination:  PULM: No wheeze  CVS: Regular rate and rhythm, no murmurs, rubs, or gallops  ABD: Soft, non-tender  EXT:  No clubbing, cyanosis, or edema    RADIOLOGY REVIEWED  CXR:    CT chest:    PROCEDURE DATE:  02/27/2021        INTERPRETATION:  CLINICAL INFORMATION: Hypoxia, Covid 19 infection, assess for pulmonary embolism    COMPARISON: Chest radiograph from same day    PROCEDURE:  CT Angiography of the Chest.  90 ml of Omnipaque 350 was injected intravenously. 10 ml were discarded.  Sagittal and coronal reformats were performed as well as 3D (MIP) reconstructions.    FINDINGS:    LUNGS AND AIRWAYS: Patent central airways.  Bilateral patchy groundglass and consolidative opacities with peripheral and lower lobe predominance.  PLEURA: No pleural effusion.  MEDIASTINUM AND KASIE: No lymphadenopathy.  VESSELS: No central, main, lobar, or segmental pulmonary embolism. Evaluation of the subsegmental pulmonary arteries is limited/nondiagnostic secondary to respiratory motion.  HEART: Heart size is normal. No pericardial effusion.  CHEST WALL AND LOWER NECK: Prominent right upper paratracheal lymph node may be reactive  VISUALIZED UPPER ABDOMEN: Small hiatal hernia.  BONES: Spinal degenerative changes.    IMPRESSION:  No central, main, lobar, or segmental pulmonary embolism. Evaluation of the subsegmental pulmonary arteries is limited/nondiagnostic secondary to respiratory motion.    Bilateral patchy groundglass and consolidative opacities with peripheral and lower lobe predominance, compatible with patient's known Covid 19 infection. Follow-up to resolution recommended.    TTE:

## 2021-03-27 LAB
ANION GAP SERPL CALC-SCNC: 11 MMOL/L — SIGNIFICANT CHANGE UP (ref 5–17)
BUN SERPL-MCNC: 8 MG/DL — SIGNIFICANT CHANGE UP (ref 7–23)
CALCIUM SERPL-MCNC: 8.8 MG/DL — SIGNIFICANT CHANGE UP (ref 8.4–10.5)
CHLORIDE SERPL-SCNC: 104 MMOL/L — SIGNIFICANT CHANGE UP (ref 96–108)
CO2 SERPL-SCNC: 29 MMOL/L — SIGNIFICANT CHANGE UP (ref 22–31)
CREAT SERPL-MCNC: 0.94 MG/DL — SIGNIFICANT CHANGE UP (ref 0.5–1.3)
CRP SERPL-MCNC: 5 MG/L — HIGH (ref 0–4)
D DIMER BLD IA.RAPID-MCNC: 191 NG/ML DDU — SIGNIFICANT CHANGE UP
FERRITIN SERPL-MCNC: 341 NG/ML — SIGNIFICANT CHANGE UP (ref 30–400)
GLUCOSE SERPL-MCNC: 87 MG/DL — SIGNIFICANT CHANGE UP (ref 70–99)
HCT VFR BLD CALC: 35 % — LOW (ref 39–50)
HGB BLD-MCNC: 11.1 G/DL — LOW (ref 13–17)
MCHC RBC-ENTMCNC: 29.8 PG — SIGNIFICANT CHANGE UP (ref 27–34)
MCHC RBC-ENTMCNC: 31.7 GM/DL — LOW (ref 32–36)
MCV RBC AUTO: 94.1 FL — SIGNIFICANT CHANGE UP (ref 80–100)
NRBC # BLD: 0 /100 WBCS — SIGNIFICANT CHANGE UP (ref 0–0)
PLATELET # BLD AUTO: 307 K/UL — SIGNIFICANT CHANGE UP (ref 150–400)
POTASSIUM SERPL-MCNC: 3.5 MMOL/L — SIGNIFICANT CHANGE UP (ref 3.5–5.3)
POTASSIUM SERPL-SCNC: 3.5 MMOL/L — SIGNIFICANT CHANGE UP (ref 3.5–5.3)
RBC # BLD: 3.72 M/UL — LOW (ref 4.2–5.8)
RBC # FLD: 13.8 % — SIGNIFICANT CHANGE UP (ref 10.3–14.5)
SODIUM SERPL-SCNC: 144 MMOL/L — SIGNIFICANT CHANGE UP (ref 135–145)
WBC # BLD: 4.34 K/UL — SIGNIFICANT CHANGE UP (ref 3.8–10.5)
WBC # FLD AUTO: 4.34 K/UL — SIGNIFICANT CHANGE UP (ref 3.8–10.5)

## 2021-03-27 RX ADMIN — Medication 10 MILLILITER(S): at 12:51

## 2021-03-27 RX ADMIN — Medication 100 MILLIGRAM(S): at 05:12

## 2021-03-27 RX ADMIN — Medication 10 MILLILITER(S): at 17:25

## 2021-03-27 RX ADMIN — Medication 100 MILLIGRAM(S): at 21:32

## 2021-03-27 RX ADMIN — Medication 100 MILLIGRAM(S): at 17:25

## 2021-03-27 RX ADMIN — Medication 100 MILLIGRAM(S): at 17:02

## 2021-03-27 RX ADMIN — APIXABAN 5 MILLIGRAM(S): 2.5 TABLET, FILM COATED ORAL at 05:12

## 2021-03-27 RX ADMIN — Medication 10 MILLILITER(S): at 23:05

## 2021-03-27 RX ADMIN — APIXABAN 5 MILLIGRAM(S): 2.5 TABLET, FILM COATED ORAL at 17:25

## 2021-03-27 RX ADMIN — Medication 10 MILLILITER(S): at 05:12

## 2021-03-27 NOTE — PROGRESS NOTE ADULT - PROBLEM SELECTOR PLAN 4
back in NSR, on Metoprolol, off cardizem, digoxin was DCed
on cardizem po. BP on the low side, trend. add digoxin.  tachy resolved.   on full AC w sc Lovenox
was stable on Cardizem, RVR recurred when ambulating on Venti, will add digoxin po, will load w IV 0.25 mg. cardiology following
in afib w rvr, on Metoprolol, off cardizem, digoxin was DCed
on cardizem po. BP on the low side, trend. add digoxin.  tachy resolved.   on full AC w sc Lovenox
On Metoprolol
in afib w rvr, on Metoprolol, off cardizem, digoxin was DCed
on cardizem po. BP on the low side, trend.  tachy resolved.   on full AC w sc Lovenox
in afib w rvr, on Metoprolol, off cardizem, digoxin was DCed
on cardizem po. BP on the low side, trend. add digoxin.  tachy resolved.   on full AC w sc Lovenox
on cardizem po. BP on the low side, trend. add digoxin.  tachy resolved.   on full AC w sc Lovenox
on cardizem po. BP on the low side, trend. add digoxin. remains tachy. on full AC w sc Lovenox
in afib w rvr, on Metoprolol, off cardizem, digoxin was DCed
on cardizem po. BP on the low side, trend. add digoxin.  tachy resolved.   on full AC w sc Lovenox
on cardizem po. BP on the low side, trend. add digoxin.  tachy resolved.   on full AC w sc Lovenox
stable on Cardizem
on cardizem po. BP on the low side, back in RVR  will prob need Amio load/drip  call EPS consult  on full AC w micheline Ruizx
was stable on Cardizem, RVR recurred when ambulating on Venti, will add digoxin po, will load w IV 0.25 mg. cardiology following
in afib w rvr, on Metoprolol, off cardizem, digoxin was DCed
on cardizem po. BP on the low side, trend. add digoxin.  tachy resolved.   on full AC w sc Lovenox
in afib w rvr, on Metoprolol, off cardizem, digoxin was DCed
on cardizem po. BP on the low side, trend. add digoxin.  tachy resolved.   on full AC w sc Lovenox
stable on Cardizem

## 2021-03-27 NOTE — PROGRESS NOTE ADULT - SUBJECTIVE AND OBJECTIVE BOX
Patient is a 65y old  Male who presents with a chief complaint of acute hypoxemic respiratory failure (26 Mar 2021 13:06)      SUBJECTIVE / OVERNIGHT EVENTS:    Events noted.  CONSTITUTIONAL: No fever,  or fatigue  RESPIRATORY: On supp O2  CARDIOVASCULAR: No chest pain, palpitations,   GASTROINTESTINAL: No abdominal or epigastric pain.   NEUROLOGICAL: No headaches,     MEDICATIONS  (STANDING):  apixaban 5 milliGRAM(s) Oral every 12 hours  benzonatate 100 milliGRAM(s) Oral every 8 hours  guaifenesin/dextromethorphan  Syrup 10 milliLiter(s) Oral every 6 hours  metoprolol tartrate 100 milliGRAM(s) Oral two times a day    MEDICATIONS  (PRN):  acetaminophen   Tablet .. 650 milliGRAM(s) Oral every 6 hours PRN Mild Pain (1 - 3)        CAPILLARY BLOOD GLUCOSE        I&O's Summary    25 Mar 2021 07:01  -  26 Mar 2021 07:00  --------------------------------------------------------  IN: 960 mL / OUT: 1000 mL / NET: -40 mL    26 Mar 2021 07:01  -  26 Mar 2021 22:40  --------------------------------------------------------  IN: 500 mL / OUT: 600 mL / NET: -100 mL        T(C): 36.9 (03-26-21 @ 20:51), Max: 36.9 (03-26-21 @ 12:19)  HR: 52 (03-26-21 @ 21:42) (52 - 71)  BP: 147/85 (03-26-21 @ 20:51) (118/73 - 147/85)  RR: 18 (03-26-21 @ 20:51) (18 - 20)  SpO2: 98% (03-26-21 @ 21:42) (90% - 98%)    PHYSICAL EXAM:    NECK: Supple, No JVD  CHEST/LUNG: Clear to auscultation bilaterally; No wheezing.  HEART: Regular rate and rhythm; No murmurs, rubs, or gallops  ABDOMEN: Soft, Nontender, Nondistended; Bowel sounds present  EXTREMITIES:   No edema  NEUROLOGY: AAO X 3      LABS:                        10.7   4.29  )-----------( 287      ( 26 Mar 2021 07:15 )             34.5     03-26    140  |  102  |  11  ----------------------------<  77  4.0   |  27  |  1.02    Ca    8.8      26 Mar 2021 07:13    TPro  6.2  /  Alb  2.9<L>  /  TBili  0.3  /  DBili  x   /  AST  16  /  ALT  20  /  AlkPhos  48  03-25            CAPILLARY BLOOD GLUCOSE            RADIOLOGY & ADDITIONAL TESTS:    Imaging Personally Reviewed:    Consultant(s) Notes Reviewed:      Care Discussed with Consultants/Other Providers:    Rosendo Bhatti MD, CMD, FACP    [address] [place] [address]  Office Tel: [phone]  Cell: [phone]

## 2021-03-28 LAB
ANION GAP SERPL CALC-SCNC: 10 MMOL/L — SIGNIFICANT CHANGE UP (ref 5–17)
BUN SERPL-MCNC: 7 MG/DL — SIGNIFICANT CHANGE UP (ref 7–23)
CALCIUM SERPL-MCNC: 8.8 MG/DL — SIGNIFICANT CHANGE UP (ref 8.4–10.5)
CHLORIDE SERPL-SCNC: 103 MMOL/L — SIGNIFICANT CHANGE UP (ref 96–108)
CO2 SERPL-SCNC: 30 MMOL/L — SIGNIFICANT CHANGE UP (ref 22–31)
CREAT SERPL-MCNC: 1 MG/DL — SIGNIFICANT CHANGE UP (ref 0.5–1.3)
GLUCOSE SERPL-MCNC: 83 MG/DL — SIGNIFICANT CHANGE UP (ref 70–99)
HCT VFR BLD CALC: 33.4 % — LOW (ref 39–50)
HGB BLD-MCNC: 10.4 G/DL — LOW (ref 13–17)
MCHC RBC-ENTMCNC: 29.2 PG — SIGNIFICANT CHANGE UP (ref 27–34)
MCHC RBC-ENTMCNC: 31.1 GM/DL — LOW (ref 32–36)
MCV RBC AUTO: 93.8 FL — SIGNIFICANT CHANGE UP (ref 80–100)
NRBC # BLD: 0 /100 WBCS — SIGNIFICANT CHANGE UP (ref 0–0)
PLATELET # BLD AUTO: 340 K/UL — SIGNIFICANT CHANGE UP (ref 150–400)
POTASSIUM SERPL-MCNC: 3.5 MMOL/L — SIGNIFICANT CHANGE UP (ref 3.5–5.3)
POTASSIUM SERPL-SCNC: 3.5 MMOL/L — SIGNIFICANT CHANGE UP (ref 3.5–5.3)
RBC # BLD: 3.56 M/UL — LOW (ref 4.2–5.8)
RBC # FLD: 13.9 % — SIGNIFICANT CHANGE UP (ref 10.3–14.5)
SODIUM SERPL-SCNC: 143 MMOL/L — SIGNIFICANT CHANGE UP (ref 135–145)
WBC # BLD: 4.37 K/UL — SIGNIFICANT CHANGE UP (ref 3.8–10.5)
WBC # FLD AUTO: 4.37 K/UL — SIGNIFICANT CHANGE UP (ref 3.8–10.5)

## 2021-03-28 RX ADMIN — Medication 100 MILLIGRAM(S): at 17:26

## 2021-03-28 RX ADMIN — Medication 100 MILLIGRAM(S): at 21:29

## 2021-03-28 RX ADMIN — Medication 10 MILLILITER(S): at 23:13

## 2021-03-28 RX ADMIN — Medication 100 MILLIGRAM(S): at 05:36

## 2021-03-28 RX ADMIN — Medication 10 MILLILITER(S): at 11:06

## 2021-03-28 RX ADMIN — Medication 10 MILLILITER(S): at 05:37

## 2021-03-28 RX ADMIN — APIXABAN 5 MILLIGRAM(S): 2.5 TABLET, FILM COATED ORAL at 05:37

## 2021-03-28 RX ADMIN — APIXABAN 5 MILLIGRAM(S): 2.5 TABLET, FILM COATED ORAL at 17:26

## 2021-03-28 RX ADMIN — Medication 10 MILLILITER(S): at 17:26

## 2021-03-28 NOTE — PROGRESS NOTE ADULT - ASSESSMENT
The patient is a 65y Male, hx of morbid obesity, HTN on lisinopril, complaining of shortness of breath. covid + dx 5d prior. no home O2 requirement, former smoker. has dyspnea at rest and on exertion. has not taken steroids, no hospitalization for covid. denies CP. [facility]. (27 Feb 2021 22:29)    ER vital:  T 98.4, P 178, /85.  Pt satting 85% on RA, placed on nasal cannula, advanced to NRB.  Covid pcr (+), covid serologies (-).   CT angio chest (-) for PE, (+) b/l patchy opacities.  Pt with elevated AST (>5x ULN but <10x) and ALT.   Pt started on remdesivir and dexamethasone.     Acute covid illness:    - Cont remdesivir and dexamethasone.  Monitor daily LFTs and renal function while pt on remdesivir.  LFTs elevated, possibly secondary to viral infection.  Currently ALT < 10x ULN, can cont remdesivir.  LFTs slowly improving.   Pt completed remdesivir and dexa.     - Monitor pulse ox, pt on supplemental O2 - transitioned to 4L nc    - Monitor inflammatory markers q72    DD:  192 <-- 272 <-- 359 <-- 248 <-- 213 <-- 282 <-- 343  ferritin: 666 <-- 692 <-- 1468 <-- 1636  pct:  0.58  crp: 1.28 <-- 3.0 <-- 2.2 <-- 10.3 <-- 10.9    - Pt now on apixiban for dvt ppx.  CT angio chest (-) for PE.   LE dopplers neg DVT    *  Repeat cxr 3/8 with continued b/l patchy opacities with slight worsening.  No indication for abx at this time.  Pt on VM mask.  Repeat covid swab sent 3/16 positive.      * Oxygenation improving, now on 3L nc.  Repeat cxr 3/22 with b/l patchy opacities c/w covid pna.   Cont present managment.  Wean O2 as tolerated.       Dr. Lynn Chambers/[facility] covering 3/29 through 4/4.  [phone]

## 2021-03-28 NOTE — PROGRESS NOTE ADULT - ASSESSMENT
65y Male, hx of morbid obesity, HTN on lisinopril, complaining of shortness of breath. covid + dx 5d prior. no home O2 requirement, former smoker. has dyspnea at rest and on exertion. has not taken steroids, no hospitalization for covid. denies CP. [facility].    Problem/Plan - 1:  ·  Problem: Atrial fibrillation with RVR.  -stable, rate controlled  -continue metoprolol 100 bid   -cont a/c with eliquis 5 bid.     Problem/Plan - 2:  ·  Problem: COVID-19.  Plan: Completed Remdesivir and decadron   -tx/management per med  -cont a/c      Problem/Plan - 3:  ·  Problem: HTN (hypertension).  Plan: Stable.     35 minutes spent on total encounter; more than 50% of the visit was spent counseling and/or coordinating care by the attending physician.

## 2021-03-28 NOTE — PROGRESS NOTE ADULT - SUBJECTIVE AND OBJECTIVE BOX
Infectious Diseases progress note:    Subjective: No acute events.  Satting well on 3L nc    ROS:  CONSTITUTIONAL:  No fever, chills, rigors  CARDIOVASCULAR:  No chest pain or palpitations  RESPIRATORY:   No SOB, cough, dyspnea on exertion.  No wheezing  GASTROINTESTINAL:  No abd pain, N/V, diarrhea/constipation  EXTREMITIES:  No swelling or joint pain  GENITOURINARY:  No burning on urination, increased frequency or urgency.  No flank pain  NEUROLOGIC:  No HA, visual disturbances  SKIN: No rashes    Allergies    Actifed (Headache)  Sudafed (Headache)    Intolerances        ANTIBIOTICS/RELEVANT:  antimicrobials    immunologic:    OTHER:  acetaminophen   Tablet .. 650 milliGRAM(s) Oral every 6 hours PRN  apixaban 5 milliGRAM(s) Oral every 12 hours  benzonatate 100 milliGRAM(s) Oral every 8 hours  guaifenesin/dextromethorphan  Syrup 10 milliLiter(s) Oral every 6 hours  metoprolol tartrate 100 milliGRAM(s) Oral two times a day      Objective:  Vital Signs Last 24 Hrs  T(C): 36.8 (28 Mar 2021 11:29), Max: 36.8 (28 Mar 2021 05:29)  T(F): 98.3 (28 Mar 2021 11:29), Max: 98.3 (28 Mar 2021 05:29)  HR: 77 (28 Mar 2021 15:15) (58 - 81)  BP: 113/70 (28 Mar 2021 11:29) (113/70 - 131/78)  BP(mean): --  RR: 18 (28 Mar 2021 11:29) (18 - 19)  SpO2: 95% (28 Mar 2021 15:15) (92% - 97%)    PHYSICAL EXAM:  Constitutional:NAD  Eyes:KRISTEN, EOMI  Ear/Nose/Throat: no thrush, mucositis.  Moist mucous membranes	  Neck:no JVD, no lymphadenopathy, supple  Respiratory: CTA berna  Cardiovascular: S1S2 RRR, no murmurs  Gastrointestinal:soft, nontender,  nondistended (+) BS  Extremities:no e/e/c  Skin:  no rashes, open wounds or ulcerations        LABS:                        10.4   4.37  )-----------( 340      ( 28 Mar 2021 06:52 )             33.4     03-28    143  |  103  |  7   ----------------------------<  83  3.5   |  30  |  1.00    Ca    8.8      28 Mar 2021 06:50            Procalcitonin, Serum: 0.58 (02-27 @ 13:25)                    MICROBIOLOGY:          RADIOLOGY & ADDITIONAL STUDIES:

## 2021-03-28 NOTE — PROGRESS NOTE ADULT - SUBJECTIVE AND OBJECTIVE BOX
CARDIOLOGY FOLLOW UP NOTE - DR. BREWSTER (for Dr Moctezuma)    Patient Name: SANA MALCOLM  Date of Service: 03-28-21     Subjective:  events noted  denies chest pain, inc dyspnea, palpitations, dizziness  ROS: otherwise negative   overnight events:      PHYSICAL EXAM:  T(C): 36.8 (03-28-21 @ 11:29), Max: 36.8 (03-28-21 @ 05:29)  HR: 81 (03-28-21 @ 11:29) (58 - 82)  BP: 113/70 (03-28-21 @ 11:29) (113/70 - 138/80)  RR: 18 (03-28-21 @ 11:29) (18 - 19)  SpO2: 92% (03-28-21 @ 11:29) (92% - 97%)  Wt(kg): --  I&O's Summary    27 Mar 2021 07:01  -  28 Mar 2021 07:00  --------------------------------------------------------  IN: 150 mL / OUT: 1700 mL / NET: -1550 mL      Daily     Daily     Home Medications:      MEDICATIONS  (STANDING):  apixaban 5 milliGRAM(s) Oral every 12 hours  benzonatate 100 milliGRAM(s) Oral every 8 hours  guaifenesin/dextromethorphan  Syrup 10 milliLiter(s) Oral every 6 hours  metoprolol tartrate 100 milliGRAM(s) Oral two times a day      TELEMETRY: 	    ECG:  	  RADIOLOGY:   DIAGNOSTIC TESTING:  [ ] Echocardiogram:  [ ] Catheterization:  [ ] Stress Test:    OTHER: 	    LABS:	 	    CARDIAC MARKERS:                                10.4   4.37  )-----------( 340      ( 28 Mar 2021 06:52 )             33.4     03-28    143  |  103  |  7   ----------------------------<  83  3.5   |  30  |  1.00    Ca    8.8      28 Mar 2021 06:50      proBNP:     Lipid Profile:   HgA1c:     Creatinine, Serum: 1.00 mg/dL (03-28-21 @ 06:50)  Creatinine, Serum: 0.94 mg/dL (03-27-21 @ 06:27)  Creatinine, Serum: 1.02 mg/dL (03-26-21 @ 07:13)

## 2021-03-28 NOTE — PROGRESS NOTE ADULT - SUBJECTIVE AND OBJECTIVE BOX
Patient is a 65y old  Male who presents with a chief complaint of acute hypoxemic respiratory failure (28 Mar 2021 18:25)      SUBJECTIVE / OVERNIGHT EVENTS:    Events noted.  CONSTITUTIONAL: No fever,  or fatigue  RESPIRATORY: On supp O2  CARDIOVASCULAR: No chest pain, palpitations, dizziness, or leg swelling  GASTROINTESTINAL: No abdominal or epigastric pain.   NEUROLOGICAL: No headaches,     MEDICATIONS  (STANDING):  apixaban 5 milliGRAM(s) Oral every 12 hours  benzonatate 100 milliGRAM(s) Oral every 8 hours  guaifenesin/dextromethorphan  Syrup 10 milliLiter(s) Oral every 6 hours  metoprolol tartrate 100 milliGRAM(s) Oral two times a day    MEDICATIONS  (PRN):  acetaminophen   Tablet .. 650 milliGRAM(s) Oral every 6 hours PRN Mild Pain (1 - 3)        CAPILLARY BLOOD GLUCOSE        I&O's Summary    27 Mar 2021 07:01  -  28 Mar 2021 07:00  --------------------------------------------------------  IN: 150 mL / OUT: 1700 mL / NET: -1550 mL    28 Mar 2021 07:01  -  28 Mar 2021 22:45  --------------------------------------------------------  IN: 20 mL / OUT: 0 mL / NET: 20 mL        T(C): 37.1 (03-28-21 @ 21:19), Max: 37.1 (03-28-21 @ 21:19)  HR: 59 (03-28-21 @ 21:19) (58 - 81)  BP: 123/80 (03-28-21 @ 21:19) (113/70 - 131/78)  RR: 18 (03-28-21 @ 21:19) (18 - 19)  SpO2: 96% (03-28-21 @ 21:19) (92% - 97%)    PHYSICAL EXAM:  GENERAL: NAD  NECK: Supple, No JVD  CHEST/LUNG: Clear to auscultation bilaterally; No wheezing.  HEART: Regular rate and rhythm; No murmurs, rubs, or gallops  ABDOMEN: Soft, Nontender, Nondistended; Bowel sounds present  EXTREMITIES:   No edema  NEUROLOGY: AAO X 3      LABS:                        10.4   4.37  )-----------( 340      ( 28 Mar 2021 06:52 )             33.4     03-28    143  |  103  |  7   ----------------------------<  83  3.5   |  30  |  1.00    Ca    8.8      28 Mar 2021 06:50              CAPILLARY BLOOD GLUCOSE            RADIOLOGY & ADDITIONAL TESTS:    Imaging Personally Reviewed:    Consultant(s) Notes Reviewed:      Care Discussed with Consultants/Other Providers:    Rosendo Bhatti MD, CMD, FACP    [address] [place] [address]  Office Tel: [phone]  Cell: [phone]

## 2021-03-29 RX ADMIN — Medication 100 MILLIGRAM(S): at 21:03

## 2021-03-29 RX ADMIN — APIXABAN 5 MILLIGRAM(S): 2.5 TABLET, FILM COATED ORAL at 17:33

## 2021-03-29 RX ADMIN — Medication 100 MILLIGRAM(S): at 05:31

## 2021-03-29 RX ADMIN — APIXABAN 5 MILLIGRAM(S): 2.5 TABLET, FILM COATED ORAL at 05:31

## 2021-03-29 RX ADMIN — Medication 10 MILLILITER(S): at 23:00

## 2021-03-29 RX ADMIN — Medication 10 MILLILITER(S): at 05:31

## 2021-03-29 RX ADMIN — Medication 10 MILLILITER(S): at 17:33

## 2021-03-29 RX ADMIN — Medication 10 MILLILITER(S): at 12:21

## 2021-03-29 RX ADMIN — Medication 100 MILLIGRAM(S): at 14:10

## 2021-03-29 RX ADMIN — Medication 100 MILLIGRAM(S): at 17:42

## 2021-03-29 NOTE — PROGRESS NOTE ADULT - SUBJECTIVE AND OBJECTIVE BOX
Follow-up Pulm Progress Note  Francisco Ramon MD  [phone]    Nasal oxygen tapered to 3 liters with rest sats > 90% and unchanged desaturation with activity  Otherwise stable      Vital Signs Last 24 Hrs  T(C): 36.9 (26 Mar 2021 12:19), Max: 36.9 (26 Mar 2021 12:19)  T(F): 98.5 (26 Mar 2021 12:19), Max: 98.5 (26 Mar 2021 12:19)  HR: 64 (26 Mar 2021 12:19) (60 - 71)  BP: 118/73 (26 Mar 2021 12:19) (118/73 - 143/88)  BP(mean): --  RR: 18 (26 Mar 2021 12:19) (18 - 20)  SpO2: 90% (26 Mar 2021 12:19) (82% - 97%)                        10.7   4.29  )-----------( 287      ( 26 Mar 2021 07:15 )             34.5     03-26    140  |  102  |  11  ----------------------------<  77  4.0   |  27  |  1.02    Ca    8.8      26 Mar 2021 07:13    TPro  6.2  /  Alb  2.9<L>  /  TBili  0.3  /  DBili  x   /  AST  16  /  ALT  20  /  AlkPhos  48  03-25    Physical Examination:  PULM: No wheeze  CVS: Regular rate and rhythm, no murmurs, rubs, or gallops  ABD: Soft, non-tender  EXT:  No clubbing, cyanosis, or edema    RADIOLOGY REVIEWED  CXR:    CT chest:    PROCEDURE DATE:  02/27/2021        INTERPRETATION:  CLINICAL INFORMATION: Hypoxia, Covid 19 infection, assess for pulmonary embolism    COMPARISON: Chest radiograph from same day    PROCEDURE:  CT Angiography of the Chest.  90 ml of Omnipaque 350 was injected intravenously. 10 ml were discarded.  Sagittal and coronal reformats were performed as well as 3D (MIP) reconstructions.    FINDINGS:    LUNGS AND AIRWAYS: Patent central airways.  Bilateral patchy groundglass and consolidative opacities with peripheral and lower lobe predominance.  PLEURA: No pleural effusion.  MEDIASTINUM AND KASIE: No lymphadenopathy.  VESSELS: No central, main, lobar, or segmental pulmonary embolism. Evaluation of the subsegmental pulmonary arteries is limited/nondiagnostic secondary to respiratory motion.  HEART: Heart size is normal. No pericardial effusion.  CHEST WALL AND LOWER NECK: Prominent right upper paratracheal lymph node may be reactive  VISUALIZED UPPER ABDOMEN: Small hiatal hernia.  BONES: Spinal degenerative changes.    IMPRESSION:  No central, main, lobar, or segmental pulmonary embolism. Evaluation of the subsegmental pulmonary arteries is limited/nondiagnostic secondary to respiratory motion.    Bilateral patchy groundglass and consolidative opacities with peripheral and lower lobe predominance, compatible with patient's known Covid 19 infection. Follow-up to resolution recommended.    TTE:

## 2021-03-29 NOTE — PROGRESS NOTE ADULT - SUBJECTIVE AND OBJECTIVE BOX
Subjective: Patient seen and examined. No new events except as noted.     REVIEW OF SYSTEMS:    CONSTITUTIONAL: + weakness, fevers or chills  EYES/ENT: No visual changes;  No vertigo or throat pain   NECK: No pain or stiffness  RESPIRATORY: No cough, wheezing, hemoptysis; No shortness of breath  CARDIOVASCULAR: No chest pain or palpitations  GASTROINTESTINAL: No abdominal or epigastric pain. No nausea, vomiting, or hematemesis; No diarrhea or constipation. No melena or hematochezia.  GENITOURINARY: No dysuria, frequency or hematuria  NEUROLOGICAL: No numbness or weakness  SKIN: No itching, burning, rashes, or lesions   All other review of systems is negative unless indicated above.    MEDICATIONS:  MEDICATIONS  (STANDING):  apixaban 5 milliGRAM(s) Oral every 12 hours  benzonatate 100 milliGRAM(s) Oral every 8 hours  guaifenesin/dextromethorphan  Syrup 10 milliLiter(s) Oral every 6 hours  metoprolol tartrate 100 milliGRAM(s) Oral two times a day      PHYSICAL EXAM:  T(C): 36.8 (03-29-21 @ 20:46), Max: 37.2 (03-29-21 @ 11:28)  HR: 58 (03-29-21 @ 20:46) (58 - 81)  BP: 114/75 (03-29-21 @ 20:46) (113/77 - 123/75)  RR: 18 (03-29-21 @ 20:46) (18 - 18)  SpO2: 93% (03-29-21 @ 20:46) (92% - 98%)  Wt(kg): --  I&O's Summary    28 Mar 2021 07:01  -  29 Mar 2021 07:00  --------------------------------------------------------  IN: 70 mL / OUT: 650 mL / NET: -580 mL    29 Mar 2021 07:01  -  29 Mar 2021 22:21  --------------------------------------------------------  IN: 0 mL / OUT: 675 mL / NET: -675 mL          Appearance: NAD  HEENT:   Dry  oral mucosa, PERRL, EOMI	  Lymphatic: No lymphadenopathy , no edema  Cardiovascular: Normal S1 S2, No JVD, No murmurs , Peripheral pulses palpable 2+ bilaterally  Respiratory: Lungs clear to auscultation, normal effort 	  Gastrointestinal:  Soft, Non-tender, + BS	  Skin: No rashes, No ecchymoses, No cyanosis, warm to touch  Musculoskeletal: Normal range of motion, normal strength  Psychiatry:  Mood & affect appropriate  Ext: No edema      LABS:    CARDIAC MARKERS:                                10.4   4.37  )-----------( 340      ( 28 Mar 2021 06:52 )             33.4     03-28    143  |  103  |  7   ----------------------------<  83  3.5   |  30  |  1.00    Ca    8.8      28 Mar 2021 06:50      proBNP:   Lipid Profile:   HgA1c:   TSH:             TELEMETRY: 	    ECG:  	  RADIOLOGY:   DIAGNOSTIC TESTING:  [ ] Echocardiogram:  [ ]  Catheterization:  [ ] Stress Test:    OTHER:

## 2021-03-29 NOTE — PROGRESS NOTE ADULT - ASSESSMENT
ACute hypoxemic respiratory failure  Obesity  Multilobar viral pneumonia due to COVID 19  Atrial Fibrillation: now back in NSR  Refractory hypoxemia: multifactorial - residual COVID, obesity with basilar atelectasis  CTA 2/27 negative PE and LE dopplers negative 3/3    REC    Continue nocturnal CPAP 8 and prn day if sleeping  Transfer to non covid floor pending availability  Continue 3 liters nasal cannula: taper as tolerated and re-assess ambulatory sats on O2  DC planning

## 2021-03-29 NOTE — PROGRESS NOTE ADULT - SUBJECTIVE AND OBJECTIVE BOX
Patient is a 65y old  Male who presents with a chief complaint of acute hypoxemic respiratory failure (29 Mar 2021 22:21)      SUBJECTIVE / OVERNIGHT EVENTS:    Events noted.  CONSTITUTIONAL: No fever,  or fatigue  RESPIRATORY:  On supp O2  CARDIOVASCULAR: No chest pain, palpitations, dizziness, or leg swelling  GASTROINTESTINAL: No abdominal or epigastric pain. No nausea, vomiting.  NEUROLOGICAL: No headaches,     MEDICATIONS  (STANDING):  apixaban 5 milliGRAM(s) Oral every 12 hours  benzonatate 100 milliGRAM(s) Oral every 8 hours  guaifenesin/dextromethorphan  Syrup 10 milliLiter(s) Oral every 6 hours  metoprolol tartrate 100 milliGRAM(s) Oral two times a day    MEDICATIONS  (PRN):  acetaminophen   Tablet .. 650 milliGRAM(s) Oral every 6 hours PRN Mild Pain (1 - 3)        CAPILLARY BLOOD GLUCOSE        I&O's Summary    28 Mar 2021 07:01  -  29 Mar 2021 07:00  --------------------------------------------------------  IN: 70 mL / OUT: 650 mL / NET: -580 mL    29 Mar 2021 07:01  -  30 Mar 2021 00:10  --------------------------------------------------------  IN: 0 mL / OUT: 1025 mL / NET: -1025 mL        T(C): 36.8 (03-29-21 @ 20:46), Max: 37.2 (03-29-21 @ 11:28)  HR: 63 (03-29-21 @ 21:22) (58 - 81)  BP: 114/75 (03-29-21 @ 20:46) (113/77 - 123/75)  RR: 18 (03-29-21 @ 20:46) (18 - 18)  SpO2: 96% (03-29-21 @ 21:22) (92% - 98%)    PHYSICAL EXAM:  GENERAL: NAD  NECK: Supple, No JVD  CHEST/LUNG: Clear to auscultation bilaterally; No wheezing.  HEART: Regular rate and rhythm; No murmurs, rubs, or gallops  ABDOMEN: Soft, Nontender, Nondistended; Bowel sounds present  EXTREMITIES:   No edema  NEUROLOGY: AAO X 3      LABS:                        10.4   4.37  )-----------( 340      ( 28 Mar 2021 06:52 )             33.4     03-28    143  |  103  |  7   ----------------------------<  83  3.5   |  30  |  1.00    Ca    8.8      28 Mar 2021 06:50              CAPILLARY BLOOD GLUCOSE            RADIOLOGY & ADDITIONAL TESTS:    Imaging Personally Reviewed:    Consultant(s) Notes Reviewed:      Care Discussed with Consultants/Other Providers:    Rosendo Bhatti MD, CMD, FACP    [address] [place] [address]  Office Tel: [phone]  Cell: [phone]

## 2021-03-30 LAB
ALBUMIN SERPL ELPH-MCNC: 2.8 G/DL — LOW (ref 3.3–5)
ALP SERPL-CCNC: 52 U/L — SIGNIFICANT CHANGE UP (ref 40–120)
ALT FLD-CCNC: 11 U/L — SIGNIFICANT CHANGE UP (ref 10–45)
ANION GAP SERPL CALC-SCNC: 9 MMOL/L — SIGNIFICANT CHANGE UP (ref 5–17)
AST SERPL-CCNC: 15 U/L — SIGNIFICANT CHANGE UP (ref 10–40)
BILIRUB SERPL-MCNC: 0.3 MG/DL — SIGNIFICANT CHANGE UP (ref 0.2–1.2)
BUN SERPL-MCNC: 7 MG/DL — SIGNIFICANT CHANGE UP (ref 7–23)
CALCIUM SERPL-MCNC: 8.7 MG/DL — SIGNIFICANT CHANGE UP (ref 8.4–10.5)
CHLORIDE SERPL-SCNC: 105 MMOL/L — SIGNIFICANT CHANGE UP (ref 96–108)
CO2 SERPL-SCNC: 29 MMOL/L — SIGNIFICANT CHANGE UP (ref 22–31)
CREAT SERPL-MCNC: 1 MG/DL — SIGNIFICANT CHANGE UP (ref 0.5–1.3)
GLUCOSE SERPL-MCNC: 86 MG/DL — SIGNIFICANT CHANGE UP (ref 70–99)
HCT VFR BLD CALC: 32.5 % — LOW (ref 39–50)
HGB BLD-MCNC: 10.2 G/DL — LOW (ref 13–17)
MCHC RBC-ENTMCNC: 29.1 PG — SIGNIFICANT CHANGE UP (ref 27–34)
MCHC RBC-ENTMCNC: 31.4 GM/DL — LOW (ref 32–36)
MCV RBC AUTO: 92.9 FL — SIGNIFICANT CHANGE UP (ref 80–100)
NRBC # BLD: 0 /100 WBCS — SIGNIFICANT CHANGE UP (ref 0–0)
PLATELET # BLD AUTO: 348 K/UL — SIGNIFICANT CHANGE UP (ref 150–400)
POTASSIUM SERPL-MCNC: 3.6 MMOL/L — SIGNIFICANT CHANGE UP (ref 3.5–5.3)
POTASSIUM SERPL-SCNC: 3.6 MMOL/L — SIGNIFICANT CHANGE UP (ref 3.5–5.3)
PROT SERPL-MCNC: 5.9 G/DL — LOW (ref 6–8.3)
RBC # BLD: 3.5 M/UL — LOW (ref 4.2–5.8)
RBC # FLD: 13.9 % — SIGNIFICANT CHANGE UP (ref 10.3–14.5)
SODIUM SERPL-SCNC: 143 MMOL/L — SIGNIFICANT CHANGE UP (ref 135–145)
WBC # BLD: 4.62 K/UL — SIGNIFICANT CHANGE UP (ref 3.8–10.5)
WBC # FLD AUTO: 4.62 K/UL — SIGNIFICANT CHANGE UP (ref 3.8–10.5)

## 2021-03-30 RX ADMIN — Medication 10 MILLILITER(S): at 11:17

## 2021-03-30 RX ADMIN — Medication 10 MILLILITER(S): at 05:27

## 2021-03-30 RX ADMIN — Medication 10 MILLILITER(S): at 22:02

## 2021-03-30 RX ADMIN — Medication 10 MILLILITER(S): at 17:59

## 2021-03-30 RX ADMIN — APIXABAN 5 MILLIGRAM(S): 2.5 TABLET, FILM COATED ORAL at 05:27

## 2021-03-30 RX ADMIN — Medication 100 MILLIGRAM(S): at 17:59

## 2021-03-30 RX ADMIN — Medication 100 MILLIGRAM(S): at 05:27

## 2021-03-30 RX ADMIN — Medication 100 MILLIGRAM(S): at 22:02

## 2021-03-30 RX ADMIN — APIXABAN 5 MILLIGRAM(S): 2.5 TABLET, FILM COATED ORAL at 17:59

## 2021-03-30 RX ADMIN — Medication 100 MILLIGRAM(S): at 13:23

## 2021-03-30 NOTE — PROGRESS NOTE ADULT - PROBLEM SELECTOR PROBLEM 2
Acute respiratory failure due to COVID-19
Acute respiratory failure due to COVID-19
COVID-19
Hypertension, unspecified type
COVID-19
COVID-19
Hypertension, unspecified type
Hypertension, unspecified type
COVID-19
Hypertension, unspecified type
COVID-19
Hypertension, unspecified type
Hypertension, unspecified type
COVID-19
Hypertension, unspecified type
Acute respiratory failure due to COVID-19
COVID-19
Hypertension, unspecified type
COVID-19
Hypertension, unspecified type
COVID-19
COVID-19
Hypertension, unspecified type
COVID-19
Hypertension, unspecified type
Hypertension, unspecified type
COVID-19
Hypertension, unspecified type
COVID-19
Hypertension, unspecified type

## 2021-03-30 NOTE — PROGRESS NOTE ADULT - SUBJECTIVE AND OBJECTIVE BOX
Subjective: Patient seen and examined. No new events except as noted.     REVIEW OF SYSTEMS:    CONSTITUTIONAL: + weakness, fevers or chills  EYES/ENT: No visual changes;  No vertigo or throat pain   NECK: No pain or stiffness  RESPIRATORY: No cough, wheezing, hemoptysis; No shortness of breath  CARDIOVASCULAR: No chest pain or palpitations  GASTROINTESTINAL: No abdominal or epigastric pain. No nausea, vomiting, or hematemesis; No diarrhea or constipation. No melena or hematochezia.  GENITOURINARY: No dysuria, frequency or hematuria  NEUROLOGICAL: No numbness or weakness  SKIN: No itching, burning, rashes, or lesions   All other review of systems is negative unless indicated above.    MEDICATIONS:  MEDICATIONS  (STANDING):  apixaban 5 milliGRAM(s) Oral every 12 hours  benzonatate 100 milliGRAM(s) Oral every 8 hours  guaifenesin/dextromethorphan  Syrup 10 milliLiter(s) Oral every 6 hours  metoprolol tartrate 100 milliGRAM(s) Oral two times a day      PHYSICAL EXAM:  T(C): 36.6 (03-30-21 @ 04:54), Max: 37.2 (03-29-21 @ 11:28)  HR: 66 (03-30-21 @ 10:12) (58 - 75)  BP: 123/81 (03-30-21 @ 04:54) (113/77 - 123/81)  RR: 18 (03-30-21 @ 04:54) (18 - 18)  SpO2: 93% (03-30-21 @ 10:12) (92% - 98%)  Wt(kg): --  I&O's Summary    29 Mar 2021 07:01  -  30 Mar 2021 07:00  --------------------------------------------------------  IN: 0 mL / OUT: 1225 mL / NET: -1225 mL          Appearance: NAD  HEENT:   Dry oral mucosa, PERRL, EOMI	  Lymphatic: No lymphadenopathy , no edema  Cardiovascular: Normal S1 S2, No JVD, No murmurs , Peripheral pulses palpable 2+ bilaterally  Respiratory: Decreased bs  	  Gastrointestinal:  Soft, Non-tender, + BS	  Skin: No rashes, No ecchymoses, No cyanosis, warm to touch  Musculoskeletal: Normal range of motion, normal strength  Psychiatry:  Mood & affect appropriate  Ext: No edema      LABS:    CARDIAC MARKERS:                                10.2   4.62  )-----------( 348      ( 30 Mar 2021 06:52 )             32.5     03-30    143  |  105  |  7   ----------------------------<  86  3.6   |  29  |  1.00    Ca    8.7      30 Mar 2021 06:52    TPro  5.9<L>  /  Alb  2.8<L>  /  TBili  0.3  /  DBili  x   /  AST  15  /  ALT  11  /  AlkPhos  52  03-30    proBNP:   Lipid Profile:   HgA1c:   TSH:             TELEMETRY: 	    ECG:  	  RADIOLOGY:   DIAGNOSTIC TESTING:  [ ] Echocardiogram:  [ ]  Catheterization:  [ ] Stress Test:    OTHER:

## 2021-03-30 NOTE — PROGRESS NOTE ADULT - PROBLEM SELECTOR PROBLEM 3
Acute respiratory failure due to COVID-19
HTN (hypertension)
Acute respiratory failure due to COVID-19
HTN (hypertension)
HTN (hypertension)
Acute respiratory failure due to COVID-19
HTN (hypertension)
HTN (hypertension)
Acute respiratory failure due to COVID-19
Atrial fibrillation with RVR
HTN (hypertension)
Acute respiratory failure due to COVID-19
HTN (hypertension)
HTN (hypertension)
Atrial fibrillation with RVR
HTN (hypertension)
Acute respiratory failure due to COVID-19
Acute respiratory failure due to COVID-19
Atrial fibrillation with RVR
HTN (hypertension)
HTN (hypertension)
Acute respiratory failure due to COVID-19
HTN (hypertension)
Acute respiratory failure due to COVID-19
HTN (hypertension)
Acute respiratory failure due to COVID-19
HTN (hypertension)
HTN (hypertension)
Acute respiratory failure due to COVID-19
HTN (hypertension)
Acute respiratory failure due to COVID-19
Acute respiratory failure due to COVID-19
HTN (hypertension)
Acute respiratory failure due to COVID-19

## 2021-03-30 NOTE — PROGRESS NOTE ADULT - ASSESSMENT
ACute hypoxemic respiratory failure  Obesity  Multilobar viral pneumonia due to COVID 19  Atrial Fibrillation: now back in NSR  Refractory hypoxemia: multifactorial - residual COVID, obesity with basilar atelectasis  CTA 2/27 negative PE and LE dopplers negative 3/3    REC    Continue nocturnal CPAP 8 and prn day if sleeping  Monitor sats on 2 liters nasal and room air: at this time will need home O2  DC planning

## 2021-03-30 NOTE — PROGRESS NOTE ADULT - SUBJECTIVE AND OBJECTIVE BOX
Patient is a 65y old  Male who presents with a chief complaint of acute hypoxemic respiratory failure (30 Mar 2021 14:52)      SUBJECTIVE / OVERNIGHT EVENTS:    MEDICATIONS  (STANDING):  apixaban 5 milliGRAM(s) Oral every 12 hours  benzonatate 100 milliGRAM(s) Oral every 8 hours  guaifenesin/dextromethorphan  Syrup 10 milliLiter(s) Oral every 6 hours  metoprolol tartrate 100 milliGRAM(s) Oral two times a day    MEDICATIONS  (PRN):  acetaminophen   Tablet .. 650 milliGRAM(s) Oral every 6 hours PRN Mild Pain (1 - 3)      Vital Signs Last 24 Hrs  T(F): 98.9 (03-30-21 @ 21:08), Max: 98.9 (03-30-21 @ 21:08)  HR: 60 (03-30-21 @ 21:08) (60 - 79)  BP: 116/70 (03-30-21 @ 21:08) (111/70 - 123/81)  RR: 18 (03-30-21 @ 21:08) (18 - 18)  SpO2: 91% (03-30-21 @ 21:08) (90% - 98%)  Telemetry:   CAPILLARY BLOOD GLUCOSE        I&O's Summary    29 Mar 2021 07:01  -  30 Mar 2021 07:00  --------------------------------------------------------  IN: 0 mL / OUT: 1225 mL / NET: -1225 mL        PHYSICAL EXAM:  GENERAL: NAD, well-developed  HEAD:  Atraumatic, Normocephalic  EYES: EOMI, PERRLA, conjunctiva and sclera clear  NECK: Supple, No JVD  CHEST/LUNG: Clear to auscultation bilaterally; No wheeze  HEART: Regular rate and rhythm; No murmurs, rubs, or gallops  ABDOMEN: Soft, Nontender, Nondistended; Bowel sounds present  EXTREMITIES:  2+ Peripheral Pulses, No clubbing, cyanosis, or edema  PSYCH: AAOx3  NEUROLOGY: non-focal  SKIN: No rashes or lesions    LABS:                        10.2   4.62  )-----------( 348      ( 30 Mar 2021 06:52 )             32.5     03-30    143  |  105  |  7   ----------------------------<  86  3.6   |  29  |  1.00    Ca    8.7      30 Mar 2021 06:52    TPro  5.9<L>  /  Alb  2.8<L>  /  TBili  0.3  /  DBili  x   /  AST  15  /  ALT  11  /  AlkPhos  52  03-30              RADIOLOGY & ADDITIONAL TESTS:    Imaging Personally Reviewed:    Consultant(s) Notes Reviewed:      Care Discussed with Consultants/Other Providers:

## 2021-03-30 NOTE — PROGRESS NOTE ADULT - SUBJECTIVE AND OBJECTIVE BOX
Follow-up Pulm Progress Note  Francisco Ramon MD  [phone]    Currently tolerating nasal cannula at 2 liters   Ambulating with PT    Physical Examination:  PULM: No wheeze  CVS: Regular rate and rhythm, no murmurs, rubs, or gallops  ABD: Soft, non-tender  EXT:  No clubbing, cyanosis, or edema    RADIOLOGY REVIEWED  CXR:    CT chest:    PROCEDURE DATE:  02/27/2021        INTERPRETATION:  CLINICAL INFORMATION: Hypoxia, Covid 19 infection, assess for pulmonary embolism    COMPARISON: Chest radiograph from same day    PROCEDURE:  CT Angiography of the Chest.  90 ml of Omnipaque 350 was injected intravenously. 10 ml were discarded.  Sagittal and coronal reformats were performed as well as 3D (MIP) reconstructions.    FINDINGS:    LUNGS AND AIRWAYS: Patent central airways.  Bilateral patchy groundglass and consolidative opacities with peripheral and lower lobe predominance.  PLEURA: No pleural effusion.  MEDIASTINUM AND KASIE: No lymphadenopathy.  VESSELS: No central, main, lobar, or segmental pulmonary embolism. Evaluation of the subsegmental pulmonary arteries is limited/nondiagnostic secondary to respiratory motion.  HEART: Heart size is normal. No pericardial effusion.  CHEST WALL AND LOWER NECK: Prominent right upper paratracheal lymph node may be reactive  VISUALIZED UPPER ABDOMEN: Small hiatal hernia.  BONES: Spinal degenerative changes.    IMPRESSION:  No central, main, lobar, or segmental pulmonary embolism. Evaluation of the subsegmental pulmonary arteries is limited/nondiagnostic secondary to respiratory motion.    Bilateral patchy groundglass and consolidative opacities with peripheral and lower lobe predominance, compatible with patient's known Covid 19 infection. Follow-up to resolution recommended.    TTE:

## 2021-03-30 NOTE — PROGRESS NOTE ADULT - PROBLEM SELECTOR PROBLEM 1
Atrial fibrillation with RVR
COVID-19
Atrial fibrillation with RVR
Atrial fibrillation with RVR
COVID-19
Atrial fibrillation with RVR
COVID-19
Atrial fibrillation with RVR
COVID-19
Atrial fibrillation with RVR
COVID-19
Atrial fibrillation with RVR
COVID-19
Atrial fibrillation with RVR
Atrial fibrillation with RVR
COVID-19
Atrial fibrillation with RVR
COVID-19
COVID-19
Atrial fibrillation with RVR
COVID-19

## 2021-03-30 NOTE — PROGRESS NOTE ADULT - PROBLEM SELECTOR PLAN 3
Stable
cont FIO2 as above prone as necessary, keep sats 88-90% as per pulmonary
cont FIO2 as above prone as necessary, keep sats 88-90% as per pulmonary
On Metoprolol
Stable
cont FIO2 as above prone as necessary, keep sats 88-90% as per pulmonary
On Metoprolol
Stable
Wean as tolerated
Wean as tolerated
cont FIO2 as above prone as necessary, keep sats 88-90% as per pulmonary
cont on 100% NRB, prone as necessary, keep sats 92% and above
Stable
cont FIO2 as above prone as necessary, keep sats 88-90% as per pulmonary
Stable
Stable
cont FIO2 as above prone as necessary, keep sats 88-90% as per pulmonary
Stable
cont FIO2 as above prone as necessary, keep sats 88-90% as per pulmonary
Stable
Wean as tolerated
cont FIO2 as above prone as necessary, keep sats 88-90% as per pulmonary
On Metoprolol
Stable
cont FIO2 as above prone as necessary, keep sats 88-90% as per pulmonary
Stable
cont FIO2 as above prone as necessary, keep sats 88-90% as per pulmonary

## 2021-03-30 NOTE — PROGRESS NOTE ADULT - PROBLEM SELECTOR PLAN 1
cont  Dexamethasone, completed Remdesivir  full dose AC w sc Lovenox,   unable to R/O PE. check LE dopplers, bedside TTE,   on BIPAP  pulm and ID following,   trend inflammatory markers, consider tocilizumab   sepsis resolved
metoprolol 50 mg q6h  Lopressor 5 IV PRN q4 hours HR > 120
Change metoprolol to 100 bid   Lopressor 5 IV PRN q4 hours HR > 120  Eliquis 5 bid
completed Remdesivir and Dexamethasone  appears nontoxic  now tolerating 35% venti mask  full dose AC w sc Lovenox,   unable to R/O PE  pulm and ID following,   sepsis resolved  rpt covid swab positive
completed Remdesivir and Dexamethasone  full dose AC w sc Lovenox,   unable to R/O PE. check LE dopplers, bedside TTE,   on BIPAP  pulm and ID following,   trend inflammatory markers, consider tocilizumab   sepsis resolved
metoprolol 50 mg q6h  Lopressor 5 IV PRN q4 hours HR > 120
metoprolol 50 mg q6h  Lopressor 5 IV PRN q4 hours HR > 120
Change metoprolol to 100 bid   Lopressor 5 IV PRN q4 hours HR > 120  Eliquis 5 bid
On supp O2  appears nontoxic  On Eliquis 5 mg bid for AFib  pulm and ID following,
On supp O2  appears nontoxic  On Eliquis 5 mg bid for AFib  pulm and ID following,
completed Remdesivir and Dexamethasone  appears nontoxic  On Eliquis 5 mg bid for AFib  pulm and ID following,   sepsis resolved
completed Remdesivir and Dexamethasone  appears nontoxic  now tolerating 4L NC  full dose AC s/p sc Lovenox, now on Eliquis 5 mg bid for AFib  unable to R/O PE  pulm and ID following,   sepsis resolved  rpt covid swab positive
metoprolol 50 mg q6h  Lopressor 5 IV PRN q4 hours HR > 120
metoprolol 50 mg q6h  Lopressor 5 IV PRN q4 hours HR > 120
Now in SR   Cont Cardizem 30 tid
On supp O2  appears nontoxic  On Eliquis 5 mg bid for AFib  pulm and ID following,
completed Remdesivir and Dexamethasone  appears nontoxic  now tolerating 4L NC  full dose AC s/p sc Lovenox, now on Eliquis 5 mg bid for AFib  unable to R/O PE  pulm and ID following,   sepsis resolved  rpt covid swab positive
completed Remdesivir and Dexamethasone  appears nontoxic  now tolerating 6L NC  full dose AC w sc Lovenox,   unable to R/O PE  pulm and ID following,   sepsis resolved  rpt covid swab positive
completed Remdesivir and Dexamethasone  appears nontoxic, however unable to tolerate venti O2,   remains on NRB requirement   full dose AC w sc Lovenox,   unable to R/O PE  pulm and ID following,   sepsis resolved  rpt covid swab
start Remdesivir, Dexamethasone, full dose AC w sc Lovenox, unable to R/O PE. check LE dopplers, bedside TTE, cont 100%NRB. pulm and ID called, trend inflammatory markers  sepsis
Increase metoprolol 50 mg q6h  Lopressor 5 IV PRN q4 hours HR > 120
Now in SR   Cardizem 60 tid  Cont 0.25 PO digoxin daily    s/p 0.5 IV x 1   Lopressor 5 IV PRN q4 hours HR > 120
Now in SR   Cont Cardizem 30 tid
Change metoprolol to 100 bid   Lopressor 5 IV PRN q4 hours HR > 120  Eliquis 5 bid
Now in SR   Cont Cardizem 30 tid
Now in SR   Cont Cardizem 30 tid
completed Remdesivir and Dexamethasone  appears nontoxic  now tolerating 4L NC  full dose AC s/p sc Lovenox, now on Eliquis 5 mg bid for AFib  unable to R/O PE  pulm and ID following,   sepsis resolved  rpt covid swab positive
cont Remdesivir, Dexamethasone,   full dose AC w sc Lovenox,   unable to R/O PE. check LE dopplers, bedside TTE,   on 100%NRB  pulm and ID following,   trend inflammatory markers, consider tocilizumab   sepsis resolved
Now in SR   Cardizem 60 tid  Cont 0.25 PO digoxin daily    s/p 0.5 IV x 1   Lopressor 5 IV PRN q4 hours HR > 120
Now in SR   Cont Cardizem 30 tid
On supp O2  appears nontoxic  On Eliquis 5 mg bid for AFib  pulm and ID following,
completed Remdesivir and Dexamethasone  appears nontoxic  full dose AC w sc Lovenox,   unable to R/O PE  back on NRB  pulm and ID following,   trend inflammatory markers  sepsis resolved
completed Remdesivir and Dexamethasone  appears nontoxic  now tolerating 50% venti mask  full dose AC w sc Lovenox,   unable to R/O PE  pulm and ID following,   sepsis resolved  rpt covid swab positive
metoprolol 50 mg q6h  Lopressor 5 IV PRN q4 hours HR > 120
Now in SR   Increased Cardizem 60 tid  Add 0.25 PO digoxin daily   Lopressor 5 IV now
completed Remdesivir and Dexamethasone  appears nontoxic, however unable to tolerate venti O2, remains on NRB requirement   full dose AC w sc Lovenox,   unable to R/O PE  back on NRB  pulm and ID following,   trend inflammatory markers  sepsis resolved
cont  Dexamethasone, completed Remdesivir  full dose AC w sc Lovenox,   unable to R/O PE. check LE dopplers, bedside TTE,   on BIPAP  pulm and ID following,   trend inflammatory markers, consider tocilizumab   sepsis resolved
Now in SR   Cont Cardizem 30 tid
cont Remdesivir, Dexamethasone,   full dose AC w sc Lovenox,   unable to R/O PE. check LE dopplers, bedside TTE,   on BIPAP  pulm and ID following,   trend inflammatory markers, consider tocilizumab   sepsis resolved
cont Remdesivir, Dexamethasone,   full dose AC w sc Lovenox,   unable to R/O PE. check LE dopplers, bedside TTE,   on HFNC 60L/100%, abg w .   pulm and ID following,   trend inflammatory markers  sepsis resolved
Now in SR   Cont Cardizem 30 tid
DC Cardizem 60 tid and start metoprolol 25mg q6h  Lopressor 5 IV PRN q4 hours HR > 120
Now in SR   Cont Cardizem 30 tid
completed Remdesivir and Dexamethasone  appears nontoxic  full dose AC w sc Lovenox,   unable to R/O PE  on 50% venti  pulm and ID following,   trend inflammatory markers  sepsis resolved
Now better rate controlled   cont with meds
Now in SR    Cardizem 60 tid  restarted 0.25 PO digoxin daily , s/p 0.5 IV x 1   Lopressor 5 IV PRN q4 hours HR > 120
Now in SR   DC digoxin   Cont Cardizem 30 tid
completed Remdesivir and Dexamethasone  appears nontoxic  full dose AC w sc Lovenox,   unable to R/O PE  back on NRB  pulm and ID following,   trend inflammatory markers  sepsis resolved
completed Remdesivir and Dexamethasone  appears nontoxic  now tolerating 40% venti mask  full dose AC w sc Lovenox,   unable to R/O PE  pulm and ID following,   sepsis resolved  rpt covid swab positive
Now in SR    Cardizem 60 tid  Dc 0.25 PO digoxin daily   Lopressor 5 IV now
completed Remdesivir and Dexamethasone  appears nontoxic  full dose AC w sc Lovenox,   unable to R/O PE  back on NRB  pulm and ID following,   trend inflammatory markers  sepsis resolved
cont Remdesivir, Dexamethasone,   full dose AC w sc Lovenox,   unable to R/O PE. check LE dopplers, bedside TTE,   on BIPAP  MICU consult called  pulm and ID following,   trend inflammatory markers  sepsis resolved
On supp O2  appears nontoxic  On Eliquis 5 mg bid for AFib  pulm and ID following,
completed Remdesivir and Dexamethasone  appears nontoxic, however unable to tolerate venti O2, remains on NRB requirement   full dose AC w sc Lovenox,   unable to R/O PE  back on NRB  pulm and ID following,   trend inflammatory markers  sepsis resolved
cont Remdesivir, Dexamethasone,   full dose AC w sc Lovenox,   unable to R/O PE. check LE dopplers, bedside TTE,   on BIPAP  pulm and ID following,   trend inflammatory markers, consider tocilizumab   sepsis resolved
completed Remdesivir and Dexamethasone  appears nontoxic  now tolerating 6L NC  full dose AC w sc Lovenox,   unable to R/O PE  pulm and ID following,   sepsis resolved  rpt covid swab positive
completed Remdesivir and Dexamethasone  appears nontoxic, however unable to tolerate venti O2, remains on NRB requirement   full dose AC w sc Lovenox,   unable to R/O PE  back on NRB  pulm and ID following,   trend inflammatory markers  sepsis resolved

## 2021-03-31 ENCOUNTER — TRANSCRIPTION ENCOUNTER (OUTPATIENT)
Age: 66
End: 2021-03-31

## 2021-03-31 LAB
ALBUMIN SERPL ELPH-MCNC: 2.8 G/DL — LOW (ref 3.3–5)
ALP SERPL-CCNC: 51 U/L — SIGNIFICANT CHANGE UP (ref 40–120)
ALT FLD-CCNC: 12 U/L — SIGNIFICANT CHANGE UP (ref 10–45)
ANION GAP SERPL CALC-SCNC: 11 MMOL/L — SIGNIFICANT CHANGE UP (ref 5–17)
AST SERPL-CCNC: 14 U/L — SIGNIFICANT CHANGE UP (ref 10–40)
BILIRUB SERPL-MCNC: 0.3 MG/DL — SIGNIFICANT CHANGE UP (ref 0.2–1.2)
BUN SERPL-MCNC: 11 MG/DL — SIGNIFICANT CHANGE UP (ref 7–23)
CALCIUM SERPL-MCNC: 8.6 MG/DL — SIGNIFICANT CHANGE UP (ref 8.4–10.5)
CHLORIDE SERPL-SCNC: 103 MMOL/L — SIGNIFICANT CHANGE UP (ref 96–108)
CO2 SERPL-SCNC: 29 MMOL/L — SIGNIFICANT CHANGE UP (ref 22–31)
CREAT SERPL-MCNC: 1.13 MG/DL — SIGNIFICANT CHANGE UP (ref 0.5–1.3)
GLUCOSE SERPL-MCNC: 76 MG/DL — SIGNIFICANT CHANGE UP (ref 70–99)
HCT VFR BLD CALC: 31.9 % — LOW (ref 39–50)
HGB BLD-MCNC: 10.2 G/DL — LOW (ref 13–17)
MCHC RBC-ENTMCNC: 29.3 PG — SIGNIFICANT CHANGE UP (ref 27–34)
MCHC RBC-ENTMCNC: 32 GM/DL — SIGNIFICANT CHANGE UP (ref 32–36)
MCV RBC AUTO: 91.7 FL — SIGNIFICANT CHANGE UP (ref 80–100)
NRBC # BLD: 0 /100 WBCS — SIGNIFICANT CHANGE UP (ref 0–0)
PLATELET # BLD AUTO: 384 K/UL — SIGNIFICANT CHANGE UP (ref 150–400)
POTASSIUM SERPL-MCNC: 3.6 MMOL/L — SIGNIFICANT CHANGE UP (ref 3.5–5.3)
POTASSIUM SERPL-SCNC: 3.6 MMOL/L — SIGNIFICANT CHANGE UP (ref 3.5–5.3)
PROT SERPL-MCNC: 5.9 G/DL — LOW (ref 6–8.3)
RBC # BLD: 3.48 M/UL — LOW (ref 4.2–5.8)
RBC # FLD: 14 % — SIGNIFICANT CHANGE UP (ref 10.3–14.5)
SODIUM SERPL-SCNC: 143 MMOL/L — SIGNIFICANT CHANGE UP (ref 135–145)
WBC # BLD: 5.1 K/UL — SIGNIFICANT CHANGE UP (ref 3.8–10.5)
WBC # FLD AUTO: 5.1 K/UL — SIGNIFICANT CHANGE UP (ref 3.8–10.5)

## 2021-03-31 RX ORDER — APIXABAN 2.5 MG/1
1 TABLET, FILM COATED ORAL
Qty: 60 | Refills: 0
Start: 2021-03-31 | End: 2021-04-29

## 2021-03-31 RX ORDER — METOPROLOL TARTRATE 50 MG
1 TABLET ORAL
Qty: 60 | Refills: 0
Start: 2021-03-31 | End: 2021-04-29

## 2021-03-31 RX ORDER — GUAIFENESIN/DEXTROMETHORPHAN 600MG-30MG
10 TABLET, EXTENDED RELEASE 12 HR ORAL
Qty: 0 | Refills: 0 | DISCHARGE
Start: 2021-03-31

## 2021-03-31 RX ORDER — ACETAMINOPHEN 500 MG
2 TABLET ORAL
Qty: 0 | Refills: 0 | DISCHARGE
Start: 2021-03-31

## 2021-03-31 RX ADMIN — Medication 100 MILLIGRAM(S): at 21:23

## 2021-03-31 RX ADMIN — Medication 10 MILLILITER(S): at 17:26

## 2021-03-31 RX ADMIN — Medication 100 MILLIGRAM(S): at 05:43

## 2021-03-31 RX ADMIN — Medication 10 MILLILITER(S): at 11:08

## 2021-03-31 RX ADMIN — Medication 100 MILLIGRAM(S): at 17:26

## 2021-03-31 RX ADMIN — APIXABAN 5 MILLIGRAM(S): 2.5 TABLET, FILM COATED ORAL at 05:42

## 2021-03-31 RX ADMIN — Medication 100 MILLIGRAM(S): at 13:51

## 2021-03-31 RX ADMIN — APIXABAN 5 MILLIGRAM(S): 2.5 TABLET, FILM COATED ORAL at 17:26

## 2021-03-31 RX ADMIN — Medication 10 MILLILITER(S): at 05:42

## 2021-03-31 RX ADMIN — Medication 100 MILLIGRAM(S): at 05:42

## 2021-03-31 NOTE — DISCHARGE NOTE PROVIDER - NSDCCPCAREPLAN_GEN_ALL_CORE_FT
PRINCIPAL DISCHARGE DIAGNOSIS  Diagnosis: Atrial fibrillation with RVR  Assessment and Plan of Treatment: Continue Lopressor  Follow up with Cardiologist in 1 week  Continue Eliquis        SECONDARY DISCHARGE DIAGNOSES  Diagnosis: COVID-19  Assessment and Plan of Treatment: Received Remdesivir and Decadron  Continue Oxygen   Follow up with your Primary care doctor in 1 week     PRINCIPAL DISCHARGE DIAGNOSIS  Diagnosis: Atrial fibrillation with RVR  Assessment and Plan of Treatment: Continue Lopressor  Follow up with Cardiologist in 1 week  Continue Eliquis and Metoprolol as directed        SECONDARY DISCHARGE DIAGNOSES  Diagnosis: COVID-19  Assessment and Plan of Treatment: Received Remdesivir and Decadron  Continue Oxygen   Follow up with your Primary care doctor in 1 week     PRINCIPAL DISCHARGE DIAGNOSIS  Diagnosis: Atrial fibrillation with RVR  Assessment and Plan of Treatment: Continue Lopressor  Follow up with Cardiologist Dr. Moctezuma in 1 week  Continue Eliquis and Metoprolol as directed  Atrial fibrillation is a common heart rhythm problem which increases the risk of stroke and heat attack.  It helps if you control your blood pressure, avoid alcohol, cut down on caffeine, get treatment for your thyroid if it is overactive, and perform moderate exercise in consultation with your Primary Care Provider.  Call your doctor if you experience chest tightness/pain, lightheadedness, loss of consciousness, shortness of breath (especially with exercise), feel your heart racing or beating unusually, frequent or abnormal bleeding.  It is important to take all your heart medications as prescribed.        SECONDARY DISCHARGE DIAGNOSES  Diagnosis: COVID-19  Assessment and Plan of Treatment: Received Remdesivir and Decadron  Please use home oxygen as dirested  Please follow up with pulmonologist  Dr Meliton Purcell for sleep evaluation to test for sleep apneaas outpatient.   Please follow up with Dr. Queen or your present PCP within 1 week from discharge    Diagnosis: HTN (hypertension)  Assessment and Plan of Treatment: Continue to follow a low salt/sodium diet.  Perform physical activities as tolerated in consultation with your Primary Care Provider and physical therapist.  Take all medications as prescribed.  Follow up with your medical doctor for routine blood pressure monitoring at your next visit.  Notify your doctor if you have any of the following symptoms:  Dizziness, lightheadedness, blurry vision, headache, chest pain, or shortness of breath.

## 2021-03-31 NOTE — DISCHARGE NOTE PROVIDER - PROVIDER TOKENS
PROVIDER:[TOKEN:[[id number]]] PROVIDER:[TOKEN:[[id number]]],PROVIDER:[TOKEN:[[id number]]] PROVIDER:[TOKEN:[[id number]]],PROVIDER:[TOKEN:[[id number]]],PROVIDER:[TOKEN:[[id number]]]

## 2021-03-31 NOTE — PROGRESS NOTE ADULT - ASSESSMENT
ACute hypoxemic respiratory failure  Obesity  Multilobar viral pneumonia due to COVID 19  Atrial Fibrillation: now back in NSR  Refractory hypoxemia: multifactorial - residual COVID, obesity with basilar atelectasis  CTA 2/27 negative PE and LE dopplers negative 3/3    REC    DC planning: will need home O2 for ambulation  Outpatient sleep evaluation to test for sleep apnea: please referr to Meliton Purcell, [facility]

## 2021-03-31 NOTE — CHART NOTE - NSCHARTNOTEFT_GEN_A_CORE
Pt needs Inogen 2 continuos oxygen via Nasal Cannula. Pt's Oxygen on room air is 90% on 2 liters, NC Exercise on room air is 81% and Exercise on Oxygen is 90% Pt needs Inogen 2 continuos oxygen via Nasal Cannula. Pt's Oxygen on room air is 90% on 2 liters, NC Exercise on room air at rest is 81% and Exercise on Oxygen is 90%

## 2021-03-31 NOTE — DISCHARGE NOTE PROVIDER - DETAILS OF MALNUTRITION DIAGNOSIS/DIAGNOSES
This patient has been assessed with a concern for Malnutrition and was treated during this hospitalization for the following Nutrition diagnosis/diagnoses:     -  03/03/2021: Morbid obesity (BMI > 40)

## 2021-03-31 NOTE — DISCHARGE NOTE PROVIDER - CARE PROVIDER_API CALL
Christopher Moctezuma (DO)  Cardiology; Internal Medicine  [address] [place] [address], [address]  [place] [address]  Phone: [phone]  Fax: [phone]  Follow Up Time:    Christopher Moctezuma (DO)  Cardiology; Internal Medicine  [address] [place] [address], [address]  [place] [address]  Phone: [phone]  Fax: [phone]  Follow Up Time:     Meliton Purcell (DO)  Critical Care Medicine; Internal Medicine; Pulmonary Disease  [address] [place] [address], [address]  [place] [address]  Phone: [phone]  Fax: [phone]  Follow Up Time:    Christopher Moctezuma (DO)  Cardiology; Internal Medicine  [address] [place] [address], [address]  [place] [address]  Phone: [phone]  Fax: [phone]  Follow Up Time:     Meliton Purcell (DO)  Critical Care Medicine; Internal Medicine; Pulmonary Disease  [address] [place] [address], [address]  [place] [address]  Phone: [phone]  Fax: [phone]  Follow Up Time:     Flor Raphael)  Medicine  [address] [place] [address], [facility] [address]  [place] [address]  Phone: [phone]  Fax: [phone]  Follow Up Time:

## 2021-03-31 NOTE — DISCHARGE NOTE PROVIDER - HOSPITAL COURSE
65y Male, hx of morbid obesity, HTN on lisinopril, complaining of shortness of breath COVID-19 positive s/p steroids and Remdesivir, hospital course complicated by new oxygen requirement and Afib w RVR. EP consulted for eval of Afib after pt noted to have RVR sustaining 170/180. Currently on Lopressor, HR stable. Pt denies symptoms of chest pain, Worsening shortness of breath, palpitations, lightheadedness or dizziness. CXR 3/8 compared to 3/1 demonstrates slightly worsening multifocal PNA. -Pt on full dose lovenox for dvt/pe.  CT angio chest (-) for PE.   LE dopplers neg DVT. Pt to be dc with Home O2.                65y Male, hx of morbid obesity, HTN on lisinopril, complaining of shortness of breath COVID-19 positive s/p steroids and Remdesivir, hospital course complicated by new oxygen requirement and Afib w RVR. EP consulted for eval of Afib after pt noted to have RVR sustaining 170/180. Currently on Lopressor, HR stable. Pt denies symptoms of chest pain, Worsening shortness of breath, palpitations, lightheadedness or dizziness. CXR 3/8 compared to 3/1 demonstrates slightly worsening multifocal PNA. -Pt on full dose lovenox for dvt/pe.  CT angio chest (-) for PE.   LE dopplers neg DVT. To be discharged on Eliquis for A. fib. Pt to be dc with Home O2.                65y Male, hx of morbid obesity, HTN on lisinopril, complaining of shortness of breath COVID-19 positive s/p steroids and Remdesivir, hospital course complicated by new oxygen requirement and Afib w RVR. EP consulted for eval of Afib after pt noted to have RVR sustaining 170/180. Currently on Lopressor, HR stable. Pt denies symptoms of chest pain, Worsening shortness of breath, palpitations, lightheadedness or dizziness. CXR 3/8 compared to 3/1 demonstrates slightly worsening multifocal PNA. -Pt on full dose lovenox for dvt/pe.  CT angio chest (-) for PE.   LE dopplers neg DVT. To be discharged on Eliquis for A. fib. Pt to be dc with Home O2.         Discharge/Dispo/Med rec discussed with attending. Patient medically stable for discharge home with home care and Outpatient follow up for sleep evaluation to test for sleep apnea with Dr Meliton Purcell, outpt cardiology f/u w Dr Moctezuma and outpt pcp with Dr. Queen or his present PCP

## 2021-03-31 NOTE — PROGRESS NOTE ADULT - SUBJECTIVE AND OBJECTIVE BOX
Patient is a 65y old  Male who presents with a chief complaint of acute hypoxemic respiratory failure (31 Mar 2021 12:58)      SUBJECTIVE / OVERNIGHT EVENTS: ptn desats with minimal activity,     MEDICATIONS  (STANDING):  apixaban 5 milliGRAM(s) Oral every 12 hours  benzonatate 100 milliGRAM(s) Oral every 8 hours  guaifenesin/dextromethorphan  Syrup 10 milliLiter(s) Oral every 6 hours  metoprolol tartrate 100 milliGRAM(s) Oral two times a day    MEDICATIONS  (PRN):  acetaminophen   Tablet .. 650 milliGRAM(s) Oral every 6 hours PRN Mild Pain (1 - 3)      Vital Signs Last 24 Hrs  T(F): 98.6 (03-31-21 @ 12:14), Max: 98.9 (03-30-21 @ 21:08)  HR: 55 (03-31-21 @ 17:16) (50 - 68)  BP: 128/74 (03-31-21 @ 17:16) (115/68 - 133/77)  RR: 18 (03-31-21 @ 17:09) (18 - 18)  SpO2: 86% (03-31-21 @ 17:09) (86% - 99%)  Telemetry:   CAPILLARY BLOOD GLUCOSE        I&O's Summary    30 Mar 2021 07:01  -  31 Mar 2021 07:00  --------------------------------------------------------  IN: 0 mL / OUT: 650 mL / NET: -650 mL    31 Mar 2021 07:01  -  31 Mar 2021 19:12  --------------------------------------------------------  IN: 0 mL / OUT: 900 mL / NET: -900 mL        PHYSICAL EXAM:  GENERAL: NAD, well-developed  HEAD:  Atraumatic, Normocephalic  EYES: EOMI, PERRLA, conjunctiva and sclera clear  NECK: Supple, No JVD  CHEST/LUNG: Clear to auscultation bilaterally; No wheeze  HEART: Regular rate and rhythm; No murmurs, rubs, or gallops  ABDOMEN: Soft, Nontender, Nondistended; Bowel sounds present  EXTREMITIES:  2+ Peripheral Pulses, No clubbing, cyanosis, or edema  PSYCH: AAOx3  NEUROLOGY: non-focal  SKIN: No rashes or lesions    LABS:                        10.2   5.10  )-----------( 384      ( 31 Mar 2021 06:28 )             31.9     03-31    143  |  103  |  11  ----------------------------<  76  3.6   |  29  |  1.13    Ca    8.6      31 Mar 2021 06:28    TPro  5.9<L>  /  Alb  2.8<L>  /  TBili  0.3  /  DBili  x   /  AST  14  /  ALT  12  /  AlkPhos  51  03-31              RADIOLOGY & ADDITIONAL TESTS:    Imaging Personally Reviewed:    Consultant(s) Notes Reviewed:      Care Discussed with Consultants/Other Providers:

## 2021-03-31 NOTE — PROGRESS NOTE ADULT - SUBJECTIVE AND OBJECTIVE BOX
Subjective: Patient seen and examined. No new events except as noted.     REVIEW OF SYSTEMS:    CONSTITUTIONAL: No weakness, fevers or chills  EYES/ENT: No visual changes;  No vertigo or throat pain   NECK: No pain or stiffness  RESPIRATORY: No cough, wheezing, hemoptysis; No shortness of breath  CARDIOVASCULAR: No chest pain or palpitations  GASTROINTESTINAL: No abdominal or epigastric pain. No nausea, vomiting, or hematemesis; No diarrhea or constipation. No melena or hematochezia.  GENITOURINARY: No dysuria, frequency or hematuria  NEUROLOGICAL: No numbness or weakness  SKIN: No itching, burning, rashes, or lesions   All other review of systems is negative unless indicated above.    MEDICATIONS:  MEDICATIONS  (STANDING):  apixaban 5 milliGRAM(s) Oral every 12 hours  benzonatate 100 milliGRAM(s) Oral every 8 hours  guaifenesin/dextromethorphan  Syrup 10 milliLiter(s) Oral every 6 hours  metoprolol tartrate 100 milliGRAM(s) Oral two times a day      PHYSICAL EXAM:  T(C): 36.5 (03-31-21 @ 04:00), Max: 37.2 (03-30-21 @ 21:08)  HR: 65 (03-31-21 @ 09:19) (60 - 68)  BP: 115/68 (03-31-21 @ 04:00) (115/68 - 120/78)  RR: 18 (03-31-21 @ 04:00) (18 - 18)  SpO2: 93% (03-31-21 @ 09:19) (91% - 99%)  Wt(kg): --  I&O's Summary    30 Mar 2021 07:01  -  31 Mar 2021 07:00  --------------------------------------------------------  IN: 0 mL / OUT: 650 mL / NET: -650 mL    31 Mar 2021 07:01  -  31 Mar 2021 12:08  --------------------------------------------------------  IN: 0 mL / OUT: 900 mL / NET: -900 mL          Appearance: Normal	  HEENT:   Normal oral mucosa, PERRL, EOMI	  Lymphatic: No lymphadenopathy , no edema  Cardiovascular: Normal S1 S2, No JVD, No murmurs , Peripheral pulses palpable 2+ bilaterally  Respiratory: Lungs clear to auscultation, normal effort 	  Gastrointestinal:  Soft, Non-tender, + BS	  Skin: No rashes, No ecchymoses, No cyanosis, warm to touch  Musculoskeletal: Normal range of motion, normal strength  Psychiatry:  Mood & affect appropriate  Ext: No edema      LABS:    CARDIAC MARKERS:                                10.2   5.10  )-----------( 384      ( 31 Mar 2021 06:28 )             31.9     03-31    143  |  103  |  11  ----------------------------<  76  3.6   |  29  |  1.13    Ca    8.6      31 Mar 2021 06:28    TPro  5.9<L>  /  Alb  2.8<L>  /  TBili  0.3  /  DBili  x   /  AST  14  /  ALT  12  /  AlkPhos  51  03-31    proBNP:   Lipid Profile:   HgA1c:   TSH:             TELEMETRY: 	    ECG:  	  RADIOLOGY:   DIAGNOSTIC TESTING:  [ ] Echocardiogram:  [ ]  Catheterization:  [ ] Stress Test:    OTHER:

## 2021-03-31 NOTE — PROGRESS NOTE ADULT - SUBJECTIVE AND OBJECTIVE BOX
Follow-up Pulm Progress Note  Francisco Ramon MD  [phone]    Room air oxygen sats now normal  Still desaturates with ambulation    Physical Examination:  PULM: No wheeze  CVS: Regular rate and rhythm, no murmurs, rubs, or gallops  ABD: Soft, non-tender  EXT:  No clubbing, cyanosis, or edema    RADIOLOGY REVIEWED  CXR:    CT chest:    PROCEDURE DATE:  02/27/2021        INTERPRETATION:  CLINICAL INFORMATION: Hypoxia, Covid 19 infection, assess for pulmonary embolism    COMPARISON: Chest radiograph from same day    PROCEDURE:  CT Angiography of the Chest.  90 ml of Omnipaque 350 was injected intravenously. 10 ml were discarded.  Sagittal and coronal reformats were performed as well as 3D (MIP) reconstructions.    FINDINGS:    LUNGS AND AIRWAYS: Patent central airways.  Bilateral patchy groundglass and consolidative opacities with peripheral and lower lobe predominance.  PLEURA: No pleural effusion.  MEDIASTINUM AND KASIE: No lymphadenopathy.  VESSELS: No central, main, lobar, or segmental pulmonary embolism. Evaluation of the subsegmental pulmonary arteries is limited/nondiagnostic secondary to respiratory motion.  HEART: Heart size is normal. No pericardial effusion.  CHEST WALL AND LOWER NECK: Prominent right upper paratracheal lymph node may be reactive  VISUALIZED UPPER ABDOMEN: Small hiatal hernia.  BONES: Spinal degenerative changes.    IMPRESSION:  No central, main, lobar, or segmental pulmonary embolism. Evaluation of the subsegmental pulmonary arteries is limited/nondiagnostic secondary to respiratory motion.    Bilateral patchy groundglass and consolidative opacities with peripheral and lower lobe predominance, compatible with patient's known Covid 19 infection. Follow-up to resolution recommended.    TTE:

## 2021-03-31 NOTE — PHARMACOTHERAPY INTERVENTION NOTE - COMMENTS
Patient is a 65yoM with PMHx HTN diagnosed with non-valvular atrial fibrillation.    Labs:   Scr: 1.13  [id number]: 1    MEDICATIONS:  apixaban 5 milliGRAM(s) Oral every 12 hours  metoprolol tartrate 100 milliGRAM(s) Oral two times a day    > Prescription for apixaban 5 mg BID sent to [facility] pharmacy.  > Co-payment for this month $0.00, then [facility] would [facility]. Patient total copayment for all medications upon discharge ($21.00). Patient able to pay copayment.   > Patient counseled on indication/side effects of medications (metoprolol tartrate and apixaban). Patient instructed to avoid medications that increase bleeding while on therapy (NSAIDs).    Evelin Morrison Pharm.D., [facility]  Clinical Pharmacy Specialist  Phone: [phone]

## 2021-04-01 RX ADMIN — APIXABAN 5 MILLIGRAM(S): 2.5 TABLET, FILM COATED ORAL at 05:18

## 2021-04-01 RX ADMIN — Medication 10 MILLILITER(S): at 12:02

## 2021-04-01 RX ADMIN — APIXABAN 5 MILLIGRAM(S): 2.5 TABLET, FILM COATED ORAL at 17:33

## 2021-04-01 RX ADMIN — Medication 10 MILLILITER(S): at 00:59

## 2021-04-01 RX ADMIN — Medication 100 MILLIGRAM(S): at 21:14

## 2021-04-01 RX ADMIN — Medication 10 MILLILITER(S): at 05:18

## 2021-04-01 RX ADMIN — Medication 100 MILLIGRAM(S): at 05:18

## 2021-04-01 RX ADMIN — Medication 100 MILLIGRAM(S): at 17:33

## 2021-04-01 RX ADMIN — Medication 100 MILLIGRAM(S): at 13:54

## 2021-04-01 RX ADMIN — Medication 10 MILLILITER(S): at 17:33

## 2021-04-01 RX ADMIN — Medication 10 MILLILITER(S): at 23:20

## 2021-04-01 NOTE — PROGRESS NOTE ADULT - ASSESSMENT
The patient is a 65y Male, hx of morbid obesity, HTN on lisinopril, complaining of shortness of breath. covid + dx 5d prior. no home O2 requirement, former smoker. has dyspnea at rest and on exertion. has not taken steroids, no hospitalization for covid. denies CP. [facility] (27 Feb 2021 22:29)    ER vital:  T 98.4, P 178, /85.  Pt satting 85% on RA, placed on nasal cannula, advanced to NRB.  Covid pcr (+), covid serologies (-).   CT angio chest (-) for PE, (+) b/l patchy opacities.  Pt with elevated AST (>5x ULN but <10x) and ALT.   Pt started on remdesivir and dexamethasone.     Acute covid illness:    - Cont remdesivir and dexamethasone.  Monitor daily LFTs and renal function while pt on remdesivir.  LFTs elevated, possibly secondary to viral infection.  Currently ALT < 10x ULN, can cont remdesivir.  LFTs slowly improving.   Pt completed remdesivir and dexa.     - Monitor pulse ox, pt on supplemental O2 - transitioned to 4L nc    - Monitor inflammatory markers q72    DD:  192 <-- 272 <-- 359 <-- 248 <-- 213 <-- 282 <-- 343  ferritin: 666 <-- 692 <-- 1468 <-- 1636  pct:  0.58  crp: 1.28 <-- 3.0 <-- 2.2 <-- 10.3 <-- 10.9    - Pt now on apixiban for dvt ppx.  CT angio chest (-) for PE.   LE dopplers neg DVT    *  Repeat cxr 3/8 with continued b/l patchy opacities with slight worsening.  No indication for abx at this time.  Pt on VM mask.  Repeat covid swab sent 3/16 positive.      * Oxygenation improving, now on 3L nc.  Repeat cxr 3/22 with b/l patchy opacities c/w covid pna.   Cont present managment.  Wean O2 as tolerated.     4/1 Continues to remain 1-2L on NC. Afebrile, no leukocytosis. Wean O2 as tolerated. Appreciate pulm recs. D/c planning per primary team    I am covering Dr. Wandy Luke through 4/4.  Infectious Diseases will continue to follow. Please call with any questions.   Lynn Chambesr M.D.  [facility], Division of Infectious Diseases [phone]  For over the weekend and after hours, please call [phone]

## 2021-04-01 NOTE — CHART NOTE - NSCHARTNOTEFT_GEN_A_CORE
Nutrition Follow Up Note  Patient seen for: nutrition follow-up.    Chart reviewed, events noted.    Source: Pt, EMR    Diet : DASH/TLC     Patient reports: Good PO intake, No nausea/vomiting.    PO intake : >75%    GI:  Regular BMs BID per pt    Weight: 292.3 pounds (3/31), dosing wt 310 pounds (2/27). 18-pound wt loss x1 month due to prolonged hospitalization, caloric deficit while in hospital. Pt with good PO intake x3 weeks now    MEDICATIONS  (STANDING):  apixaban 5 milliGRAM(s) Oral every 12 hours  benzonatate 100 milliGRAM(s) Oral every 8 hours  guaifenesin/dextromethorphan  Syrup 10 milliLiter(s) Oral every 6 hours  metoprolol tartrate 100 milliGRAM(s) Oral two times a day    03-31 Na143 mmol/L Glu 76 mg/dL K+ 3.6 mmol/L Cr  1.13 mg/dL BUN 11 mg/dL Phos n/a   Alb 2.8 g/dL<L> PAB n/a       Skin per nursing documentation: no pressure injuries   Edema: 1+ berna. ankles    Estimated Needs:   [ x] no change since previous assessment  [ ] recalculated:     Previous Nutrition Dx:   1. Inadequate protein energy intake - resolved.  2. Overweight/obesity- ongoing.    Nutrition Care Plan:  [ ] In Progress  [x ] Achieved    Education: Reviewed low-sugar diet with pt, pt has no questions     Recommended Interventions:   1) Continue DASH diet, no need for Consistent Carbohydrate restriction antonio this time, BG well-controlled.   2) Continue to monitor PO intake  3) Pt aware RD remains available.    Monitoring and Evaluation:   Continue to monitor Nutritional intake, Tolerance to diet prescription, weights, labs, skin integrity    Dona Escobar RD, CDN  Pager [phone]  RD remains available upon request and will follow up per protocol

## 2021-04-01 NOTE — PROGRESS NOTE ADULT - SUBJECTIVE AND OBJECTIVE BOX
[facility], Division of Infectious Diseases  JERICHO Ocasio Y. Patel, S. Shah  [phone]    Name: SANA MALCOLM  Age: 65y  Gender: Male  MRN: [id number]  Note Date: 04-01-21    Interval History:  No acute overnight events. Afebrile  Notes reviewed    Antibiotics:      Medications:  acetaminophen   Tablet .. 650 milliGRAM(s) Oral every 6 hours PRN  apixaban 5 milliGRAM(s) Oral every 12 hours  benzonatate 100 milliGRAM(s) Oral every 8 hours  guaifenesin/dextromethorphan  Syrup 10 milliLiter(s) Oral every 6 hours  metoprolol tartrate 100 milliGRAM(s) Oral two times a day      Review of Systems:  A 10-point review of systems was obtained.     Pertinent positives and negatives--  Constitutional: No fevers. No Chills. No Rigors.   Cardiovascular: No chest pain. No palpitations.  Respiratory: No shortness of breath. No cough.  Gastrointestinal: No nausea or vomiting. No diarrhea or constipation.   Psychiatric: Pleasant. Appropriate affect.    Review of systems otherwise negative except as previously noted.    Allergies: Actifed (Headache)  Sudafed (Headache)    For details regarding the patient's past medical history, social history, family history, and other miscellaneous elements, please refer the initial infectious diseases consultation and/or the admitting history and physical examination for this admission.    Objective:  Vitals:   T(C): 36.8 (04-01-21 @ 11:19), Max: 37.2 (04-01-21 @ 04:19)  HR: 62 (04-01-21 @ 11:19) (55 - 71)  BP: 110/73 (04-01-21 @ 11:19) (110/73 - 144/90)  RR: 18 (04-01-21 @ 11:19) (18 - 18)  SpO2: 94% (04-01-21 @ 11:19) (86% - 96%)    Physical Examination:  General: no acute distress  HEENT: NC/AT, EOMI, anicteric, no oral lesions  Neck: supple, no palpable LAD  Cardio: S1, S2 heard, RRR, no murmurs  Resp: decreased b/l breath sounds  Abd: soft, NT, ND, + bowel sounds  Neuro: no obvious focal deficits  Ext: no edema or cyanosis      Laboratory Studies:  CBC:                       10.2   5.10  )-----------( 384      ( 31 Mar 2021 06:28 )             31.9     CMP: 03-31    143  |  103  |  11  ----------------------------<  76  3.6   |  29  |  1.13    Ca    8.6      31 Mar 2021 06:28    TPro  5.9<L>  /  Alb  2.8<L>  /  TBili  0.3  /  DBili  x   /  AST  14  /  ALT  12  /  AlkPhos  51  03-31    LIVER FUNCTIONS - ( 31 Mar 2021 06:28 )  Alb: 2.8 g/dL / Pro: 5.9 g/dL / ALK PHOS: 51 U/L / ALT: 12 U/L / AST: 14 U/L / GGT: x             Microbiology: reviewed      Radiology: reviewed

## 2021-04-01 NOTE — PROGRESS NOTE ADULT - SUBJECTIVE AND OBJECTIVE BOX
Follow-up Pulm Progress Note  Francisco Ramon MD  [phone]    Room air oxygen sats now normal  Still desaturates with ambulation: general on 1-2 liters nasal cannula      Vital Signs Last 24 Hrs  T(C): 36.8 (01 Apr 2021 11:19), Max: 37.2 (01 Apr 2021 04:19)  T(F): 98.2 (01 Apr 2021 11:19), Max: 99 (01 Apr 2021 04:19)  HR: 62 (01 Apr 2021 11:19) (55 - 71)  BP: 110/73 (01 Apr 2021 11:19) (110/73 - 144/90)  BP(mean): --  RR: 18 (01 Apr 2021 11:19) (18 - 18)  SpO2: 94% (01 Apr 2021 11:19) (86% - 96%)                          10.2   5.10  )-----------( 384      ( 31 Mar 2021 06:28 )             31.9     03-31    143  |  103  |  11  ----------------------------<  76  3.6   |  29  |  1.13    Ca    8.6      31 Mar 2021 06:28    TPro  5.9<L>  /  Alb  2.8<L>  /  TBili  0.3  /  DBili  x   /  AST  14  /  ALT  12  /  AlkPhos  51  03-31    Physical Examination:  PULM: No wheeze  CVS: Regular rate and rhythm, no murmurs, rubs, or gallops  ABD: Soft, non-tender  EXT:  No clubbing, cyanosis, or edema    RADIOLOGY REVIEWED  CXR:    CT chest:    PROCEDURE DATE:  02/27/2021        INTERPRETATION:  CLINICAL INFORMATION: Hypoxia, Covid 19 infection, assess for pulmonary embolism    COMPARISON: Chest radiograph from same day    PROCEDURE:  CT Angiography of the Chest.  90 ml of Omnipaque 350 was injected intravenously. 10 ml were discarded.  Sagittal and coronal reformats were performed as well as 3D (MIP) reconstructions.    FINDINGS:    LUNGS AND AIRWAYS: Patent central airways.  Bilateral patchy groundglass and consolidative opacities with peripheral and lower lobe predominance.  PLEURA: No pleural effusion.  MEDIASTINUM AND KASIE: No lymphadenopathy.  VESSELS: No central, main, lobar, or segmental pulmonary embolism. Evaluation of the subsegmental pulmonary arteries is limited/nondiagnostic secondary to respiratory motion.  HEART: Heart size is normal. No pericardial effusion.  CHEST WALL AND LOWER NECK: Prominent right upper paratracheal lymph node may be reactive  VISUALIZED UPPER ABDOMEN: Small hiatal hernia.  BONES: Spinal degenerative changes.    IMPRESSION:  No central, main, lobar, or segmental pulmonary embolism. Evaluation of the subsegmental pulmonary arteries is limited/nondiagnostic secondary to respiratory motion.    Bilateral patchy groundglass and consolidative opacities with peripheral and lower lobe predominance, compatible with patient's known Covid 19 infection. Follow-up to resolution recommended.    TTE:

## 2021-04-01 NOTE — PROGRESS NOTE ADULT - SUBJECTIVE AND OBJECTIVE BOX
Patient is a 65y old  Male who presents with a chief complaint of acute hypoxemic respiratory failure (01 Apr 2021 14:01)      SUBJECTIVE / OVERNIGHT EVENTS:    MEDICATIONS  (STANDING):  apixaban 5 milliGRAM(s) Oral every 12 hours  benzonatate 100 milliGRAM(s) Oral every 8 hours  guaifenesin/dextromethorphan  Syrup 10 milliLiter(s) Oral every 6 hours  metoprolol tartrate 100 milliGRAM(s) Oral two times a day    MEDICATIONS  (PRN):  acetaminophen   Tablet .. 650 milliGRAM(s) Oral every 6 hours PRN Mild Pain (1 - 3)      Vital Signs Last 24 Hrs  T(F): 98.5 (04-01-21 @ 21:17), Max: 99 (04-01-21 @ 04:19)  HR: 51 (04-01-21 @ 21:17) (51 - 90)  BP: 126/85 (04-01-21 @ 21:17) (110/73 - 141/83)  RR: 18 (04-01-21 @ 21:17) (18 - 18)  SpO2: 93% (04-01-21 @ 21:17) (93% - 96%)  Telemetry:   CAPILLARY BLOOD GLUCOSE        I&O's Summary    31 Mar 2021 07:01  -  01 Apr 2021 07:00  --------------------------------------------------------  IN: 275 mL / OUT: 2100 mL / NET: -1825 mL    01 Apr 2021 07:01  -  01 Apr 2021 23:24  --------------------------------------------------------  IN: 200 mL / OUT: 500 mL / NET: -300 mL        PHYSICAL EXAM:  GENERAL: NAD, well-developed  HEAD:  Atraumatic, Normocephalic  EYES: EOMI, PERRLA, conjunctiva and sclera clear  NECK: Supple, No JVD  CHEST/LUNG: Clear to auscultation bilaterally; No wheeze  HEART: Regular rate and rhythm; No murmurs, rubs, or gallops  ABDOMEN: Soft, Nontender, Nondistended; Bowel sounds present  EXTREMITIES:  2+ Peripheral Pulses, No clubbing, cyanosis, or edema  PSYCH: AAOx3  NEUROLOGY: non-focal  SKIN: No rashes or lesions    LABS:                        10.2   5.10  )-----------( 384      ( 31 Mar 2021 06:28 )             31.9     03-31    143  |  103  |  11  ----------------------------<  76  3.6   |  29  |  1.13    Ca    8.6      31 Mar 2021 06:28    TPro  5.9<L>  /  Alb  2.8<L>  /  TBili  0.3  /  DBili  x   /  AST  14  /  ALT  12  /  AlkPhos  51  03-31              RADIOLOGY & ADDITIONAL TESTS:    Imaging Personally Reviewed:    Consultant(s) Notes Reviewed:      Care Discussed with Consultants/Other Providers:   Patient is a 65y old  Male who presents with a chief complaint of acute hypoxemic respiratory failure (01 Apr 2021 14:01)      SUBJECTIVE / OVERNIGHT EVENTS: remains on O2    MEDICATIONS  (STANDING):  apixaban 5 milliGRAM(s) Oral every 12 hours  benzonatate 100 milliGRAM(s) Oral every 8 hours  guaifenesin/dextromethorphan  Syrup 10 milliLiter(s) Oral every 6 hours  metoprolol tartrate 100 milliGRAM(s) Oral two times a day    MEDICATIONS  (PRN):  acetaminophen   Tablet .. 650 milliGRAM(s) Oral every 6 hours PRN Mild Pain (1 - 3)      Vital Signs Last 24 Hrs  T(F): 98.5 (04-01-21 @ 21:17), Max: 99 (04-01-21 @ 04:19)  HR: 51 (04-01-21 @ 21:17) (51 - 90)  BP: 126/85 (04-01-21 @ 21:17) (110/73 - 141/83)  RR: 18 (04-01-21 @ 21:17) (18 - 18)  SpO2: 93% (04-01-21 @ 21:17) (93% - 96%)  Telemetry:   CAPILLARY BLOOD GLUCOSE        I&O's Summary    31 Mar 2021 07:01  -  01 Apr 2021 07:00  --------------------------------------------------------  IN: 275 mL / OUT: 2100 mL / NET: -1825 mL    01 Apr 2021 07:01  -  01 Apr 2021 23:24  --------------------------------------------------------  IN: 200 mL / OUT: 500 mL / NET: -300 mL        PHYSICAL EXAM:  GENERAL: NAD, well-developed  HEAD:  Atraumatic, Normocephalic  EYES: EOMI, PERRLA, conjunctiva and sclera clear  NECK: Supple, No JVD  CHEST/LUNG: Clear to auscultation bilaterally; No wheeze  HEART: Regular rate and rhythm; No murmurs, rubs, or gallops  ABDOMEN: Soft, Nontender, Nondistended; Bowel sounds present  EXTREMITIES:  2+ Peripheral Pulses, No clubbing, cyanosis, or edema  PSYCH: AAOx3  NEUROLOGY: non-focal  SKIN: No rashes or lesions    LABS:                        10.2   5.10  )-----------( 384      ( 31 Mar 2021 06:28 )             31.9     03-31    143  |  103  |  11  ----------------------------<  76  3.6   |  29  |  1.13    Ca    8.6      31 Mar 2021 06:28    TPro  5.9<L>  /  Alb  2.8<L>  /  TBili  0.3  /  DBili  x   /  AST  14  /  ALT  12  /  AlkPhos  51  03-31              RADIOLOGY & ADDITIONAL TESTS:    Imaging Personally Reviewed:    Consultant(s) Notes Reviewed:      Care Discussed with Consultants/Other Providers:

## 2021-04-01 NOTE — PROGRESS NOTE ADULT - ASSESSMENT
ACute hypoxemic respiratory failure  Obesity  Multilobar viral pneumonia due to COVID 19  Atrial Fibrillation: now back in in sinus with sinus alis, on Eliquis. will d/w cardiology dropping the toprol dose  Refractory hypoxemia: multifactorial - residual COVID, obesity( lost 18 lbs while inptn for the past month) with basilar atelectasis  CTA 2/27 negative PE and LE dopplers negative 3/3  PLAN:  DC planning: will need home O2 for ambulation  Outpatient sleep evaluation to test for sleep apnea with Dr Meliton Purecll  outptn cardiology f/u w dr Moctezuma  outptn pcp with dr mims or his present PCP

## 2021-04-01 NOTE — PROGRESS NOTE ADULT - SUBJECTIVE AND OBJECTIVE BOX
Subjective: Patient seen and examined. No new events except as noted.     REVIEW OF SYSTEMS:    CONSTITUTIONAL: + weakness, fevers or chills  EYES/ENT: No visual changes;  No vertigo or throat pain   NECK: No pain or stiffness  RESPIRATORY: No cough, wheezing, hemoptysis; No shortness of breath  CARDIOVASCULAR: No chest pain or palpitations  GASTROINTESTINAL: No abdominal or epigastric pain. No nausea, vomiting, or hematemesis; No diarrhea or constipation. No melena or hematochezia.  GENITOURINARY: No dysuria, frequency or hematuria  NEUROLOGICAL: No numbness or weakness  SKIN: No itching, burning, rashes, or lesions   All other review of systems is negative unless indicated above.    MEDICATIONS:  MEDICATIONS  (STANDING):  apixaban 5 milliGRAM(s) Oral every 12 hours  benzonatate 100 milliGRAM(s) Oral every 8 hours  guaifenesin/dextromethorphan  Syrup 10 milliLiter(s) Oral every 6 hours  metoprolol tartrate 100 milliGRAM(s) Oral two times a day      PHYSICAL EXAM:  T(C): 36.8 (04-01-21 @ 11:19), Max: 37.2 (04-01-21 @ 04:19)  HR: 62 (04-01-21 @ 11:19) (50 - 71)  BP: 110/73 (04-01-21 @ 11:19) (110/73 - 144/90)  RR: 18 (04-01-21 @ 11:19) (18 - 18)  SpO2: 94% (04-01-21 @ 11:19) (86% - 96%)  Wt(kg): --  I&O's Summary    31 Mar 2021 07:01  -  01 Apr 2021 07:00  --------------------------------------------------------  IN: 275 mL / OUT: 2100 mL / NET: -1825 mL          Appearance: NAD  HEENT:   Normal oral mucosa, PERRL, EOMI	  Lymphatic: No lymphadenopathy , no edema  Cardiovascular: Normal S1 S2, No JVD, No murmurs , Peripheral pulses palpable 2+ bilaterally  Respiratory: Lungs clear to auscultation, normal effort 	  Gastrointestinal:  Soft, Non-tender, + BS	  Skin: No rashes, No ecchymoses, No cyanosis, warm to touch  Musculoskeletal: Normal range of motion, normal strength  Psychiatry:  Mood & affect appropriate  Ext: No edema      LABS:    CARDIAC MARKERS:                                10.2   5.10  )-----------( 384      ( 31 Mar 2021 06:28 )             31.9     03-31    143  |  103  |  11  ----------------------------<  76  3.6   |  29  |  1.13    Ca    8.6      31 Mar 2021 06:28    TPro  5.9<L>  /  Alb  2.8<L>  /  TBili  0.3  /  DBili  x   /  AST  14  /  ALT  12  /  AlkPhos  51  03-31    proBNP:   Lipid Profile:   HgA1c:   TSH:             TELEMETRY: 	  SR SB   ECG:  	  RADIOLOGY:   DIAGNOSTIC TESTING:  [ ] Echocardiogram:  [ ]  Catheterization:  [ ] Stress Test:    OTHER:

## 2021-04-01 NOTE — PROGRESS NOTE ADULT - ASSESSMENT
65y Male, hx of morbid obesity, HTN on lisinopril, complaining of shortness of breath. covid + dx 5d prior. no home O2 requirement, former smoker. has dyspnea at rest and on exertion. has not taken steroids, no hospitalization for covid. denies CP. [facility]    Problem/Plan - 1:  ·  Problem: Atrial fibrillation with RVR.  -stable, rate controlled  -continue metoprolol 100 bid   -cont a/c with eliquis 5 bid.   Close outpt follow up     Problem/Plan - 2:  ·  Problem: COVID-19.  Plan: Completed Remdesivir and decadron   -tx/management per med  -cont a/c      Problem/Plan - 3:  ·  Problem: HTN (hypertension).  Plan: Stable.     35 minutes spent on total encounter; more than 50% of the visit was spent counseling and/or coordinating care by the attending physician.

## 2021-04-01 NOTE — PROGRESS NOTE ADULT - ASSESSMENT
ACute hypoxemic respiratory failure  Obesity  Multilobar viral pneumonia due to COVID 19  Atrial Fibrillation: now back in NSR - on Apaxiban  Refractory hypoxemia: multifactorial - residual COVID, obesity with basilar atelectasis  CTA 2/27 negative PE and LE dopplers negative 3/3    REC    DC planning: will need home O2 for ambulation  Outpatient sleep evaluation to test for sleep apnea: please referr to Meliton Purcell, [facility]

## 2021-04-02 RX ADMIN — APIXABAN 5 MILLIGRAM(S): 2.5 TABLET, FILM COATED ORAL at 05:40

## 2021-04-02 RX ADMIN — Medication 100 MILLIGRAM(S): at 21:08

## 2021-04-02 RX ADMIN — Medication 100 MILLIGRAM(S): at 05:40

## 2021-04-02 RX ADMIN — Medication 10 MILLILITER(S): at 11:10

## 2021-04-02 RX ADMIN — Medication 10 MILLILITER(S): at 05:40

## 2021-04-02 RX ADMIN — Medication 100 MILLIGRAM(S): at 13:29

## 2021-04-02 RX ADMIN — APIXABAN 5 MILLIGRAM(S): 2.5 TABLET, FILM COATED ORAL at 17:30

## 2021-04-02 RX ADMIN — Medication 10 MILLILITER(S): at 17:30

## 2021-04-02 RX ADMIN — Medication 10 MILLILITER(S): at 23:04

## 2021-04-02 RX ADMIN — Medication 100 MILLIGRAM(S): at 17:30

## 2021-04-02 NOTE — PROGRESS NOTE ADULT - SUBJECTIVE AND OBJECTIVE BOX
Patient is a 65y old  Male who presents with a chief complaint of acute hypoxemic respiratory failure (02 Apr 2021 14:58)      SUBJECTIVE / OVERNIGHT EVENTS: ptn awaiting authorization for home O2     MEDICATIONS  (STANDING):  apixaban 5 milliGRAM(s) Oral every 12 hours  benzonatate 100 milliGRAM(s) Oral every 8 hours  guaifenesin/dextromethorphan  Syrup 10 milliLiter(s) Oral every 6 hours  metoprolol tartrate 100 milliGRAM(s) Oral two times a day    MEDICATIONS  (PRN):  acetaminophen   Tablet .. 650 milliGRAM(s) Oral every 6 hours PRN Mild Pain (1 - 3)      Vital Signs Last 24 Hrs  T(F): 98.2 (04-02-21 @ 21:28), Max: 98.8 (04-02-21 @ 18:39)  HR: 60 (04-02-21 @ 21:28) (55 - 66)  BP: 124/76 (04-02-21 @ 21:28) (113/74 - 145/93)  RR: 18 (04-02-21 @ 21:28) (18 - 18)  SpO2: 97% (04-02-21 @ 21:28) (95% - 99%)  Telemetry:   CAPILLARY BLOOD GLUCOSE        I&O's Summary    01 Apr 2021 07:01  -  02 Apr 2021 07:00  --------------------------------------------------------  IN: 200 mL / OUT: 1000 mL / NET: -800 mL        PHYSICAL EXAM:  GENERAL: NAD, well-developed  HEAD:  Atraumatic, Normocephalic  EYES: EOMI, PERRLA, conjunctiva and sclera clear  NECK: Supple, No JVD  CHEST/LUNG: Clear to auscultation bilaterally; No wheeze  HEART: Regular rate and rhythm; No murmurs, rubs, or gallops  ABDOMEN: Soft, Nontender, Nondistended; Bowel sounds present  EXTREMITIES:  2+ Peripheral Pulses, No clubbing, cyanosis, or edema  PSYCH: AAOx3  NEUROLOGY: non-focal  SKIN: No rashes or lesions    LABS:                    RADIOLOGY & ADDITIONAL TESTS:    Imaging Personally Reviewed:    Consultant(s) Notes Reviewed:      Care Discussed with Consultants/Other Providers:

## 2021-04-02 NOTE — PROGRESS NOTE ADULT - SUBJECTIVE AND OBJECTIVE BOX
[facility], Division of Infectious Diseases  JERICHO Ocasio Y. Patel, S. Shah  [phone]    Name: SANA MALCOLM  Age: 65y  Gender: Male  MRN: [id number]  Note Date: 04-02-21    Interval History:  Patient seen and examined at bedside this morning  No acute overnight events. Afebrile  on NC 1-2L, sleeping comfortably in chair  Notes reviewed    Antibiotics:      Medications:  acetaminophen   Tablet .. 650 milliGRAM(s) Oral every 6 hours PRN  apixaban 5 milliGRAM(s) Oral every 12 hours  benzonatate 100 milliGRAM(s) Oral every 8 hours  guaifenesin/dextromethorphan  Syrup 10 milliLiter(s) Oral every 6 hours  metoprolol tartrate 100 milliGRAM(s) Oral two times a day      Review of Systems:  A 10-point review of systems was obtained.     Pertinent positives and negatives--  Constitutional: No fevers. No Chills. No Rigors.   Cardiovascular: No chest pain. No palpitations.  Respiratory: No shortness of breath. No cough.  Gastrointestinal: No nausea or vomiting. No diarrhea or constipation.   Psychiatric: Pleasant. Appropriate affect.    Review of systems otherwise negative except as previously noted.    Allergies: Actifed (Headache)  Sudafed (Headache)    For details regarding the patient's past medical history, social history, family history, and other miscellaneous elements, please refer the initial infectious diseases consultation and/or the admitting history and physical examination for this admission.    Objective:  Vitals:   T(C): 36.6 (04-02-21 @ 11:18), Max: 36.9 (04-01-21 @ 21:17)  HR: 60 (04-02-21 @ 11:18) (51 - 90)  BP: 113/74 (04-02-21 @ 11:18) (113/74 - 141/83)  RR: 18 (04-02-21 @ 11:18) (18 - 18)  SpO2: 95% (04-02-21 @ 11:18) (93% - 99%)    Physical Examination:  General: no acute distress  HEENT: NC/AT, EOMI, anicteric, no oral lesions  Neck: supple, no palpable LAD  Cardio: S1, S2 heard, RRR, no murmurs  Resp: decreased b/l breath sounds  Abd: soft, NT, ND, + bowel sounds  Neuro: AAOx3, no obvious focal deficits  Ext: no edema or cyanosis  Skin: warm, dry, no visible rash      Laboratory Studies:  CBC:   CMP:           Microbiology: reviewed      Radiology: reviewed

## 2021-04-02 NOTE — PROGRESS NOTE ADULT - ASSESSMENT
The patient is a 65y Male, hx of morbid obesity, HTN on lisinopril, complaining of shortness of breath. covid + dx 5d prior. no home O2 requirement, former smoker. has dyspnea at rest and on exertion. has not taken steroids, no hospitalization for covid. denies CP. [facility] (27 Feb 2021 22:29)    ER vital:  T 98.4, P 178, /85.  Pt satting 85% on RA, placed on nasal cannula, advanced to NRB.  Covid pcr (+), covid serologies (-).   CT angio chest (-) for PE, (+) b/l patchy opacities.  Pt with elevated AST (>5x ULN but <10x) and ALT.   Pt started on remdesivir and dexamethasone.     Acute covid illness:    - Cont remdesivir and dexamethasone.  Monitor daily LFTs and renal function while pt on remdesivir.  LFTs elevated, possibly secondary to viral infection.  Currently ALT < 10x ULN, can cont remdesivir.  LFTs slowly improving.   Pt completed remdesivir and dexa.     - Monitor pulse ox, pt on supplemental O2 - transitioned to 4L nc    - Monitor inflammatory markers q72    DD:  192 <-- 272 <-- 359 <-- 248 <-- 213 <-- 282 <-- 343  ferritin: 666 <-- 692 <-- 1468 <-- 1636  pct:  0.58  crp: 1.28 <-- 3.0 <-- 2.2 <-- 10.3 <-- 10.9    - Pt now on apixiban for dvt ppx.  CT angio chest (-) for PE.   LE dopplers neg DVT    *  Repeat cxr 3/8 with continued b/l patchy opacities with slight worsening.  No indication for abx at this time.  Pt on VM mask.  Repeat covid swab sent 3/16 positive.      * Oxygenation improving, now on 3L nc.  Repeat cxr 3/22 with b/l patchy opacities c/w covid pna.   Cont present managment.  Wean O2 as tolerated.     4/1 Continues to remain 1-2L on NC. Afebrile, no leukocytosis. Wean O2 as tolerated. Appreciate pulm recs. D/c planning per primary team    4/2 Continues to remain on 2L NC. Afebrile, no leukocytosis. Wean O2 as tolerated. D/c planning per primary team.     I am covering Dr. Wandy Luke through 4/4.  Infectious Diseases will continue to follow. Please call with any questions.   Lynn Chambers M.D.  [facility], Division of Infectious Diseases [phone]  For over the weekend and after hours, please call [phone]

## 2021-04-02 NOTE — PROGRESS NOTE ADULT - SUBJECTIVE AND OBJECTIVE BOX
Subjective: Patient seen and examined. No new events except as noted.   exertional hypoxia   awaiting home oxygen set up     REVIEW OF SYSTEMS:    CONSTITUTIONAL:+ weakness, fevers or chills  EYES/ENT: No visual changes;  No vertigo or throat pain   NECK: No pain or stiffness  RESPIRATORY: No cough, wheezing, hemoptysis; No shortness of breath  CARDIOVASCULAR: No chest pain or palpitations  GASTROINTESTINAL: No abdominal or epigastric pain. No nausea, vomiting, or hematemesis; No diarrhea or constipation. No melena or hematochezia.  GENITOURINARY: No dysuria, frequency or hematuria  NEUROLOGICAL: No numbness or weakness  SKIN: No itching, burning, rashes, or lesions   All other review of systems is negative unless indicated above.    MEDICATIONS:  MEDICATIONS  (STANDING):  apixaban 5 milliGRAM(s) Oral every 12 hours  benzonatate 100 milliGRAM(s) Oral every 8 hours  guaifenesin/dextromethorphan  Syrup 10 milliLiter(s) Oral every 6 hours  metoprolol tartrate 100 milliGRAM(s) Oral two times a day      PHYSICAL EXAM:  T(C): 36.7 (04-02-21 @ 04:20), Max: 36.9 (04-01-21 @ 21:17)  HR: 60 (04-02-21 @ 08:37) (51 - 90)  BP: 131/78 (04-02-21 @ 04:20) (110/73 - 141/83)  RR: 18 (04-02-21 @ 08:40) (18 - 18)  SpO2: 95% (04-02-21 @ 08:40) (93% - 99%)  Wt(kg): --  I&O's Summary    01 Apr 2021 07:01  -  02 Apr 2021 07:00  --------------------------------------------------------  IN: 200 mL / OUT: 1000 mL / NET: -800 mL          Appearance: Normal	  HEENT:   Normal oral mucosa, PERRL, EOMI	  Lymphatic: No lymphadenopathy , no edema  Cardiovascular: Normal S1 S2, No JVD, No murmurs , Peripheral pulses palpable 2+ bilaterally  Respiratory: Lungs clear to auscultation, normal effort 	  Gastrointestinal:  Soft, Non-tender, + BS	  Skin: No rashes, No ecchymoses, No cyanosis, warm to touch  Musculoskeletal: Normal range of motion, normal strength  Psychiatry:  Mood & affect appropriate  Ext: No edema      LABS:    CARDIAC MARKERS:                  proBNP:   Lipid Profile:   HgA1c:   TSH:             TELEMETRY: SR 	    ECG:  	  RADIOLOGY:   DIAGNOSTIC TESTING:  [ ] Echocardiogram:  [ ]  Catheterization:  [ ] Stress Test:    OTHER:

## 2021-04-02 NOTE — PROGRESS NOTE ADULT - SUBJECTIVE AND OBJECTIVE BOX
Follow-up Pulm Progress Note  Francisco Ramon MD  [phone]    Stable respiratory status  Still desaturates with ambulation: general on 1-2 liters nasal cannula    Vital Signs Last 24 Hrs  T(C): 36.6 (02 Apr 2021 11:18), Max: 36.9 (01 Apr 2021 21:17)  T(F): 97.8 (02 Apr 2021 11:18), Max: 98.5 (01 Apr 2021 21:17)  HR: 60 (02 Apr 2021 11:18) (51 - 90)  BP: 113/74 (02 Apr 2021 11:18) (113/74 - 141/83)  BP(mean): --  RR: 18 (02 Apr 2021 11:18) (18 - 18)  SpO2: 95% (02 Apr 2021 11:18) (93% - 99%)      Physical Examination:  PULM: No wheeze  CVS: Regular rate and rhythm, no murmurs, rubs, or gallops  ABD: Soft, non-tender  EXT:  No clubbing, cyanosis, or edema    RADIOLOGY REVIEWED  CXR:    CT chest:    PROCEDURE DATE:  02/27/2021        INTERPRETATION:  CLINICAL INFORMATION: Hypoxia, Covid 19 infection, assess for pulmonary embolism    COMPARISON: Chest radiograph from same day    PROCEDURE:  CT Angiography of the Chest.  90 ml of Omnipaque 350 was injected intravenously. 10 ml were discarded.  Sagittal and coronal reformats were performed as well as 3D (MIP) reconstructions.    FINDINGS:    LUNGS AND AIRWAYS: Patent central airways.  Bilateral patchy groundglass and consolidative opacities with peripheral and lower lobe predominance.  PLEURA: No pleural effusion.  MEDIASTINUM AND KASIE: No lymphadenopathy.  VESSELS: No central, main, lobar, or segmental pulmonary embolism. Evaluation of the subsegmental pulmonary arteries is limited/nondiagnostic secondary to respiratory motion.  HEART: Heart size is normal. No pericardial effusion.  CHEST WALL AND LOWER NECK: Prominent right upper paratracheal lymph node may be reactive  VISUALIZED UPPER ABDOMEN: Small hiatal hernia.  BONES: Spinal degenerative changes.    IMPRESSION:  No central, main, lobar, or segmental pulmonary embolism. Evaluation of the subsegmental pulmonary arteries is limited/nondiagnostic secondary to respiratory motion.    Bilateral patchy groundglass and consolidative opacities with peripheral and lower lobe predominance, compatible with patient's known Covid 19 infection. Follow-up to resolution recommended.    TTE:

## 2021-04-02 NOTE — PROGRESS NOTE ADULT - ASSESSMENT
ACute hypoxemic respiratory failure  Obesity  Multilobar viral pneumonia due to COVID 19  Atrial Fibrillation: now back in in sinus with sinus alis, on Eliquis. will d/w cardiology dropping the toprol dose  Refractory hypoxemia: multifactorial - residual COVID, obesity( lost 18 lbs while inptn for the past month) with basilar atelectasis  CTA 2/27 negative PE and LE dopplers negative 3/3  PLAN:  DC planning: will need home O2 for ambulation  Outpatient sleep evaluation to test for sleep apnea with Dr Meliton Purcell  outptn cardiology f/u w dr Moctezuma  outptn pcp with dr mims or his present PCP

## 2021-04-03 LAB
ALBUMIN SERPL ELPH-MCNC: 2.8 G/DL — LOW (ref 3.3–5)
ALP SERPL-CCNC: 50 U/L — SIGNIFICANT CHANGE UP (ref 40–120)
ALT FLD-CCNC: 12 U/L — SIGNIFICANT CHANGE UP (ref 10–45)
ANION GAP SERPL CALC-SCNC: 10 MMOL/L — SIGNIFICANT CHANGE UP (ref 5–17)
AST SERPL-CCNC: 13 U/L — SIGNIFICANT CHANGE UP (ref 10–40)
BILIRUB SERPL-MCNC: 0.3 MG/DL — SIGNIFICANT CHANGE UP (ref 0.2–1.2)
BUN SERPL-MCNC: 11 MG/DL — SIGNIFICANT CHANGE UP (ref 7–23)
CALCIUM SERPL-MCNC: 8.4 MG/DL — SIGNIFICANT CHANGE UP (ref 8.4–10.5)
CHLORIDE SERPL-SCNC: 104 MMOL/L — SIGNIFICANT CHANGE UP (ref 96–108)
CO2 SERPL-SCNC: 29 MMOL/L — SIGNIFICANT CHANGE UP (ref 22–31)
CREAT SERPL-MCNC: 1.08 MG/DL — SIGNIFICANT CHANGE UP (ref 0.5–1.3)
GLUCOSE SERPL-MCNC: 71 MG/DL — SIGNIFICANT CHANGE UP (ref 70–99)
HCT VFR BLD CALC: 33.2 % — LOW (ref 39–50)
HGB BLD-MCNC: 10.5 G/DL — LOW (ref 13–17)
MCHC RBC-ENTMCNC: 29.3 PG — SIGNIFICANT CHANGE UP (ref 27–34)
MCHC RBC-ENTMCNC: 31.6 GM/DL — LOW (ref 32–36)
MCV RBC AUTO: 92.7 FL — SIGNIFICANT CHANGE UP (ref 80–100)
NRBC # BLD: 0 /100 WBCS — SIGNIFICANT CHANGE UP (ref 0–0)
PLATELET # BLD AUTO: 371 K/UL — SIGNIFICANT CHANGE UP (ref 150–400)
POTASSIUM SERPL-MCNC: 3.4 MMOL/L — LOW (ref 3.5–5.3)
POTASSIUM SERPL-SCNC: 3.4 MMOL/L — LOW (ref 3.5–5.3)
PROT SERPL-MCNC: 5.7 G/DL — LOW (ref 6–8.3)
RBC # BLD: 3.58 M/UL — LOW (ref 4.2–5.8)
RBC # FLD: 13.9 % — SIGNIFICANT CHANGE UP (ref 10.3–14.5)
SODIUM SERPL-SCNC: 143 MMOL/L — SIGNIFICANT CHANGE UP (ref 135–145)
WBC # BLD: 5.97 K/UL — SIGNIFICANT CHANGE UP (ref 3.8–10.5)
WBC # FLD AUTO: 5.97 K/UL — SIGNIFICANT CHANGE UP (ref 3.8–10.5)

## 2021-04-03 RX ORDER — POTASSIUM CHLORIDE 20 MEQ
20 PACKET (EA) ORAL
Refills: 0 | Status: COMPLETED | OUTPATIENT
Start: 2021-04-03 | End: 2021-04-03

## 2021-04-03 RX ADMIN — Medication 20 MILLIEQUIVALENT(S): at 22:09

## 2021-04-03 RX ADMIN — Medication 100 MILLIGRAM(S): at 22:09

## 2021-04-03 RX ADMIN — APIXABAN 5 MILLIGRAM(S): 2.5 TABLET, FILM COATED ORAL at 05:07

## 2021-04-03 RX ADMIN — Medication 10 MILLILITER(S): at 17:11

## 2021-04-03 RX ADMIN — Medication 100 MILLIGRAM(S): at 17:11

## 2021-04-03 RX ADMIN — Medication 100 MILLIGRAM(S): at 05:07

## 2021-04-03 RX ADMIN — Medication 10 MILLILITER(S): at 11:14

## 2021-04-03 RX ADMIN — Medication 20 MILLIEQUIVALENT(S): at 23:49

## 2021-04-03 RX ADMIN — Medication 100 MILLIGRAM(S): at 13:40

## 2021-04-03 RX ADMIN — Medication 10 MILLILITER(S): at 23:49

## 2021-04-03 RX ADMIN — Medication 20 MILLIEQUIVALENT(S): at 20:31

## 2021-04-03 RX ADMIN — APIXABAN 5 MILLIGRAM(S): 2.5 TABLET, FILM COATED ORAL at 17:11

## 2021-04-03 RX ADMIN — Medication 10 MILLILITER(S): at 05:07

## 2021-04-03 NOTE — PROGRESS NOTE ADULT - SUBJECTIVE AND OBJECTIVE BOX
Patient is a 65y old  Male who presents with a chief complaint of acute hypoxemic respiratory failure (02 Apr 2021 22:07)      SUBJECTIVE / OVERNIGHT EVENTS:    MEDICATIONS  (STANDING):  apixaban 5 milliGRAM(s) Oral every 12 hours  benzonatate 100 milliGRAM(s) Oral every 8 hours  guaifenesin/dextromethorphan  Syrup 10 milliLiter(s) Oral every 6 hours  metoprolol tartrate 100 milliGRAM(s) Oral two times a day  potassium chloride    Tablet ER 20 milliEquivalent(s) Oral every 2 hours    MEDICATIONS  (PRN):  acetaminophen   Tablet .. 650 milliGRAM(s) Oral every 6 hours PRN Mild Pain (1 - 3)      Vital Signs Last 24 Hrs  T(F): 98.5 (04-03-21 @ 21:13), Max: 98.5 (04-03-21 @ 21:13)  HR: 62 (04-03-21 @ 21:13) (60 - 82)  BP: 134/79 (04-03-21 @ 21:13) (134/79 - 143/80)  RR: 18 (04-03-21 @ 21:13) (18 - 18)  SpO2: 96% (04-03-21 @ 21:13) (95% - 98%)  Telemetry:   CAPILLARY BLOOD GLUCOSE        I&O's Summary    02 Apr 2021 07:01  -  03 Apr 2021 07:00  --------------------------------------------------------  IN: 240 mL / OUT: 0 mL / NET: 240 mL        PHYSICAL EXAM:  GENERAL: NAD, well-developed  HEAD:  Atraumatic, Normocephalic  EYES: EOMI, PERRLA, conjunctiva and sclera clear  NECK: Supple, No JVD  CHEST/LUNG: Clear to auscultation bilaterally; No wheeze  HEART: Regular rate and rhythm; No murmurs, rubs, or gallops  ABDOMEN: Soft, Nontender, Nondistended; Bowel sounds present  EXTREMITIES:  2+ Peripheral Pulses, No clubbing, cyanosis, or edema  PSYCH: AAOx3  NEUROLOGY: non-focal  SKIN: No rashes or lesions    LABS:                        10.5   5.97  )-----------( 371      ( 03 Apr 2021 06:36 )             33.2     04-03    143  |  104  |  11  ----------------------------<  71  3.4<L>   |  29  |  1.08    Ca    8.4      03 Apr 2021 06:35    TPro  5.7<L>  /  Alb  2.8<L>  /  TBili  0.3  /  DBili  x   /  AST  13  /  ALT  12  /  AlkPhos  50  04-03              RADIOLOGY & ADDITIONAL TESTS:    Imaging Personally Reviewed:    Consultant(s) Notes Reviewed:      Care Discussed with Consultants/Other Providers:   Patient is a 65y old  Male who presents with a chief complaint of acute hypoxemic respiratory failure (02 Apr 2021 22:07)      SUBJECTIVE / OVERNIGHT EVENTS: awaiting home O2 authorization    MEDICATIONS  (STANDING):  apixaban 5 milliGRAM(s) Oral every 12 hours  benzonatate 100 milliGRAM(s) Oral every 8 hours  guaifenesin/dextromethorphan  Syrup 10 milliLiter(s) Oral every 6 hours  metoprolol tartrate 100 milliGRAM(s) Oral two times a day  potassium chloride    Tablet ER 20 milliEquivalent(s) Oral every 2 hours    MEDICATIONS  (PRN):  acetaminophen   Tablet .. 650 milliGRAM(s) Oral every 6 hours PRN Mild Pain (1 - 3)      Vital Signs Last 24 Hrs  T(F): 98.5 (04-03-21 @ 21:13), Max: 98.5 (04-03-21 @ 21:13)  HR: 62 (04-03-21 @ 21:13) (60 - 82)  BP: 134/79 (04-03-21 @ 21:13) (134/79 - 143/80)  RR: 18 (04-03-21 @ 21:13) (18 - 18)  SpO2: 96% (04-03-21 @ 21:13) (95% - 98%)  Telemetry:   CAPILLARY BLOOD GLUCOSE        I&O's Summary    02 Apr 2021 07:01  -  03 Apr 2021 07:00  --------------------------------------------------------  IN: 240 mL / OUT: 0 mL / NET: 240 mL        PHYSICAL EXAM:  GENERAL: NAD, well-developed  HEAD:  Atraumatic, Normocephalic  EYES: EOMI, PERRLA, conjunctiva and sclera clear  NECK: Supple, No JVD  CHEST/LUNG: Clear to auscultation bilaterally; No wheeze  HEART: Regular rate and rhythm; No murmurs, rubs, or gallops  ABDOMEN: Soft, Nontender, Nondistended; Bowel sounds present  EXTREMITIES:  2+ Peripheral Pulses, No clubbing, cyanosis, or edema  PSYCH: AAOx3  NEUROLOGY: non-focal  SKIN: No rashes or lesions    LABS:                        10.5   5.97  )-----------( 371      ( 03 Apr 2021 06:36 )             33.2     04-03    143  |  104  |  11  ----------------------------<  71  3.4<L>   |  29  |  1.08    Ca    8.4      03 Apr 2021 06:35    TPro  5.7<L>  /  Alb  2.8<L>  /  TBili  0.3  /  DBili  x   /  AST  13  /  ALT  12  /  AlkPhos  50  04-03              RADIOLOGY & ADDITIONAL TESTS:    Imaging Personally Reviewed:    Consultant(s) Notes Reviewed:      Care Discussed with Consultants/Other Providers:

## 2021-04-04 RX ADMIN — Medication 10 MILLILITER(S): at 11:34

## 2021-04-04 RX ADMIN — APIXABAN 5 MILLIGRAM(S): 2.5 TABLET, FILM COATED ORAL at 17:19

## 2021-04-04 RX ADMIN — APIXABAN 5 MILLIGRAM(S): 2.5 TABLET, FILM COATED ORAL at 06:01

## 2021-04-04 RX ADMIN — Medication 10 MILLILITER(S): at 17:19

## 2021-04-04 RX ADMIN — Medication 100 MILLIGRAM(S): at 06:01

## 2021-04-04 RX ADMIN — Medication 100 MILLIGRAM(S): at 13:35

## 2021-04-04 RX ADMIN — Medication 100 MILLIGRAM(S): at 21:11

## 2021-04-04 RX ADMIN — Medication 100 MILLIGRAM(S): at 17:19

## 2021-04-04 RX ADMIN — Medication 10 MILLILITER(S): at 06:01

## 2021-04-04 RX ADMIN — Medication 10 MILLILITER(S): at 23:14

## 2021-04-04 NOTE — PROGRESS NOTE ADULT - TIME BILLING
Advanced care planning was discussed with patient and family.  Advanced care planning forms were reviewed and discussed as appropriate.  Differential diagnosis and plan of care discussed with patient after the evaluation.   Pain assessed and judicious use of narcotics when appropriate was discussed.  Importance of Fall prevention discussed.  Counseling on Smoking and Alcohol cessation was offered when appropriate.  Counseling on Diet, exercise, and medication compliance was done.

## 2021-04-04 NOTE — PROGRESS NOTE ADULT - NUTRITIONAL ASSESSMENT
This patient has been assessed with a concern for Malnutrition and has been determined to have a diagnosis/diagnoses of Morbid obesity (BMI > 40).    This patient is being managed with:   Diet DASH/TLC-  Sodium & Cholesterol Restricted  Entered: Feb 27 2021 10:26PM    

## 2021-04-04 NOTE — PROGRESS NOTE ADULT - SUBJECTIVE AND OBJECTIVE BOX
Patient is a 65y old  Male who presents with a chief complaint of acute hypoxemic respiratory failure (03 Apr 2021 22:25)      SUBJECTIVE / OVERNIGHT EVENTS: awaiting home O2 authorization    MEDICATIONS  (STANDING):  apixaban 5 milliGRAM(s) Oral every 12 hours  benzonatate 100 milliGRAM(s) Oral every 8 hours  guaifenesin/dextromethorphan  Syrup 10 milliLiter(s) Oral every 6 hours  metoprolol tartrate 100 milliGRAM(s) Oral two times a day    MEDICATIONS  (PRN):  acetaminophen   Tablet .. 650 milliGRAM(s) Oral every 6 hours PRN Mild Pain (1 - 3)      Vital Signs Last 24 Hrs  T(F): 98.5 (04-04-21 @ 14:05), Max: 98.5 (04-03-21 @ 21:13)  HR: 62 (04-04-21 @ 15:14) (58 - 66)  BP: 131/83 (04-04-21 @ 14:05) (128/87 - 134/79)  RR: 18 (04-04-21 @ 14:05) (18 - 18)  SpO2: 99% (04-04-21 @ 15:14) (93% - 99%)  Telemetry:   CAPILLARY BLOOD GLUCOSE        I&O's Summary    03 Apr 2021 07:01  -  04 Apr 2021 07:00  --------------------------------------------------------  IN: 120 mL / OUT: 400 mL / NET: -280 mL        PHYSICAL EXAM:  GENERAL: NAD, well-developed  HEAD:  Atraumatic, Normocephalic  EYES: EOMI, PERRLA, conjunctiva and sclera clear  NECK: Supple, No JVD  CHEST/LUNG: Clear to auscultation bilaterally; No wheeze  HEART: Regular rate and rhythm; No murmurs, rubs, or gallops  ABDOMEN: Soft, Nontender, Nondistended; Bowel sounds present  EXTREMITIES:  2+ Peripheral Pulses, No clubbing, cyanosis, or edema  PSYCH: AAOx3  NEUROLOGY: non-focal  SKIN: No rashes or lesions    LABS:                        10.5   5.97  )-----------( 371      ( 03 Apr 2021 06:36 )             33.2     04-03    143  |  104  |  11  ----------------------------<  71  3.4<L>   |  29  |  1.08    Ca    8.4      03 Apr 2021 06:35    TPro  5.7<L>  /  Alb  2.8<L>  /  TBili  0.3  /  DBili  x   /  AST  13  /  ALT  12  /  AlkPhos  50  04-03              RADIOLOGY & ADDITIONAL TESTS:    Imaging Personally Reviewed:    Consultant(s) Notes Reviewed:      Care Discussed with Consultants/Other Providers:

## 2021-04-04 NOTE — PROGRESS NOTE ADULT - SUBJECTIVE AND OBJECTIVE BOX
Subjective: Patient seen and examined. No new events except as noted.   moved out of COVID unit     REVIEW OF SYSTEMS:    CONSTITUTIONAL: + weakness, fevers or chills  EYES/ENT: No visual changes;  No vertigo or throat pain   NECK: No pain or stiffness  RESPIRATORY: No cough, wheezing, hemoptysis; No shortness of breath  CARDIOVASCULAR: No chest pain or palpitations  GASTROINTESTINAL: No abdominal or epigastric pain. No nausea, vomiting, or hematemesis; No diarrhea or constipation. No melena or hematochezia.  GENITOURINARY: No dysuria, frequency or hematuria  NEUROLOGICAL: No numbness or weakness  SKIN: No itching, burning, rashes, or lesions   All other review of systems is negative unless indicated above.    MEDICATIONS:  MEDICATIONS  (STANDING):  apixaban 5 milliGRAM(s) Oral every 12 hours  benzonatate 100 milliGRAM(s) Oral every 8 hours  guaifenesin/dextromethorphan  Syrup 10 milliLiter(s) Oral every 6 hours  metoprolol tartrate 100 milliGRAM(s) Oral two times a day      PHYSICAL EXAM:  T(C): 36.7 (04-04-21 @ 20:18), Max: 36.9 (04-04-21 @ 14:05)  HR: 61 (04-04-21 @ 20:18) (58 - 66)  BP: 118/73 (04-04-21 @ 20:18) (118/73 - 131/83)  RR: 18 (04-04-21 @ 20:18) (18 - 18)  SpO2: 98% (04-04-21 @ 20:18) (93% - 99%)  Wt(kg): --  I&O's Summary    03 Apr 2021 07:01  -  04 Apr 2021 07:00  --------------------------------------------------------  IN: 120 mL / OUT: 400 mL / NET: -280 mL    04 Apr 2021 07:01  -  04 Apr 2021 21:46  --------------------------------------------------------  IN: 240 mL / OUT: 0 mL / NET: 240 mL          Appearance: Normal	  HEENT:   Normal oral mucosa, PERRL, EOMI	  Lymphatic: No lymphadenopathy , no edema  Cardiovascular: Normal S1 S2, No JVD, No murmurs , Peripheral pulses palpable 2+ bilaterally  Respiratory: Lungs clear to auscultation, normal effort 	  Gastrointestinal:  Soft, Non-tender, + BS	  Skin: No rashes, No ecchymoses, No cyanosis, warm to touch  Musculoskeletal: Normal range of motion, normal strength  Psychiatry:  Mood & affect appropriate  Ext: No edema      LABS:    CARDIAC MARKERS:                                10.5   5.97  )-----------( 371      ( 03 Apr 2021 06:36 )             33.2     04-03    143  |  104  |  11  ----------------------------<  71  3.4<L>   |  29  |  1.08    Ca    8.4      03 Apr 2021 06:35    TPro  5.7<L>  /  Alb  2.8<L>  /  TBili  0.3  /  DBili  x   /  AST  13  /  ALT  12  /  AlkPhos  50  04-03    proBNP:   Lipid Profile:   HgA1c:   TSH:             TELEMETRY: 	    ECG:  	  RADIOLOGY:   DIAGNOSTIC TESTING:  [ ] Echocardiogram:  [ ]  Catheterization:  [ ] Stress Test:    OTHER:

## 2021-04-05 ENCOUNTER — TRANSCRIPTION ENCOUNTER (OUTPATIENT)
Age: 66
End: 2021-04-05

## 2021-04-05 PROCEDURE — 96374 THER/PROPH/DIAG INJ IV PUSH: CPT

## 2021-04-05 PROCEDURE — 97161 PT EVAL LOW COMPLEX 20 MIN: CPT

## 2021-04-05 PROCEDURE — 94640 AIRWAY INHALATION TREATMENT: CPT

## 2021-04-05 PROCEDURE — 82947 ASSAY GLUCOSE BLOOD QUANT: CPT

## 2021-04-05 PROCEDURE — 84100 ASSAY OF PHOSPHORUS: CPT

## 2021-04-05 PROCEDURE — 85014 HEMATOCRIT: CPT

## 2021-04-05 PROCEDURE — 80162 ASSAY OF DIGOXIN TOTAL: CPT

## 2021-04-05 PROCEDURE — 71045 X-RAY EXAM CHEST 1 VIEW: CPT

## 2021-04-05 PROCEDURE — 71275 CT ANGIOGRAPHY CHEST: CPT

## 2021-04-05 PROCEDURE — 82803 BLOOD GASES ANY COMBINATION: CPT

## 2021-04-05 PROCEDURE — 80053 COMPREHEN METABOLIC PANEL: CPT

## 2021-04-05 PROCEDURE — 97116 GAIT TRAINING THERAPY: CPT

## 2021-04-05 PROCEDURE — 80076 HEPATIC FUNCTION PANEL: CPT

## 2021-04-05 PROCEDURE — 83605 ASSAY OF LACTIC ACID: CPT

## 2021-04-05 PROCEDURE — 96375 TX/PRO/DX INJ NEW DRUG ADDON: CPT

## 2021-04-05 PROCEDURE — 97530 THERAPEUTIC ACTIVITIES: CPT

## 2021-04-05 PROCEDURE — 87040 BLOOD CULTURE FOR BACTERIA: CPT

## 2021-04-05 PROCEDURE — 85018 HEMOGLOBIN: CPT

## 2021-04-05 PROCEDURE — 36600 WITHDRAWAL OF ARTERIAL BLOOD: CPT

## 2021-04-05 PROCEDURE — 83735 ASSAY OF MAGNESIUM: CPT

## 2021-04-05 PROCEDURE — 86140 C-REACTIVE PROTEIN: CPT

## 2021-04-05 PROCEDURE — U0005: CPT

## 2021-04-05 PROCEDURE — 80048 BASIC METABOLIC PNL TOTAL CA: CPT

## 2021-04-05 PROCEDURE — 82435 ASSAY OF BLOOD CHLORIDE: CPT

## 2021-04-05 PROCEDURE — 85027 COMPLETE CBC AUTOMATED: CPT

## 2021-04-05 PROCEDURE — 82330 ASSAY OF CALCIUM: CPT

## 2021-04-05 PROCEDURE — 94660 CPAP INITIATION&MGMT: CPT

## 2021-04-05 PROCEDURE — 83615 LACTATE (LD) (LDH) ENZYME: CPT

## 2021-04-05 PROCEDURE — 99285 EMERGENCY DEPT VISIT HI MDM: CPT

## 2021-04-05 PROCEDURE — 85379 FIBRIN DEGRADATION QUANT: CPT

## 2021-04-05 PROCEDURE — 85025 COMPLETE CBC W/AUTO DIFF WBC: CPT

## 2021-04-05 PROCEDURE — 82728 ASSAY OF FERRITIN: CPT

## 2021-04-05 PROCEDURE — 84132 ASSAY OF SERUM POTASSIUM: CPT

## 2021-04-05 PROCEDURE — 84295 ASSAY OF SERUM SODIUM: CPT

## 2021-04-05 PROCEDURE — U0003: CPT

## 2021-04-05 PROCEDURE — 93970 EXTREMITY STUDY: CPT

## 2021-04-05 PROCEDURE — 97110 THERAPEUTIC EXERCISES: CPT

## 2021-04-05 PROCEDURE — 82565 ASSAY OF CREATININE: CPT

## 2021-04-05 PROCEDURE — 85610 PROTHROMBIN TIME: CPT

## 2021-04-05 PROCEDURE — 93005 ELECTROCARDIOGRAM TRACING: CPT

## 2021-04-05 PROCEDURE — 84145 PROCALCITONIN (PCT): CPT

## 2021-04-05 PROCEDURE — 84484 ASSAY OF TROPONIN QUANT: CPT

## 2021-04-05 PROCEDURE — 71250 CT THORAX DX C-: CPT

## 2021-04-05 PROCEDURE — C8929: CPT

## 2021-04-05 RX ADMIN — Medication 100 MILLIGRAM(S): at 18:41

## 2021-04-05 RX ADMIN — Medication 10 MILLILITER(S): at 18:42

## 2021-04-05 RX ADMIN — Medication 10 MILLILITER(S): at 13:01

## 2021-04-05 RX ADMIN — Medication 100 MILLIGRAM(S): at 05:03

## 2021-04-05 RX ADMIN — APIXABAN 5 MILLIGRAM(S): 2.5 TABLET, FILM COATED ORAL at 05:03

## 2021-04-05 RX ADMIN — APIXABAN 5 MILLIGRAM(S): 2.5 TABLET, FILM COATED ORAL at 18:42

## 2021-04-05 RX ADMIN — Medication 10 MILLILITER(S): at 05:03

## 2021-04-05 RX ADMIN — Medication 100 MILLIGRAM(S): at 13:01

## 2021-04-05 NOTE — CHART NOTE - NSCHARTNOTESELECT_GEN_ALL_CORE
A fib/Event Note
A. fib with RVR HR 140s/Event Note
A. fib with RVR/Event Note
Event Note
J&J Vaccine/Event Note
Nutrition Services

## 2021-04-05 NOTE — PROGRESS NOTE ADULT - PROVIDER SPECIALTY LIST ADULT
Infectious Disease
Pulmonology
Cardiology
Cardiology
Electrophysiology
Infectious Disease
Infectious Disease
Pulmonology
Cardiology
Electrophysiology
Electrophysiology
Infectious Disease
Internal Medicine
Pulmonology
Cardiology
Cardiology
Infectious Disease
Internal Medicine
Pulmonology
Cardiology
Cardiology
Infectious Disease
Infectious Disease
Internal Medicine
Pulmonology
Cardiology
Internal Medicine
Internal Medicine
Cardiology
Cardiology
Internal Medicine
Cardiology
Internal Medicine
Internal Medicine
Cardiology
Internal Medicine
Cardiology
Internal Medicine
Internal Medicine
Cardiology
Cardiology
Internal Medicine

## 2021-04-05 NOTE — CHART NOTE - NSCHARTNOTEFT_GEN_A_CORE
Discussed with patient regarding [employer] COVID vaccine.   Patient currently refusing vaccine because he simply "does not want it."    Isha Garcia PA-C  #[id number]

## 2021-04-05 NOTE — PROGRESS NOTE ADULT - NSICDXPILOT_GEN_ALL_CORE
[place]

## 2021-04-05 NOTE — PROGRESS NOTE ADULT - REASON FOR ADMISSION
acute hypoxemic respiratory failure

## 2021-04-05 NOTE — PROGRESS NOTE ADULT - SUBJECTIVE AND OBJECTIVE BOX
Follow-up Pulm Progress Note    Awaiting authorization for home o2.     Medications:  MEDICATIONS  (STANDING):  apixaban 5 milliGRAM(s) Oral every 12 hours  benzonatate 100 milliGRAM(s) Oral every 8 hours  guaifenesin/dextromethorphan  Syrup 10 milliLiter(s) Oral every 6 hours  metoprolol tartrate 100 milliGRAM(s) Oral two times a day    MEDICATIONS  (PRN):  acetaminophen   Tablet .. 650 milliGRAM(s) Oral every 6 hours PRN Mild Pain (1 - 3)    Vital Signs Last 24 Hrs  T(C): 36.7 (05 Apr 2021 08:25), Max: 37 (05 Apr 2021 05:01)  T(F): 98.1 (05 Apr 2021 08:25), Max: 98.6 (05 Apr 2021 05:01)  HR: 65 (05 Apr 2021 09:49) (35 - 66)  BP: 129/72 (05 Apr 2021 08:25) (118/73 - 158/80)  BP(mean): --  RR: 18 (05 Apr 2021 08:25) (18 - 18)  SpO2: 97% (05 Apr 2021 09:49) (93% - 99%)    04-04 @ 07:01  -  04-05 @ 07:00  --------------------------------------------------------  IN: 240 mL / OUT: 200 mL / NET: 40 mL    LABS:    CAPILLARY BLOOD GLUCOSE    CULTURES: (if applicable)    Physical Examination:  PULM: Clear to auscultation bilaterally, no significant sputum production  CVS: S1, S2 heard    RADIOLOGY REVIEWED  CXR:     CT chest:    TTE:

## 2021-04-05 NOTE — CHART NOTE - NSCHARTNOTEFT_GEN_A_CORE
Request from Dr. Langley to facilitate patient discharge. Medication reconciliation reviewed, revised, and resolved with Dr. Langley who had medically cleared patient for discharge with follow-up as advised. Please refer to discharge note for detailed hospital course. Patient is currently stable for discharge to home with home care at this time.    Contact #[phone]  Isha BAUMANN  Dept of Medicine

## 2021-04-05 NOTE — DISCHARGE NOTE NURSING/CASE MANAGEMENT/SOCIAL WORK - PATIENT PORTAL LINK FT
You can access the FollowMyHealth Patient Portal offered by [facility] by registering at the following website: [email or web address]. By joining [employer]’s FollowMyHealth portal, you will also be able to view your health information using other applications (apps) compatible with our system.

## 2021-04-05 NOTE — PROGRESS NOTE ADULT - ASSESSMENT
Acute hypoxemic respiratory failure  Obesity  Multilobar viral pneumonia due to COVID 19  Atrial Fibrillation: now back in NSR - on Apixaban  Refractory hypoxemia: multifactorial - residual COVID, obesity with basilar atelectasis  CTA 2/27 negative PE and LE dopplers negative 3/3    REC    DC planning: will need home O2 for ambulation  Outpatient sleep evaluation to test for sleep apnea:  [facility], he is welcome to see me

## 2021-04-06 ENCOUNTER — TRANSCRIPTION ENCOUNTER (OUTPATIENT)
Age: 66
End: 2021-04-06

## 2021-04-06 VITALS
OXYGEN SATURATION: 98 % | RESPIRATION RATE: 18 BRPM | HEART RATE: 71 BPM | DIASTOLIC BLOOD PRESSURE: 65 MMHG | TEMPERATURE: 98 F | SYSTOLIC BLOOD PRESSURE: 124 MMHG

## 2021-04-21 ENCOUNTER — TRANSCRIPTION ENCOUNTER (OUTPATIENT)
Age: 66
End: 2021-04-21

## 2021-09-18 NOTE — DISCHARGE NOTE PROVIDER - NSDCHHNEEDSERVICE_GEN_ALL_CORE
Rehabilitation services Labs/Imaging Studies/Medications Medication teaching and assessment/Observation and assessment/Rehabilitation services/Teaching and training

## 2022-07-04 NOTE — ED PROVIDER NOTE - NS ED MD EKG BUNDLE BRANCH BLOCK 1
Avoid weight bearing on the affected foot for now using crutches.   Wear your protective shoe at all times pending imaging results.  Plan to follow up with Orthopedics; please call to schedule this appointment. Orthopedic Central Scheduling phone # [phone]    
Left Deviation

## 2023-01-03 NOTE — PROGRESS NOTE ADULT - SUBJECTIVE AND OBJECTIVE BOX
Prescription sent for NovoLog 70/30 30 units twice a day to the pharmacy Patient is a 65y old  Male who presents with a chief complaint of acute hypoxemic respiratory failure (26 Mar 2021 13:06)      SUBJECTIVE / OVERNIGHT EVENTS:    Events noted.  CONSTITUTIONAL: No fever,  or fatigue  RESPIRATORY: On supp O2  CARDIOVASCULAR: No chest pain, palpitations,   GASTROINTESTINAL: No abdominal or epigastric pain.   NEUROLOGICAL: No headaches,     MEDICATIONS  (STANDING):  apixaban 5 milliGRAM(s) Oral every 12 hours  benzonatate 100 milliGRAM(s) Oral every 8 hours  guaifenesin/dextromethorphan  Syrup 10 milliLiter(s) Oral every 6 hours  metoprolol tartrate 100 milliGRAM(s) Oral two times a day    MEDICATIONS  (PRN):  acetaminophen   Tablet .. 650 milliGRAM(s) Oral every 6 hours PRN Mild Pain (1 - 3)        CAPILLARY BLOOD GLUCOSE        I&O's Summary    25 Mar 2021 07:01  -  26 Mar 2021 07:00  --------------------------------------------------------  IN: 960 mL / OUT: 1000 mL / NET: -40 mL    26 Mar 2021 07:01  -  26 Mar 2021 22:40  --------------------------------------------------------  IN: 500 mL / OUT: 600 mL / NET: -100 mL        T(C): 36.9 (03-26-21 @ 20:51), Max: 36.9 (03-26-21 @ 12:19)  HR: 52 (03-26-21 @ 21:42) (52 - 71)  BP: 147/85 (03-26-21 @ 20:51) (118/73 - 147/85)  RR: 18 (03-26-21 @ 20:51) (18 - 20)  SpO2: 98% (03-26-21 @ 21:42) (90% - 98%)    PHYSICAL EXAM:    NECK: Supple, No JVD  CHEST/LUNG: Clear to auscultation bilaterally; No wheezing.  HEART: Regular rate and rhythm; No murmurs, rubs, or gallops  ABDOMEN: Soft, Nontender, Nondistended; Bowel sounds present  EXTREMITIES:   No edema  NEUROLOGY: AAO X 3      LABS:                        10.7   4.29  )-----------( 287      ( 26 Mar 2021 07:15 )             34.5     03-26    140  |  102  |  11  ----------------------------<  77  4.0   |  27  |  1.02    Ca    8.8      26 Mar 2021 07:13    TPro  6.2  /  Alb  2.9<L>  /  TBili  0.3  /  DBili  x   /  AST  16  /  ALT  20  /  AlkPhos  48  03-25            CAPILLARY BLOOD GLUCOSE            RADIOLOGY & ADDITIONAL TESTS:    Imaging Personally Reviewed:    Consultant(s) Notes Reviewed:      Care Discussed with Consultants/Other Providers:    Rosendo Bhatti MD, CMD, FACP    [address] [place] [address]  Office Tel: [phone]  Cell: [phone]

## 2023-12-06 NOTE — DISCHARGE NOTE PROVIDER - NSDCMRMEDTOKEN_GEN_ALL_CORE_FT
acetaminophen 325 mg oral tablet: 2 tab(s) orally every 6 hours, As needed, Mild Pain (1 - 3)  apixaban 5 mg oral tablet: 1 tab(s) orally every 12 hours  benzonatate 100 mg oral capsule: 1 cap(s) orally every 8 hours  guaifenesin-dextromethorphan 100 mg-10 mg/5 mL oral liquid: 10 milliliter(s) orally every 6 hours  metoprolol tartrate 100 mg oral tablet: 1 tab(s) orally 2 times a day   Pupils equal, round and reactive to light, Extra-ocular movement intact, eyes are clear b/l.

## 2025-07-25 NOTE — PROGRESS NOTE ADULT - SUBJECTIVE AND OBJECTIVE BOX
Health Maintenance       Meningococcal Serogroup B Vaccine (2 of 2 - Bexsero SCDM 2-dose series)  Overdue since 2/29/2024    COVID-19 Vaccine (1 - 2024-25 season)  Never done    Well Child Visit (ages 3 - 21) (Yearly)  Scheduled for 7/29/2026           Following review of the above:  Patient is not proceeding with: Meningococcal and Meningococcal Serogroup B    Note: Refer to final orders and clinician documentation.       Subjective: Patient seen and examined. No new events except as noted.     REVIEW OF SYSTEMS:    CONSTITUTIONAL: + weakness, fevers or chills  EYES/ENT: No visual changes;  No vertigo or throat pain   NECK: No pain or stiffness  RESPIRATORY: No cough, wheezing, hemoptysis; No shortness of breath  CARDIOVASCULAR: No chest pain or palpitations  GASTROINTESTINAL: No abdominal or epigastric pain. No nausea, vomiting, or hematemesis; No diarrhea or constipation. No melena or hematochezia.  GENITOURINARY: No dysuria, frequency or hematuria  NEUROLOGICAL: No numbness or weakness  SKIN: No itching, burning, rashes, or lesions   All other review of systems is negative unless indicated above.    MEDICATIONS:  MEDICATIONS  (STANDING):  apixaban 5 milliGRAM(s) Oral every 12 hours  benzonatate 100 milliGRAM(s) Oral every 8 hours  guaifenesin/dextromethorphan  Syrup 10 milliLiter(s) Oral every 6 hours  metoprolol tartrate 50 milliGRAM(s) Oral every 6 hours      PHYSICAL EXAM:  T(C): 36.8 (03-24-21 @ 04:12), Max: 37 (03-23-21 @ 21:22)  HR: 73 (03-24-21 @ 08:45) (58 - 75)  BP: 134/82 (03-24-21 @ 04:12) (119/80 - 134/82)  RR: 20 (03-24-21 @ 09:12) (18 - 20)  SpO2: 90% (03-24-21 @ 09:12) (90% - 100%)  Wt(kg): --  I&O's Summary    23 Mar 2021 07:01  -  24 Mar 2021 07:00  --------------------------------------------------------  IN: 1140 mL / OUT: 1625 mL / NET: -485 mL          Appearance: NAD  HEENT:   Normal oral mucosa, PERRL, EOMI	  Lymphatic: No lymphadenopathy , no edema  Cardiovascular: Normal S1 S2, No JVD, No murmurs , Peripheral pulses palpable 2+ bilaterally  Respiratory: Lungs clear to auscultation, normal effort 	  Gastrointestinal:  Soft, Non-tender, + BS	  Skin: No rashes, No ecchymoses, No cyanosis, warm to touch  Musculoskeletal: Normal range of motion, normal strength  Psychiatry:  Mood & affect appropriate  Ext: No edema      LABS:    CARDIAC MARKERS:                                11.1   3.65  )-----------( 221      ( 24 Mar 2021 06:34 )             34.5     03-24    143  |  103  |  8   ----------------------------<  82  3.6   |  30  |  0.98    Ca    8.9      24 Mar 2021 06:34  Phos  3.5     03-24  Mg     1.9     03-24    TPro  6.2  /  Alb  3.1<L>  /  TBili  0.2  /  DBili  x   /  AST  19  /  ALT  22  /  AlkPhos  49  03-24    proBNP:   Lipid Profile:   HgA1c:   TSH:             TELEMETRY: 	SR     ECG:  	  RADIOLOGY:   DIAGNOSTIC TESTING:  [ ] Echocardiogram:  [ ]  Catheterization:  [ ] Stress Test:    OTHER: